# Patient Record
Sex: FEMALE | Race: WHITE | NOT HISPANIC OR LATINO | Employment: PART TIME | URBAN - NONMETROPOLITAN AREA
[De-identification: names, ages, dates, MRNs, and addresses within clinical notes are randomized per-mention and may not be internally consistent; named-entity substitution may affect disease eponyms.]

---

## 2019-02-05 ENCOUNTER — OFFICE VISIT CONVERTED (OUTPATIENT)
Dept: FAMILY MEDICINE CLINIC | Age: 54
End: 2019-02-05
Attending: FAMILY MEDICINE

## 2019-02-07 ENCOUNTER — HOSPITAL ENCOUNTER (OUTPATIENT)
Dept: OTHER | Facility: HOSPITAL | Age: 54
Discharge: HOME | End: 2019-02-07
Attending: FAMILY MEDICINE

## 2019-02-07 LAB
ALBUMIN SERPL-MCNC: 4 G/DL (ref 3.5–5)
ALBUMIN/GLOB SERPL: 1.5 {RATIO} (ref 1.4–2.6)
ALP SERPL-CCNC: 110 U/L (ref 53–141)
ALT SERPL-CCNC: 24 U/L (ref 10–40)
ANION GAP SERPL CALC-SCNC: 14 MMOL/L (ref 8–19)
AST SERPL-CCNC: 32 U/L (ref 15–50)
BILIRUB SERPL-MCNC: 0.32 MG/DL (ref 0.2–1.3)
BUN SERPL-MCNC: 13 MG/DL (ref 5–25)
BUN/CREAT SERPL: 17 {RATIO} (ref 6–20)
CALCIUM SERPL-MCNC: 9.4 MG/DL (ref 8.7–10.4)
CHLORIDE SERPL-SCNC: 107 MMOL/L (ref 99–111)
CHOLEST SERPL-MCNC: 167 MG/DL (ref 107–200)
CHOLEST/HDLC SERPL: 2.5 {RATIO} (ref 3–6)
CONV CO2: 27 MMOL/L (ref 22–32)
CONV TOTAL PROTEIN: 6.7 G/DL (ref 6.3–8.2)
CREAT UR-MCNC: 0.77 MG/DL (ref 0.5–0.9)
ERYTHROCYTE [DISTWIDTH] IN BLOOD BY AUTOMATED COUNT: 12.9 % (ref 11.5–14.5)
FOLATE SERPL-MCNC: >20 NG/ML (ref 4.8–20)
GFR SERPLBLD BASED ON 1.73 SQ M-ARVRAT: >60 ML/MIN/{1.73_M2}
GLOBULIN UR ELPH-MCNC: 2.7 G/DL (ref 2–3.5)
GLUCOSE SERPL-MCNC: 86 MG/DL (ref 65–99)
HBA1C MFR BLD: 14.1 G/DL (ref 12–16)
HCT VFR BLD AUTO: 42.2 % (ref 37–47)
HDLC SERPL-MCNC: 66 MG/DL (ref 40–60)
LDLC SERPL CALC-MCNC: 83 MG/DL (ref 70–100)
MCH RBC QN AUTO: 31.8 PG (ref 27–31)
MCHC RBC AUTO-ENTMCNC: 33.4 G/DL (ref 33–37)
MCV RBC AUTO: 95 FL (ref 81–99)
OSMOLALITY SERPL CALC.SUM OF ELEC: 297 MOSM/KG (ref 273–304)
PLATELET # BLD AUTO: 287 10*3/UL (ref 130–400)
PMV BLD AUTO: 10.4 FL (ref 7.4–10.4)
POTASSIUM SERPL-SCNC: 4.2 MMOL/L (ref 3.5–5.3)
RBC # BLD AUTO: 4.44 10*6/UL (ref 4.2–5.4)
SODIUM SERPL-SCNC: 144 MMOL/L (ref 135–147)
TRIGL SERPL-MCNC: 89 MG/DL (ref 40–150)
TSH SERPL-ACNC: 1.64 M[IU]/L (ref 0.27–4.2)
VIT B12 SERPL-MCNC: 503 PG/ML (ref 211–911)
VLDLC SERPL-MCNC: 18 MG/DL (ref 5–37)
WBC # BLD AUTO: 6.33 10*3/UL (ref 4.8–10.8)

## 2019-02-08 LAB
25(OH)D3 SERPL-MCNC: 34 NG/ML (ref 30–100)
IRON SATN MFR SERPL: 23 % (ref 20–55)
IRON SERPL-MCNC: 98 UG/DL (ref 60–170)
PTH-INTACT SERPL-MCNC: 60.5 PG/ML (ref 11.1–79.5)
TIBC SERPL-MCNC: 422 UG/DL (ref 245–450)
TRANSFERRIN SERPL-MCNC: 295 MG/DL (ref 250–380)

## 2019-02-12 LAB
COPPER SERPL-MCNC: 131 UG/DL (ref 72–166)
ZINC SERPL-MCNC: 76 UG/DL (ref 56–134)

## 2019-02-13 ENCOUNTER — OFFICE VISIT CONVERTED (OUTPATIENT)
Dept: FAMILY MEDICINE CLINIC | Age: 54
End: 2019-02-13
Attending: FAMILY MEDICINE

## 2019-02-13 LAB — CONV VITAMIN B-1 (THIAMINE): 163.4 NMOL/L (ref 66.5–200)

## 2019-03-12 ENCOUNTER — CONVERSION ENCOUNTER (OUTPATIENT)
Dept: CARDIOLOGY | Facility: CLINIC | Age: 54
End: 2019-03-12
Attending: INTERNAL MEDICINE

## 2019-03-12 ENCOUNTER — CONVERSION ENCOUNTER (OUTPATIENT)
Dept: CARDIOLOGY | Facility: CLINIC | Age: 54
End: 2019-03-12

## 2019-06-07 ENCOUNTER — HOSPITAL ENCOUNTER (OUTPATIENT)
Dept: OTHER | Facility: HOSPITAL | Age: 54
Discharge: HOME | End: 2019-06-07
Attending: FAMILY MEDICINE

## 2019-06-07 ENCOUNTER — OFFICE VISIT CONVERTED (OUTPATIENT)
Dept: FAMILY MEDICINE CLINIC | Age: 54
End: 2019-06-07
Attending: FAMILY MEDICINE

## 2019-06-07 LAB
APPEARANCE UR: ABNORMAL
BACTERIA UR CULT: NORMAL
BACTERIA UR QL AUTO: ABNORMAL
CASTS URNS QL MICRO: ABNORMAL /[LPF]
COLOR UR: ABNORMAL
EPI CELLS #/AREA URNS HPF: ABNORMAL /[HPF]
MUCOUS THREADS URNS QL MICRO: ABNORMAL
RBC # BLD AUTO: ABNORMAL /[HPF]
SP GR UR STRIP: 1.02 (ref 1–1.03)
SPECIMEN SOURCE: ABNORMAL
UNIDENT CRYS URNS QL MICRO: ABNORMAL /[HPF]
WBC #/AREA URNS HPF: ABNORMAL /[HPF]

## 2019-06-10 LAB
AMOXICILLIN+CLAV SUSC ISLT: 4
AMPICILLIN SUSC ISLT: >=32
AMPICILLIN+SULBAC SUSC ISLT: 16
BACTERIA UR CULT: ABNORMAL
CEFAZOLIN SUSC ISLT: <=4
CEFEPIME SUSC ISLT: <=1
CEFTAZIDIME SUSC ISLT: <=1
CEFTRIAXONE SUSC ISLT: <=1
CEFUROXIME ORAL SUSC ISLT: 4
CEFUROXIME PARENTER SUSC ISLT: 4
CIPROFLOXACIN SUSC ISLT: <=0.25
ERTAPENEM SUSC ISLT: <=0.5
GENTAMICIN SUSC ISLT: <=1
LEVOFLOXACIN SUSC ISLT: <=0.12
NITROFURANTOIN SUSC ISLT: <=16
TETRACYCLINE SUSC ISLT: <=1
TMP SMX SUSC ISLT: <=20
TOBRAMYCIN SUSC ISLT: <=1

## 2019-09-10 ENCOUNTER — OFFICE VISIT CONVERTED (OUTPATIENT)
Dept: FAMILY MEDICINE CLINIC | Age: 54
End: 2019-09-10
Attending: FAMILY MEDICINE

## 2019-09-10 ENCOUNTER — HOSPITAL ENCOUNTER (OUTPATIENT)
Dept: OTHER | Facility: HOSPITAL | Age: 54
Discharge: HOME | End: 2019-09-10
Attending: FAMILY MEDICINE

## 2019-09-10 LAB
ALBUMIN SERPL-MCNC: 4.2 G/DL (ref 3.5–5)
ALBUMIN/GLOB SERPL: 1.6 {RATIO} (ref 1.4–2.6)
ALP SERPL-CCNC: 118 U/L (ref 53–141)
ALT SERPL-CCNC: 22 U/L (ref 10–40)
ANION GAP SERPL CALC-SCNC: 16 MMOL/L (ref 8–19)
AST SERPL-CCNC: 29 U/L (ref 15–50)
BILIRUB SERPL-MCNC: 0.29 MG/DL (ref 0.2–1.3)
BUN SERPL-MCNC: 13 MG/DL (ref 5–25)
BUN/CREAT SERPL: 17 {RATIO} (ref 6–20)
CALCIUM SERPL-MCNC: 9.7 MG/DL (ref 8.7–10.4)
CHLORIDE SERPL-SCNC: 102 MMOL/L (ref 99–111)
CHOLEST SERPL-MCNC: 156 MG/DL (ref 107–200)
CHOLEST/HDLC SERPL: 2.3 {RATIO} (ref 3–6)
CONV CO2: 29 MMOL/L (ref 22–32)
CONV TOTAL PROTEIN: 6.9 G/DL (ref 6.3–8.2)
CREAT UR-MCNC: 0.76 MG/DL (ref 0.5–0.9)
GFR SERPLBLD BASED ON 1.73 SQ M-ARVRAT: >60 ML/MIN/{1.73_M2}
GLOBULIN UR ELPH-MCNC: 2.7 G/DL (ref 2–3.5)
GLUCOSE SERPL-MCNC: 101 MG/DL (ref 65–99)
HDLC SERPL-MCNC: 68 MG/DL (ref 40–60)
LDLC SERPL CALC-MCNC: 72 MG/DL (ref 70–100)
OSMOLALITY SERPL CALC.SUM OF ELEC: 296 MOSM/KG (ref 273–304)
POTASSIUM SERPL-SCNC: 3.8 MMOL/L (ref 3.5–5.3)
SODIUM SERPL-SCNC: 143 MMOL/L (ref 135–147)
T4 FREE SERPL-MCNC: 1.6 NG/DL (ref 0.9–1.8)
TRIGL SERPL-MCNC: 81 MG/DL (ref 40–150)
TSH SERPL-ACNC: 5.49 M[IU]/L (ref 0.27–4.2)
VLDLC SERPL-MCNC: 16 MG/DL (ref 5–37)

## 2019-09-18 ENCOUNTER — CONVERSION ENCOUNTER (OUTPATIENT)
Dept: CARDIOLOGY | Facility: CLINIC | Age: 54
End: 2019-09-18
Attending: INTERNAL MEDICINE

## 2019-09-25 ENCOUNTER — OFFICE VISIT CONVERTED (OUTPATIENT)
Dept: CARDIOLOGY | Facility: CLINIC | Age: 54
End: 2019-09-25
Attending: INTERNAL MEDICINE

## 2019-09-25 ENCOUNTER — CONVERSION ENCOUNTER (OUTPATIENT)
Dept: OTHER | Facility: HOSPITAL | Age: 54
End: 2019-09-25

## 2019-09-26 ENCOUNTER — HOSPITAL ENCOUNTER (OUTPATIENT)
Dept: OTHER | Facility: HOSPITAL | Age: 54
Discharge: HOME | End: 2019-09-26
Attending: NURSE PRACTITIONER

## 2019-09-26 ENCOUNTER — OFFICE VISIT CONVERTED (OUTPATIENT)
Dept: FAMILY MEDICINE CLINIC | Age: 54
End: 2019-09-26
Attending: NURSE PRACTITIONER

## 2019-10-22 ENCOUNTER — OFFICE VISIT CONVERTED (OUTPATIENT)
Dept: SURGERY | Facility: CLINIC | Age: 54
End: 2019-10-22
Attending: SURGERY

## 2019-12-09 ENCOUNTER — HOSPITAL ENCOUNTER (OUTPATIENT)
Dept: GASTROENTEROLOGY | Facility: HOSPITAL | Age: 54
Setting detail: HOSPITAL OUTPATIENT SURGERY
Discharge: HOME | End: 2019-12-09
Attending: SURGERY

## 2019-12-20 ENCOUNTER — HOSPITAL ENCOUNTER (OUTPATIENT)
Dept: OTHER | Facility: HOSPITAL | Age: 54
Discharge: HOME | End: 2019-12-20
Attending: FAMILY MEDICINE

## 2019-12-20 LAB — TSH SERPL-ACNC: 2.78 M[IU]/L (ref 0.27–4.2)

## 2020-03-16 ENCOUNTER — OFFICE VISIT CONVERTED (OUTPATIENT)
Dept: FAMILY MEDICINE CLINIC | Age: 55
End: 2020-03-16
Attending: NURSE PRACTITIONER

## 2020-03-20 ENCOUNTER — OFFICE VISIT CONVERTED (OUTPATIENT)
Dept: FAMILY MEDICINE CLINIC | Age: 55
End: 2020-03-20
Attending: FAMILY MEDICINE

## 2020-03-20 ENCOUNTER — HOSPITAL ENCOUNTER (OUTPATIENT)
Dept: OTHER | Facility: HOSPITAL | Age: 55
Discharge: HOME | End: 2020-03-20
Attending: FAMILY MEDICINE

## 2020-03-20 LAB
ALBUMIN SERPL-MCNC: 4.3 G/DL (ref 3.5–5)
ALBUMIN/GLOB SERPL: 1.5 {RATIO} (ref 1.4–2.6)
ALP SERPL-CCNC: 101 U/L (ref 53–141)
ALT SERPL-CCNC: 49 U/L (ref 10–40)
ANION GAP SERPL CALC-SCNC: 16 MMOL/L (ref 8–19)
AST SERPL-CCNC: 56 U/L (ref 15–50)
BILIRUB SERPL-MCNC: 0.4 MG/DL (ref 0.2–1.3)
BUN SERPL-MCNC: 15 MG/DL (ref 5–25)
BUN/CREAT SERPL: 17 {RATIO} (ref 6–20)
CALCIUM SERPL-MCNC: 9.5 MG/DL (ref 8.7–10.4)
CHLORIDE SERPL-SCNC: 102 MMOL/L (ref 99–111)
CHOLEST SERPL-MCNC: 190 MG/DL (ref 107–200)
CHOLEST/HDLC SERPL: 2.5 {RATIO} (ref 3–6)
CONV CO2: 27 MMOL/L (ref 22–32)
CONV TOTAL PROTEIN: 7.1 G/DL (ref 6.3–8.2)
CREAT UR-MCNC: 0.89 MG/DL (ref 0.5–0.9)
GFR SERPLBLD BASED ON 1.73 SQ M-ARVRAT: >60 ML/MIN/{1.73_M2}
GLOBULIN UR ELPH-MCNC: 2.8 G/DL (ref 2–3.5)
GLUCOSE SERPL-MCNC: 100 MG/DL (ref 65–99)
HDLC SERPL-MCNC: 75 MG/DL (ref 40–60)
LDLC SERPL CALC-MCNC: 90 MG/DL (ref 70–100)
OSMOLALITY SERPL CALC.SUM OF ELEC: 293 MOSM/KG (ref 273–304)
POTASSIUM SERPL-SCNC: 4 MMOL/L (ref 3.5–5.3)
SODIUM SERPL-SCNC: 141 MMOL/L (ref 135–147)
TRIGL SERPL-MCNC: 123 MG/DL (ref 40–150)
TSH SERPL-ACNC: 1.98 M[IU]/L (ref 0.27–4.2)
VLDLC SERPL-MCNC: 25 MG/DL (ref 5–37)

## 2020-03-27 ENCOUNTER — HOSPITAL ENCOUNTER (OUTPATIENT)
Dept: OTHER | Facility: HOSPITAL | Age: 55
Discharge: HOME | End: 2020-03-27
Attending: INTERNAL MEDICINE

## 2020-04-13 ENCOUNTER — TELEMEDICINE CONVERTED (OUTPATIENT)
Dept: CARDIOLOGY | Facility: CLINIC | Age: 55
End: 2020-04-13
Attending: INTERNAL MEDICINE

## 2020-08-18 ENCOUNTER — OFFICE VISIT CONVERTED (OUTPATIENT)
Dept: CARDIOLOGY | Facility: CLINIC | Age: 55
End: 2020-08-18
Attending: INTERNAL MEDICINE

## 2020-08-24 ENCOUNTER — HOSPITAL ENCOUNTER (OUTPATIENT)
Dept: NUCLEAR MEDICINE | Facility: HOSPITAL | Age: 55
Discharge: HOME | End: 2020-08-24
Attending: INTERNAL MEDICINE

## 2020-09-23 ENCOUNTER — OFFICE VISIT CONVERTED (OUTPATIENT)
Dept: CARDIOLOGY | Facility: CLINIC | Age: 55
End: 2020-09-23
Attending: INTERNAL MEDICINE

## 2020-10-30 ENCOUNTER — HOSPITAL ENCOUNTER (OUTPATIENT)
Dept: OTHER | Facility: HOSPITAL | Age: 55
Discharge: HOME | End: 2020-10-30
Attending: FAMILY MEDICINE

## 2020-11-01 LAB — SARS-COV-2 RNA SPEC QL NAA+PROBE: NOT DETECTED

## 2020-12-04 ENCOUNTER — HOSPITAL ENCOUNTER (OUTPATIENT)
Dept: OTHER | Facility: HOSPITAL | Age: 55
Discharge: HOME | End: 2020-12-04
Attending: NURSE PRACTITIONER

## 2020-12-04 ENCOUNTER — OFFICE VISIT CONVERTED (OUTPATIENT)
Dept: FAMILY MEDICINE CLINIC | Age: 55
End: 2020-12-04
Attending: NURSE PRACTITIONER

## 2020-12-06 LAB
AMPICILLIN SUSC ISLT: >=32
AMPICILLIN+SULBAC SUSC ISLT: >=32
BACTERIA UR CULT: ABNORMAL
CEFAZOLIN SUSC ISLT: <=4
CEFEPIME SUSC ISLT: <=0.12
CEFTAZIDIME SUSC ISLT: <=1
CEFTRIAXONE SUSC ISLT: <=0.25
CIPROFLOXACIN SUSC ISLT: <=0.25
ERTAPENEM SUSC ISLT: <=0.12
GENTAMICIN SUSC ISLT: <=1
LEVOFLOXACIN SUSC ISLT: <=0.12
NITROFURANTOIN SUSC ISLT: <=16
PIP+TAZO SUSC ISLT: <=4
TMP SMX SUSC ISLT: <=20
TOBRAMYCIN SUSC ISLT: <=1

## 2020-12-08 ENCOUNTER — HOSPITAL ENCOUNTER (OUTPATIENT)
Dept: OTHER | Facility: HOSPITAL | Age: 55
Discharge: HOME | End: 2020-12-08
Attending: NURSE PRACTITIONER

## 2021-01-08 ENCOUNTER — HOSPITAL ENCOUNTER (OUTPATIENT)
Dept: OTHER | Facility: HOSPITAL | Age: 56
Discharge: HOME | End: 2021-01-08
Attending: NURSE PRACTITIONER

## 2021-01-08 ENCOUNTER — OFFICE VISIT CONVERTED (OUTPATIENT)
Dept: FAMILY MEDICINE CLINIC | Age: 56
End: 2021-01-08
Attending: NURSE PRACTITIONER

## 2021-01-08 LAB
ALBUMIN SERPL-MCNC: 4.3 G/DL (ref 3.5–5)
ALBUMIN/GLOB SERPL: 1.7 {RATIO} (ref 1.4–2.6)
ALP SERPL-CCNC: 127 U/L (ref 53–141)
ALT SERPL-CCNC: 28 U/L (ref 10–40)
ANION GAP SERPL CALC-SCNC: 15 MMOL/L (ref 8–19)
AST SERPL-CCNC: 35 U/L (ref 15–50)
BASOPHILS # BLD MANUAL: 0.07 10*3/UL (ref 0–0.2)
BASOPHILS NFR BLD MANUAL: 1.2 % (ref 0–3)
BILIRUB SERPL-MCNC: 0.37 MG/DL (ref 0.2–1.3)
BUN SERPL-MCNC: 15 MG/DL (ref 5–25)
BUN/CREAT SERPL: 17 {RATIO} (ref 6–20)
CALCIUM SERPL-MCNC: 9.7 MG/DL (ref 8.7–10.4)
CHLORIDE SERPL-SCNC: 104 MMOL/L (ref 99–111)
CHOLEST SERPL-MCNC: 184 MG/DL (ref 107–200)
CHOLEST/HDLC SERPL: 2.7 {RATIO} (ref 3–6)
CONV CO2: 28 MMOL/L (ref 22–32)
CONV TOTAL PROTEIN: 6.9 G/DL (ref 6.3–8.2)
CREAT UR-MCNC: 0.87 MG/DL (ref 0.5–0.9)
DEPRECATED RDW RBC AUTO: 44.8 FL
EOSINOPHIL # BLD MANUAL: 0.17 10*3/UL (ref 0–0.7)
EOSINOPHIL NFR BLD MANUAL: 2.9 % (ref 0–7)
ERYTHROCYTE [DISTWIDTH] IN BLOOD BY AUTOMATED COUNT: 12.6 % (ref 11.5–14.5)
FERRITIN SERPL-MCNC: 318 NG/ML (ref 10–200)
GFR SERPLBLD BASED ON 1.73 SQ M-ARVRAT: >60 ML/MIN/{1.73_M2}
GLOBULIN UR ELPH-MCNC: 2.6 G/DL (ref 2–3.5)
GLUCOSE SERPL-MCNC: 91 MG/DL (ref 65–99)
GRANS (ABSOLUTE): 3.81 10*3/UL (ref 2–8)
GRANS: 64.2 % (ref 30–85)
HBA1C MFR BLD: 14.9 G/DL (ref 12–16)
HCT VFR BLD AUTO: 44.3 % (ref 37–47)
HDLC SERPL-MCNC: 67 MG/DL (ref 40–60)
IMM GRANULOCYTES # BLD: 0 10*3/UL (ref 0–0.54)
IMM GRANULOCYTES NFR BLD: 0 % (ref 0–0.43)
IRON SATN MFR SERPL: 26 % (ref 20–55)
IRON SERPL-MCNC: 103 UG/DL (ref 60–170)
LDLC SERPL CALC-MCNC: 97 MG/DL (ref 70–100)
LYMPHOCYTES # BLD MANUAL: 1.48 10*3/UL (ref 1–5)
LYMPHOCYTES NFR BLD MANUAL: 6.7 % (ref 3–10)
MCH RBC QN AUTO: 32 PG (ref 27–31)
MCHC RBC AUTO-ENTMCNC: 33.6 G/DL (ref 33–37)
MCV RBC AUTO: 95.3 FL (ref 81–99)
MONOCYTES # BLD AUTO: 0.4 10*3/UL (ref 0.2–1.2)
OSMOLALITY SERPL CALC.SUM OF ELEC: 296 MOSM/KG (ref 273–304)
PLATELET # BLD AUTO: 258 10*3/UL (ref 130–400)
PMV BLD AUTO: 10.3 FL (ref 7.4–10.4)
POTASSIUM SERPL-SCNC: 4.2 MMOL/L (ref 3.5–5.3)
RBC # BLD AUTO: 4.65 10*6/UL (ref 4.2–5.4)
RBC # BLD AUTO: 4.68 10*6/UL (ref 4.2–5.4)
RETICS # AUTO: 0.07 10*6/UL (ref 0.02–0.08)
RETICS/RBC NFR AUTO: 1.5 % (ref 0.5–1.7)
SODIUM SERPL-SCNC: 143 MMOL/L (ref 135–147)
TIBC SERPL-MCNC: 402 UG/DL (ref 245–450)
TRANSFERRIN SERPL-MCNC: 281 MG/DL (ref 250–380)
TRIGL SERPL-MCNC: 101 MG/DL (ref 40–150)
TSH SERPL-ACNC: 1.38 M[IU]/L (ref 0.27–4.2)
VARIANT LYMPHS NFR BLD MANUAL: 25 % (ref 20–45)
VLDLC SERPL-MCNC: 20 MG/DL (ref 5–37)
WBC # BLD AUTO: 5.72 10*3/UL (ref 4.8–10.8)
WBC # BLD AUTO: 5.93 10*3/UL (ref 4.8–10.8)

## 2021-02-26 ENCOUNTER — OFFICE VISIT CONVERTED (OUTPATIENT)
Dept: FAMILY MEDICINE CLINIC | Age: 56
End: 2021-02-26
Attending: NURSE PRACTITIONER

## 2021-02-26 ENCOUNTER — HOSPITAL ENCOUNTER (OUTPATIENT)
Dept: OTHER | Facility: HOSPITAL | Age: 56
Discharge: HOME | End: 2021-02-26
Attending: NURSE PRACTITIONER

## 2021-03-18 ENCOUNTER — HOSPITAL ENCOUNTER (OUTPATIENT)
Dept: OTHER | Facility: HOSPITAL | Age: 56
Discharge: HOME | End: 2021-03-18
Attending: INTERNAL MEDICINE

## 2021-03-18 LAB
ALBUMIN SERPL-MCNC: 4.2 G/DL (ref 3.5–5)
ALBUMIN/GLOB SERPL: 1.4 {RATIO} (ref 1.4–2.6)
ALP SERPL-CCNC: 135 U/L (ref 53–141)
ALT SERPL-CCNC: 24 U/L (ref 10–40)
ANION GAP SERPL CALC-SCNC: 13 MMOL/L (ref 8–19)
AST SERPL-CCNC: 34 U/L (ref 15–50)
BILIRUB SERPL-MCNC: 0.31 MG/DL (ref 0.2–1.3)
BUN SERPL-MCNC: 15 MG/DL (ref 5–25)
BUN/CREAT SERPL: 17 {RATIO} (ref 6–20)
CALCIUM SERPL-MCNC: 10 MG/DL (ref 8.7–10.4)
CHLORIDE SERPL-SCNC: 103 MMOL/L (ref 99–111)
CHOLEST SERPL-MCNC: 161 MG/DL (ref 107–200)
CHOLEST/HDLC SERPL: 2.5 {RATIO} (ref 3–6)
CONV CO2: 32 MMOL/L (ref 22–32)
CONV TOTAL PROTEIN: 7.1 G/DL (ref 6.3–8.2)
CREAT UR-MCNC: 0.87 MG/DL (ref 0.5–0.9)
GFR SERPLBLD BASED ON 1.73 SQ M-ARVRAT: >60 ML/MIN/{1.73_M2}
GLOBULIN UR ELPH-MCNC: 2.9 G/DL (ref 2–3.5)
GLUCOSE SERPL-MCNC: 106 MG/DL (ref 65–99)
HDLC SERPL-MCNC: 64 MG/DL (ref 40–60)
LDLC SERPL CALC-MCNC: 77 MG/DL (ref 70–100)
OSMOLALITY SERPL CALC.SUM OF ELEC: 297 MOSM/KG (ref 273–304)
POTASSIUM SERPL-SCNC: 4.9 MMOL/L (ref 3.5–5.3)
SODIUM SERPL-SCNC: 143 MMOL/L (ref 135–147)
TRIGL SERPL-MCNC: 102 MG/DL (ref 40–150)
VLDLC SERPL-MCNC: 20 MG/DL (ref 5–37)

## 2021-03-24 ENCOUNTER — CONVERSION ENCOUNTER (OUTPATIENT)
Dept: OTHER | Facility: HOSPITAL | Age: 56
End: 2021-03-24

## 2021-03-24 ENCOUNTER — OFFICE VISIT CONVERTED (OUTPATIENT)
Dept: CARDIOLOGY | Facility: CLINIC | Age: 56
End: 2021-03-24
Attending: INTERNAL MEDICINE

## 2021-04-16 ENCOUNTER — HOSPITAL ENCOUNTER (OUTPATIENT)
Dept: OTHER | Facility: HOSPITAL | Age: 56
Discharge: HOME | End: 2021-04-16
Attending: FAMILY MEDICINE

## 2021-04-16 ENCOUNTER — OFFICE VISIT CONVERTED (OUTPATIENT)
Dept: FAMILY MEDICINE CLINIC | Age: 56
End: 2021-04-16
Attending: FAMILY MEDICINE

## 2021-04-16 LAB
APPEARANCE UR: CLEAR
BACTERIA UR QL AUTO: ABNORMAL
BILIRUB UR QL: NEGATIVE
CASTS URNS QL MICRO: ABNORMAL /[LPF]
COLOR UR: YELLOW
CONV LEUKOCYTE ESTERASE: NEGATIVE
CONV UROBILINOGEN IN URINE BY AUTOMATED TEST STRIP: 0.2 {EHRLICHU}/DL (ref 0.1–1)
EPI CELLS #/AREA URNS HPF: ABNORMAL /[HPF]
GLUCOSE 24H UR-MCNC: NEGATIVE MG/DL
HGB UR QL STRIP: ABNORMAL
KETONES UR QL STRIP: NEGATIVE MG/DL
MUCOUS THREADS URNS QL MICRO: ABNORMAL
NITRITE UR-MCNC: NEGATIVE MG/ML
PH UR STRIP.AUTO: 5.5 [PH] (ref 5–8)
PROT UR-MCNC: NEGATIVE MG/DL
RBC # BLD AUTO: ABNORMAL /[HPF]
SP GR UR STRIP: 1.02 (ref 1–1.03)
SPECIMEN SOURCE: ABNORMAL
UNIDENT CRYS URNS QL MICRO: ABNORMAL /[HPF]
WBC #/AREA URNS HPF: ABNORMAL /[HPF]

## 2021-04-21 LAB
CONV LAST MENSTURAL PERIOD: 2017
HPV HYBRID CAPTURE HIGH RISK: NEGATIVE
SPECIMEN SOURCE: NORMAL
SPECIMEN SOURCE: NORMAL
THIN PREP CVX: NORMAL

## 2021-04-30 ENCOUNTER — HOSPITAL ENCOUNTER (OUTPATIENT)
Dept: OTHER | Facility: HOSPITAL | Age: 56
Discharge: HOME | End: 2021-04-30
Attending: FAMILY MEDICINE

## 2021-05-10 ENCOUNTER — OFFICE VISIT CONVERTED (OUTPATIENT)
Dept: PODIATRY | Facility: CLINIC | Age: 56
End: 2021-05-10
Attending: PODIATRIST

## 2021-05-12 NOTE — PROGRESS NOTES
Quick Note      Patient Name: Lucina Payan   Patient ID: [id number]   Sex: Female   YOB: 1965    Primary Care Provider: Brooke Clark MD   Referring Provider: Brooke Clark MD    Visit Date: April 13, 2020    Provider: Yayo Light MD   Location: [facility]   Location Address: [address] [place] [address], [facility]   JOCELINE Aguayo  [id number]   Location Phone: [phone]          History Of Present Illness  Video Conferencing Visit  Lucina Payan is a 54-year-old female who is presenting for evaluation via video conferencing. Verbal consent obtained before beginning visit. The patient has a history of bradycardia, status post permanent pacemaker placement, hypertension, hyperlipidemia, and ascending aortic aneurysm. The patient was last seen the cardiology office on September 25, 2019, and the dose of Metoprolol was increased because of palpitations. Today, the patient reports feeling better. She still gets palpitations but less intense with the medication change. She denies chest pain or dizziness. She occasional gets shortness of breath on activities. She has mild ankle edema which has been stable. She is not very active at this time, and in fact patient is not going out of the house frequently.   The following staff were present during this visit: Provider only.      CHIEF COMPLAINT:  Follow up for aortic aneurysm, permanent pacemaker placement.     PAST MEDICAL HISTORY:  1) Status post permanent pacemaker placement for bradycardia in 2013; 2) Ascending aortic aneurysm; 3) Hypertension; 4) Hyperlipidemia.    PSYCHOSOCIAL HISTORY:  The patient rarely drinks alcohol.  She previously smoked but quit.     CURRENT MEDICATIONS:  Klor-Con M20 wax daily; irbesartan 150 mg daily; metoprolol succinate ER 50 mg in the morning and 25 mg in the evening; pravastatin 20 mg daily; torsemide 20 mg daily; levothyroxine 112 mcg daily; Zoloft 150 mg daily; Wellbutrin  mg daily; trazodone  100 mg daily; multivitamin daily; poly-iron 150 forte daily. The dosage and frequency of the medications were reviewed with the patient.     REVIEW OF SYSTEMS:  Positive for palpitations on and off, positive for swelling of the left ankle and left hand.    Negative for chest pain, shortness of breath, chronic or frequent cough, or asthma/wheezing.       Physical Examination       VITAL SIGNS:  Per patient, weight 195 pounds. Unable to provide blood pressure reading.    Home remote interrogation done on 04/09/2020 showed battery longevity 3 years.  Atrial lead 85% pacing with an impedance of 382.  RV lead with 0% pacing with impedance of 412.  There were no true episodes.      CT scan of the chest without contrast done on 03/27/2020 showed ascending aortic aneurysm measuring 4.6 cm at the level of the pulmonary artery, also noted to have coronary artery calcifications.     Labs done on 03/30/2020 showed BUN of 15, creatinine of 0.89, calcium 9.5, sodium 141, potassium 4.0, chloride 102, total protein 7.1, albumin 4.3, AST 5, ALT 49, LDL 90.           Assessment     ASSESSMENT AND PLAN:  1.  Ascending aortic aneurysm.  Currently measures 4.6 cm, up from 4.2 earlier.  Patient has no symptoms.         Will make a referral to Dr. Yoni Velasco, cardiothoracic surgeon, for further evaluation.  Will continue        irbesartan with adequately controlled blood pressure.  2.  Status post permanent pacemaker placement.  Normally functioning pacemaker.  Will do routine off clinic        interrogation during next visit.   3.  Coronary artery calcification.  Incidental finding on CT scan of the chest.  Patient has no chest pain.  She        has no history of coronary artery disease.  The condition was explained in detail to patient.  She is already        on statin which will be continued.  She will need a nuclear stress test to rule out ischemia.  However,        because of the current pandemic condition, we will wait to schedule  as of now.  She is advised to contact        us if there is any worsening chest pain or any new symptoms.   4.  Follow up in four months.       MD ROBERTA Zuluaga/mariya    This note was transcribed by Latanya Gonzales.  pap/[email or web address]  The above service was transcribed by Latanya Gonzales, and I attest to the accuracy of the note.  JV             Electronically Signed by: Yayo Light MD -Author on April 13, 2020 04:30:54 PM

## 2021-05-13 NOTE — PROGRESS NOTES
Progress Note      Patient Name: Lucina Payan   Patient ID: [id number]   Sex: Female   YOB: 1965    Primary Care Provider: Brooke Clark MD   Referring Provider: Brooke Clark MD    Visit Date: September 23, 2020    Provider: Yayo Light MD   Location: [facility]   Location Address: [address] [place] [address] [facility]  [address]  [place], [place]  [id number]   Location Phone: [phone]          Chief Complaint     Followup visit for chest pain, aortic aneurysm, and status post permanent pacemaker placement.       History Of Present Illness  REFERRING CARE PROVIDER: Brooke Clark MD   Lucina Payan is a 54 year old /White female with ascending aortic aneurysm, hypertension, hyperlipidemia, and status post permanent pacemaker placement who is here for a followup visit. During recent office visit, she reported having chest pain. SPECT stress test done in August 2020 is negative for reversible ischemia. Today, she reports the chest pain episodes have improved significantly. She still gets a mild pain in the left side of the chest after working out in the garden and the episode usually lasts for a minute, but they are less frequent now. No dizziness. She occasionally has palpitations. She was seen by Dr. Velasco recently, who recommended a yearly CT scan.   PAST MEDICAL HISTORY: 1) Status post permanent pacemaker placement for bradycardia in 2013; 2) Ascending aortic aneurysm; 3) Hypertension; 4) Hyperlipidemia.   PSYCHOSOCIAL HISTORY: Current smoker of less than a pack per day. Rarely consumes alcohol. Admits mood changes and depression.   CURRENT MEDICATIONS: Levothyroxine 112 mcg daily; irbesartan 75 mg daily; torsemide 20 mg daily; metoprolol 50 mg and 25 mg daily; potassium 20 mEq daily; famotidine 20 mg daily; trazodone 100 mg daily; bupropion 300 mg daily; sertraline 100 mg daily; calcium 600 mg daily; multivitamin daily.       Review of  "Systems  · Cardiovascular  o Admits  o : palpitations (fast, fluttering, or skipping beats), swelling (feet, ankles, hands)  o Denies  o : shortness of breath while walking or lying flat, chest pain or angina pectoris   · Respiratory  o Denies  o : chronic or frequent cough, asthma or wheezing      Vitals  Date Time BP Position Site L\R Cuff Size HR RR TEMP (F) WT  HT  BMI kg/m2 BSA m2 O2 Sat        09/23/2020 10:40 /87 Sitting    88 - R   190lbs 0oz 5'  7\" 29.76 2.02           Physical Examination  · Respiratory  o Auscultation of Lungs  o : Clear to auscultation bilaterally. No crackles or rhonchi.  · Cardiovascular  o Heart  o : S1, S2 normally heard. No S3. No murmur, rubs, or gallops.  · Gastrointestinal  o Abdominal Examination  o : Soft, nontender, nondistended. No free fluid. Bowel sounds heard in all four quadrants.  · Extremities  o Extremities  o : Warm and well perfused. No pitting pedal edema. Distal pulses present.          Assessment     ASSESSMENT & PLAN:    1.  Status post permanent pacemaker placement for symptomatic bradycardia.  Recent evaluation showed         normally-functioning device.  Will continue home monitoring.  2.  Chest pain/tightness.  Recent SPECT study was negative.  Symptoms are better but not completely gone.         Patient is not interested in cardiac catheterization.  Recommend to continue statins.  3.  Hyperlipidemia.  Continue pravastatin.  4.  Hypertension, well controlled.  Continue current regimen.  Will check a lipid panel before next visit.  5.  Follow up in 6 months.        Yayo Ligth MD  JMEE:[email or web address]             Electronically Signed by: Olga Urias-, Other -Author on September 29, 2020 09:40:47 AM  Electronically Co-signed by: Yayo Light MD -Reviewer on September 29, 2020 01:05:05 PM  "

## 2021-05-13 NOTE — PROGRESS NOTES
Progress Note      Patient Name: Lucina Payan   Patient ID: [id number]   Sex: Female   YOB: 1965    Primary Care Provider: Brooke Clark MD   Referring Provider: Brooke Clark MD    Visit Date: August 18, 2020    Provider: Yayo Light MD   Location: [facility]   Location Address: [address] [place] [address] [facility]  [address]  [place], [place]  [id number]   Location Phone: [phone]          Chief Complaint     Followup visit for aortic aneurysm, status post permanent pacemaker placement.       History Of Present Illness  REFERRING CARE PROVIDER: Brooke Clark MD   Lucina Payan is a 54 year old /White female with ascending aortic aneurysm, hypertension, hyperlipidemia, and status post permanent pacemaker placement for bradycardia who is here for a followup visit. Last evaluation was in April by video telehealth. Today, she reports some chest tightness on the left side of the chest, usually happens after activities and lasts for a few seconds. No significant shortness of breath, but reports occasional palpitations, described as skipped beats. No dizziness. No syncopal episodes. She has not got an appointment with Dr. Velasco yet.   PAST MEDICAL HISTORY: 1) Status post permanent pacemaker placement for bradycardia in 2013; 2) Ascending aortic aneurysm; 3) Hypertension; 4) Hyperlipidemia.   PSYCHOSOCIAL HISTORY: Current smoker of one pack per day. Rarely consumes alcohol. Admits mood changes and depression.   CURRENT MEDICATIONS: Klor-Con M20 20 mEq daily; irbesartan 150 mg daily; metoprolol succinate ER 50 mg q. a.m. and 25 mg q. p.m.; pravastatin 20 mg daily; torsemide 20 mg daily; levothyroxine 112 mcg daily; Zoloft 150 mg daily; Wellbutrin  mg daily; trazodone 100 mg daily; multivitamin daily; poly-iron 150 forte daily.       Review of Systems  · Cardiovascular  o Admits  o : palpitations (fast, fluttering, or skipping beats), swelling (feet, ankles,  "hands)  o Denies  o : shortness of breath while walking or lying flat, chest pain or angina pectoris   · Respiratory  o Denies  o : chronic or frequent cough, asthma or wheezing      Vitals  Date Time BP Position Site L\R Cuff Size HR RR TEMP (F) WT  HT  BMI kg/m2 BSA m2 O2 Sat HC       08/18/2020 10:22 AM 97/68 Sitting    87 - R   185lbs 16oz 5'  7\" 29.13 2     08/18/2020 10:22 AM 96/63 Sitting                     Physical Examination  · Respiratory  o Auscultation of Lungs  o : Clear to auscultation bilaterally. No crackles or rhonchi.  · Cardiovascular  o Heart  o : S1, S2 normally heard. No S3. No murmur, rubs, or gallops.  · Gastrointestinal  o Abdominal Examination  o : Soft, nontender, nondistended. No free fluid. Bowel sounds heard in all four quadrants.  · Extremities  o Extremities  o : Warm and well perfused. No pitting pedal edema. Distal pulses present.          Assessment     ASSESSMENT & PLAN:    1.  Chest tightness.  Symptoms have both typical and atypical features for angina.  Recent CT scan of the chest        showed coronary artery calcification and a stress test was recommended at that time.  Will proceed with a        SPECT stress test to rule out ischemia.  A SPECT is needed since patient has an ascending aortic aneurysm        and is unable to exercise.    2.  Ascending aortic aneurysm.  Size is 4.6 cm.  Will make a referral again to Dr. Velasco for evaluation.  3.  Status post permanent pacemaker placement.  Will do a pacemaker interrogation in our [place]        office on the next available date.  Battery longevity is 3 years per recent home testing.  4.  Dizziness.  Minor dizziness currently.  Blood pressure is low with systolic in the 90s.  Will decrease        irbesartan to 75 mg daily.  Patient is recommended to keep a log of her home blood pressure, and        metoprolol can be reduced as well if symptoms persist.    5.  Will follow with stress testing and pacemaker interrogation " reports.          Yayo Light MD  JV:vm             Electronically Signed by: Olga Urias-, Other -Author on August 19, 2020 09:28:40 AM  Electronically Co-signed by: Yayo Light MD -Reviewer on August 19, 2020 10:33:00 AM

## 2021-05-14 VITALS
HEIGHT: 67 IN | WEIGHT: 182.25 LBS | HEART RATE: 79 BPM | DIASTOLIC BLOOD PRESSURE: 83 MMHG | SYSTOLIC BLOOD PRESSURE: 117 MMHG | BODY MASS INDEX: 28.61 KG/M2

## 2021-05-14 VITALS
WEIGHT: 186 LBS | HEART RATE: 87 BPM | SYSTOLIC BLOOD PRESSURE: 97 MMHG | DIASTOLIC BLOOD PRESSURE: 68 MMHG | BODY MASS INDEX: 29.19 KG/M2 | HEIGHT: 67 IN

## 2021-05-14 VITALS
HEIGHT: 67 IN | HEART RATE: 88 BPM | DIASTOLIC BLOOD PRESSURE: 87 MMHG | WEIGHT: 190 LBS | BODY MASS INDEX: 29.82 KG/M2 | SYSTOLIC BLOOD PRESSURE: 128 MMHG

## 2021-05-14 NOTE — PROGRESS NOTES
Progress Note      Patient Name: Lucina Payan   Patient ID: [id number]   Sex: Female   YOB: 1965    Primary Care Provider: Brooke Clark MD   Referring Provider: Brooke Clark MD    Visit Date: March 24, 2021    Provider: Yayo Light MD   Location: [facility] [place]   Location Address: [address] [place] [address] [facility]  [address]  [place], [place]  [id number]   Location Phone: [phone]          Chief Complaint     Follow-up visit for chest pain, status post permanent pacemaker placement and aortic aneurysm.       History Of Present Illness  REFERRING CARE PROVIDER: Brooke Clark MD   Lucina Payan is a 55 year old /White female with ascending aortic aneurysm, hypertension, hyperlipidemia, status post permanent pacemaker placement who is here for a 6 month follow-up visit. She underwent SPECT stress test in August of last year for chest pain evaluation which was negative for ischemia. Today, she reports feeling fine, no further chest pain episodes over the past several months. She denies any shortness of breath, palpitations, or dizziness. She is taking all the medicines as prescribed.   PAST MEDICAL HISTORY: 1) Status post permanent pacemaker placement for bradycardia in 2013; 2) Ascending aortic aneurysm; 3) Hypertension; 4) Hyperlipidemia.   PSYCHOSOCIAL HISTORY: Rarely uses alcohol. Currently smokes less than a pack a day.   CURRENT MEDICATIONS: Medication list was reviewed and is as documented.      ALLERGIES: Demerol; Morphine Sulfate.       Review of Systems  · Cardiovascular  o Admits  o : swelling (feet, ankles, hands)  o Denies  o : palpitations (fast, fluttering, or skipping beats), shortness of breath while walking or lying flat, chest pain or angina pectoris   · Respiratory  o Denies  o : chronic or frequent cough      Vitals  Date Time BP Position Site L\R Cuff Size HR RR TEMP (F) WT  HT  BMI kg/m2 BSA m2 O2 Sat FR L/min FiO2 HC       03/24/2021 02:28 /83 Sitting  "   79 - R   182lbs 4oz 5'  7\" 28.54 1.98             Physical Examination  · Respiratory  o Auscultation of Lungs  o : Clear to auscultation bilaterally. No crackles or rhonchi.  · Cardiovascular  o Heart  o : S1, S2 is normally heard. No S3. No murmur, rubs, or gallops.  · Gastrointestinal  o Abdominal Examination  o : Soft, nontender, nondistended. No free fluid. Bowel sounds heard in all four quadrants.  · Extremities  o Extremities  o : Warm and well perfused. Trace pitting pedal edema bilaterally. Distal pulses present.  · Labs  o Labs  o : Done 03/18/2021 showed triglycerides of 102, total cholesterol 161, LDL is 77, HDL is 64, sodium is 143, potassium is 4.9, BUN is 15, creatinine is 0.87, hgb is 14.9, hct is 44.3, WBC count is 5.72, platelet count is 258.           Assessment     1.  Chest pain, currently asymptomatic, previous SPECT stress is negative for ischemia. No further testing is needed at this time.  2.  Ascending aortic aneurysm, currently follows up with Dr. Velasco. She is due for repeat CT scan this year.  3.  Hypertension, well controlled, continue current regimen.  4.  Status post permanent pacemaker placement. Normally functioning device per home report interrogation, downloaded on 02/28/2021. Atrial pacing is 87% with no ventricular pacing. Battery longevity is 2.3 years. We will do the pacemaker interrogation in our office during the next visit.     FOLLOW-UP:   9 months with pacemaker interrogation in the office.        MD ROBERTA Zuluaga/josue               Electronically Signed by: Larisa Stallings-, OT -Author on April 5, 2021 09:00:24 AM  Electronically Co-signed by: Yayo Light MD -Reviewer on April 12, 2021 04:40:25 PM  "

## 2021-05-15 VITALS
DIASTOLIC BLOOD PRESSURE: 85 MMHG | WEIGHT: 191 LBS | BODY MASS INDEX: 29.98 KG/M2 | SYSTOLIC BLOOD PRESSURE: 118 MMHG | HEART RATE: 85 BPM | HEIGHT: 67 IN

## 2021-05-15 VITALS
SYSTOLIC BLOOD PRESSURE: 110 MMHG | HEIGHT: 67 IN | DIASTOLIC BLOOD PRESSURE: 82 MMHG | WEIGHT: 216 LBS | HEART RATE: 69 BPM | BODY MASS INDEX: 33.9 KG/M2

## 2021-05-15 VITALS — WEIGHT: 191.5 LBS | RESPIRATION RATE: 16 BRPM | BODY MASS INDEX: 30.06 KG/M2 | HEIGHT: 67 IN

## 2021-05-18 NOTE — PROGRESS NOTES
"Lucina Payan E  1965     Office/Outpatient Visit    Visit Date: Fri, Mar 20, 2020 04:03 pm    Provider: Brooke Clark MD (Assistant: Mickie Chandra MA)    Location: [facility]        Electronically signed by Brooke Clark MD on  03/20/2020 04:56:15 PM                             Subjective:        CC: Ms. Payan is a 54 year old White female.  Patient presents today for follow up visit;         HPI: Lucina is here today for an annual physical, and also to follow up on chronic issues.        She was diagnosed with shingles earlier this week.  Got a course of valacyclovir and prednisone and it does seem to be getting better.  However, she is still having significant stinging pain in the L flank.        She is on metoprolol succinate and losartan for HTN.  She is on torsemide with K+.  She follows with Dr. [facility].  S/p pacemaker placemaker placement for bradycardia.  No CP, palpitations, SOB.  BP has been well controlled.          She is on pravastatin for HLD. No myalgias.        She is on Synthroid for hypothyroidism.  No heat/cold intolerance, no changes in hair or skin.        She is on Zoloft 150 mg and Wellbutrin 300 mg for depression.  She is on trazodone 100 mg for insomnia.  She is sleeping well.  Has seen a therapist at [facility] in the past but is not seeing anyone currently.        She was on omeprazole for GERD, but a couple months ago, her rx ran out and she has not had any since.  She is doing \"okay.\"  Having some increased gas.   She is also on iron supplement.  She has h/o gastric bypass.    ROS:     CONSTITUTIONAL:  Negative for fatigue and fever.      EYES:  Negative for blurred vision.      E/N/T:  Negative for nasal congestion and frequent rhinorrhea.      CARDIOVASCULAR:  Negative for chest pain and palpitations.      RESPIRATORY:  Negative for recent cough and dyspnea.      GASTROINTESTINAL:  Negative for abdominal pain, constipation, diarrhea, nausea and " vomiting.      MUSCULOSKELETAL:  Negative for arthralgias and myalgias.      PSYCHIATRIC:  Negative for anxiety, crying spells, depression, feelings of stress, anhedonia, mood swings, difficulty concentrating, sadness and sleep disturbance.          Past Medical History / Family History / Social History:         Last Reviewed on 3/16/2020 12:56 PM by Felicity Brown    Past Medical History:                 PAST MEDICAL HISTORY             GYNECOLOGICAL HISTORY:             CURRENT MEDICAL PROVIDERS:    Cardiologist: Dr. Denis    Obstetrician/Gynecologist         PREVENTIVE HEALTH MAINTENANCE             Hepatitis C Medicare Screening: was last done 10/2017     MAMMOGRAM: Done within last 2 years and results in are chart was last done 2019 with normal results     PAP SMEAR: was last done 2017 with normal results NIL, benign changes a/w atrophy, and neg HR-HPV (Dr. Wilkinson)         Surgical History:         Appendectomy    Cholecystectomy    Tonsillectomy/Adenoidectomy    Tubal Ligation      section: X 2;     GATRIC BYPASS;;;    PACEMAKER;;;     COLONOSCOPY: 2015-NEG;;         Family History:         Positive for Hypertension.      Positive for Breast Cancer and Colon Cancer.      Positive for Renal Stones.      Positive for Cerebrovascular Accident.      Positive for Type 1 Diabetes, Type 2 Diabetes and Hypothyroidism.          Social History:     Occupation:    Retired     Marital Status:          Tobacco/Alcohol/Supplements:     Last Reviewed on 3/16/2020 12:56 PM by Felicity Brown    Tobacco: She has a past history of cigarette smoking; quit date:  10/2019.          Alcohol: Frequency:    RARE;     Caffeine:  She admits to consuming caffeine via -LITTLE.          Substance Abuse History:     Last Reviewed on 3/16/2020 12:56 PM by Felicity Brown    None         Mental Health History:     Last Reviewed on 3/16/2020 12:56 PM by Felicity Brown        Communicable Diseases (eg  STDs):     Last Reviewed on 9/10/2019 09:04 AM by Brooke Clark        Current Problems:     Last Reviewed on 3/16/2020 12:55 PM by Felicity Brown    Acquired hypothyroidism    Thoracic aortic aneurysm, without rupture    Patient with cardiac pacemaker    Other specified hypothyroidism    Pure hypercholesterolemia    Dysthymic disorder    Insomnia, unspecified    Thoracic aortic aneurysm, without rupture    Presence of cardiac pacemaker    Insomnia    Allergies    High cholesterol    Depression responsive to treatment    HTN    Essential (primary) hypertension    Bradycardia    Osteoarthritis of hand(s)    Bariatric surgery status    GERD    Bariatric surgery status    Gastro-esophageal reflux disease without esophagitis    Primary osteoarthritis, right hand    Primary osteoarthritis, left hand    Encounter for screening for malignant neoplasm of colon    Screening for cancer of colon    Zoster with other complications    Encounter for screening for depression        Immunizations:     Fluzone (3 + years dose) 10/1/2016    Fluzone (3 + years dose) 10/17/2017    Fluzone Quadrivalent (3+ years) 2/5/2019        Allergies:     Last Reviewed on 3/16/2020 12:56 PM by Felicity Brown    Demerol HCl:      Morphine Sulfate:      elsie silver:          Current Medications:     Last Reviewed on 3/16/2020 12:56 PM by Felicity Brown    Metoprolol Succinate 50 mg oral Tablet, Extended Release 24 hr [Take 1.5 tablet(s) by mouth daily]    Pravastatin 20 mg oral tablet [1 tab q day hs]    Klor-Con M20 20 mEq oral Tablet, Extended Release Particles/Crystals [Take 1 tablet(s) by mouth bid]    osteo bi flex      Torsemide 20 mg oral tablet [Take 1 tablet(s) by mouth daily  ]    Diphenhydramine HCl 50mg Tablet [Take 1 tablet(s) by mouth at bedtime prn for sleep]    Multivitamin/Mineral Supplement  Tablet [1 tablet daily.]    calcium 1200 plus D3 1000IU      levothyroxine 112 mcg oral tablet [TAKE ONE TABLET BY MOUTH EVERY  MORNING]    sertraline 100 mg oral tablet [TAKE ONE AND ONE-HALF TABLET BY MOUTH DAILY]    traZODone 100 mg oral tablet [TAKE ONE TABLET BY MOUTH AT BEDTIME]    ferrous sulfate 325 mg (65 mg iron) oral tablet [TAKE ONE TABLET BY MOUTH DAILY]    Ibuprofen 600 mg oral tablet [1 tab tid PRN]    Irbesartan 150 mg oral tablet [Take 1 tablet(s) by mouth daily]    valACYclovir 1 gram oral tablet [take 1 tablet (1,000 mg) by oral route 3 times per day x 7 days]    predniSONE 20 mg oral tablet [take 1 tablet by mouth daily x 5 days]        Objective:        Vitals:         Current: 3/20/2020 4:06:49 PM    Ht:  5 ft, 6.5 in;  Wt: 196.4 lbs;  BMI: 31.2T: 98.8 F (oral);  BP: 117/87 mm Hg (right arm, sitting);  P: 89 bpm (right arm (BP Cuff), sitting);  sCr: 0.76 mg/dL;  GFR: 94.53        Exams:     PHYSICAL EXAM:     GENERAL: vital signs recorded - well developed, well nourished;  well groomed;  no apparent distress;     EYES: extraocular movements intact; conjunctiva and cornea are normal; PERRLA;     E/N/T: OROPHARYNX:  normal mucosa, dentition, gingiva, and posterior pharynx;     RESPIRATORY: normal respiratory rate and pattern with no distress; normal breath sounds with no rales, rhonchi, wheezes or rubs;     CARDIOVASCULAR: normal rate; rhythm is regular;  no systolic murmur; no edema;     GASTROINTESTINAL: nontender; normal bowel sounds;     MUSCULOSKELETAL: normal gait; normal overall tone     PSYCHIATRIC: appropriate affect and demeanor; normal psychomotor function; normal speech pattern; normal thought and perception;         Lab/Test Results:         Urine temperature: confirmed (03/20/2020),     All urine drug screen levels confirmed negative: yes (03/20/2020),     Date and time of last pill: new script/AS (03/20/2020),     Performed by: JESSE (03/20/2020),     Collection Time: 1643 (03/20/2020),             Assessment:         I10   Essential (primary) hypertension       E78.0   Pure hypercholesterolemia       E03.8    Other specified hypothyroidism       F33.1   Major depressive disorder, recurrent, moderate       K21.9   Gastro-esophageal reflux disease without esophagitis       B02.8   Zoster with other complications       Z79.899   Other long term (current) drug therapy           ORDERS:         Meds Prescribed:       [New Rx] gabapentin 100 mg oral capsule [take 1-3 capsules by oral route 2 times per day as needed ], #60 (sixty) capsules, Refills: 0 (zero)       [New Rx] famotidine 20 mg oral tablet [take 1 tablet (20 mg) by oral route once daily as needed], #90 (ninety) tablets, Refills: 1 (one)         Lab Orders:       [id number]  TSH - [facility] TSH  (Send-Out)            [id number]  HTNLP - [facility] CMP AND LIPID: [id number], [id number]  (Send-Out)            APPTO  Appointment need  (In-House)            32939  Drug test prsmv qual dir optical obs per day  (In-House)              Other Orders:         Screening mammogram results documented  (Send-Out)                      Plan:         Essential (primary) hypertensionBP at goal.  No refills needed.  Checking labs.  RTC 6 months for PEx.    LABORATORY:  Labs ordered to be performed today include HTN/Lipid Panel: CMP, Lipid.  MIPS Vaccines Flu and Pneumonia updated in Shot record Screening mammomgram done within last 2 years and results in are chart     FOLLOW-UP: Schedule a follow-up visit in 6 months.:.  annual physical 30 min with Mac[place]          Orders:       [id number]  HTNLP - [facility] CMP AND LIPID: [id number], [id number]  (Send-Out)              Screening mammogram results documented  (Send-Out)            APPTO  Appointment need  (In-House)              Pure hypercholesterolemiaStable.  No refills needed.  Checking labs.        Other specified hypothyroidismStable.  No refills needed.  Checking labs.    LABORATORY:  Labs ordered to be performed today include TSH.            Orders:       [id number]  TSH - [facility] TSH  (Send-Out)              Major depressive disorder, recurrent, moderateStable.  No refills  "needed.        Gastro-esophageal reflux disease without esophagitisShe has been out of her omeprazole and is doing \"okay\" although she is having more gas than usual.  We are going to try her on Pepcid, but if this is not sufficient to control her sx, will plan to switch her back to omeprazole 40 mg.          Prescriptions:       [New Rx] famotidine 20 mg oral tablet [take 1 tablet (20 mg) by oral route once daily as needed], #90 (ninety) tablets, Refills: 1 (one)         Zoster with other complications+Shingles rash persists.  She has completed valacyclovir but still having pain, although rash does appear to be drying up.  I am going to start her on some gabapentin.  She can titrate between 100-300 mg BID as needed to help with the pain.  Tox screen and Rogerio run.  Contract signed.  RTC 6 months.          Prescriptions:       [New Rx] gabapentin 100 mg oral capsule [take 1-3 capsules by oral route 2 times per day as needed ], #60 (sixty) capsules, Refills: 0 (zero)         Other long term (current) drug therapy    Controlled substance documentation: Rogerio reviewed; drug screen performed and appropriate; consent is reviewed and signed and on the chart.  She is aware of risk of addiction on this medication, understands that she will need to follow up for a review every 3 months and her medications will be adjusted or decreased as deemed appropriate at each visit.  No history of drug or alcohol abuse.  No concerns about diversion or abuse. She denies side effects related to the medication.  She is aware that she may be called in for pill counts.  The dosing of this medication will be reviewed on a regular basis and reduced if possible..  Ongoing use of a controlled substance is necessary for this patient to have a normal quality of life     Drug screen Controlled Substance Contract has been ordered           Orders:       [id number]  Drug test prsmv qual dir optical obs per day  (In-House)                  Patient " Recommendations:        For  Essential (primary) hypertension:    Schedule a follow-up visit in 6 months.                APPOINTMENT INFORMATION:        Monday Tuesday Wednesday Thursday Friday Saturday Sunday            Time:___________________AM  PM   Date:_____________________             Charge Capture:         Primary Diagnosis:     I10  Essential (primary) hypertension           Orders:      [id number]  Office/outpatient visit; established patient, level 4  (In-House)            APPTO  Appointment need  (In-House)              E78.0  Pure hypercholesterolemia     E03.8  Other specified hypothyroidism     F33.1  Major depressive disorder, recurrent, moderate     K21.9  Gastro-esophageal reflux disease without esophagitis     B02.8  Zoster with other complications     Z79.899  Other long term (current) drug therapy           Orders:      [id number]  Drug test prsmv qual dir optical obs per day  (In-House)

## 2021-05-18 NOTE — PROGRESS NOTES
Lucina Payan  1965     Office/Outpatient Visit    Visit Date: Fri, Apr 16, 2021 02:39 pm    Provider: Brooke Clark MD (Assistant: Mickie Chandra MA)    Location: [facility]        Electronically signed by Brooke Clark MD on  04/16/2021 05:12:23 PM                             Subjective:        CC: Ms. Payan is a 55 year old White female.  Patient presents today for well woman exam (not taking Naproxen);         HPI:       Lucina is here today for her annual physical.  She is UTD on colonoscopy, last done 12/2019 and this showed diverticulosis.  She reports that she is due for pap smear.  She is due for mammogram, last done 5/2019 and this was normal.  She is UTD on flu (12/2020).  She is due for Shingrix and Td.  She is UTD on routine labs.        She has a corn on the R foot and it hurts with any shoe that she puts on.          In regard to the encounter for gynecological examination (general) (routine) without abnormal findings, her last physical exam was 2 years ago.  She is menopausal.  She is not currently using any form of contraception.  She performs breast self-exams occasionally.   Her last Pap smear was in 09/21/2017 and was normal.   Her last mammogram was in 05/21/2019 and was normal.   Preventative Health updated today.  Ms. Payan has had an abnormal Pap smear in the past. 30 years ago Tobacco: Current Smoker: She currently smokes every day, 1/2 to 1 pack per day.            PHQ-9 Depression Screening: Completed form scanned and in chart; Total Score 0     ROS:     CONSTITUTIONAL:  Negative for fatigue and fever.      EYES:  Negative for blurred vision.      E/N/T:  Negative for diminished hearing and nasal congestion.      CARDIOVASCULAR:  Negative for chest pain and palpitations.      RESPIRATORY:  Negative for recent cough and dyspnea.      GASTROINTESTINAL:  Negative for abdominal pain, constipation, diarrhea, nausea and vomiting.      GENITOURINARY:  Positive for  urinary incontinence and urgency.   Negative for dysuria or hematuria.      MUSCULOSKELETAL:  Negative for arthralgias and myalgias.      INTEGUMENTARY/BREAST:  Negative for atypical mole(s) and rash.      NEUROLOGICAL:  Negative for paresthesias and weakness.      PSYCHIATRIC:  Negative for anxiety, depression and sleep disturbance.          Past Medical History / Family History / Social History:         Last Reviewed on 2021 03:15 PM by Brooke Clark    Past Medical History:                 PAST MEDICAL HISTORY             GYNECOLOGICAL HISTORY:             CURRENT MEDICAL PROVIDERS:    Cardiologist: Dr. Denis    Obstetrician/Gynecologist         PREVENTIVE HEALTH MAINTENANCE             COLORECTAL CANCER SCREENING: Up to date (colonoscopy q10y; sigmoidoscopy q5y; Cologuard q3y) was last done 2019, Results are in chart; colonoscopy with the following abnormalities noted-- Diverticulosis     Hepatitis C Medicare Screening: was last done 10/2017     MAMMOGRAM: Done within last 2 years and results in are chart was last done 2019 with normal results     PAP SMEAR: was last done 2017 with normal results NIL, benign changes a/w atrophy, and neg HR-HPV (Dr. Wilkinson)         Surgical History:         Appendectomy    Cholecystectomy    Tonsillectomy/Adenoidectomy    Tubal Ligation      section: X 2;     GATRIC BYPASS;;;    PACEMAKER;;;     COLONOSCOPY: 2015-NEG;;         Family History:         Positive for Hypertension.      Positive for Breast Cancer and Colon Cancer.      Positive for Renal Stones.      Positive for Cerebrovascular Accident.      Positive for Type 1 Diabetes, Type 2 Diabetes and Hypothyroidism.          Social History:     Occupation:    Retired     Marital Status:          Tobacco/Alcohol/Supplements:     Last Reviewed on 2021 03:15 PM by Brooke Clark    Tobacco: Current Smoker: She currently smokes every day, 1/2 to 1 pack per day.          Alcohol:  Frequency:    RARE;     Caffeine:  She admits to consuming caffeine via -LITTLE.          Substance Abuse History:     Last Reviewed on 4/16/2021 03:15 PM by Brooke Clark    None         Mental Health History:     Last Reviewed on 4/16/2021 03:15 PM by Brooke Clark        Communicable Diseases (eg STDs):     Last Reviewed on 4/16/2021 03:15 PM by Brooke Clark        Current Problems:     Last Reviewed on 1/08/2021 01:51 PM by Felicity Brown    Other specified hypothyroidism    Pure hypercholesterolemia    Insomnia, unspecified    Thoracic aortic aneurysm, without rupture    Presence of cardiac pacemaker    Essential (primary) hypertension    Gastro-esophageal reflux disease without esophagitis    Primary osteoarthritis, right hand    Primary osteoarthritis, left hand    Bariatric surgery status    Zoster with other complications    Major depressive disorder, recurrent, moderate    Allergic rhinitis, unspecified    Other long term (current) drug therapy    Encounter for screening for other viral diseases    Contact with and (suspected) exposure to other viral communicable diseases    Urinary tract infection, site not specified    Abnormal uterine and vaginal bleeding, unspecified    Anemia, unspecified    Pain in left wrist        Immunizations:     influenza, high-dose, quadrivalent 12/4/2020    Fluzone (3 + years dose) 10/1/2016    Fluzone (3 + years dose) 10/17/2017    Fluzone Quadrivalent (3+ years) 2/5/2019        Allergies:     Last Reviewed on 4/16/2021 02:42 PM by Mickie Chandra    Demerol HCl:      Morphine Sulfate:      elsie silver:          Current Medications:     Last Reviewed on 4/16/2021 02:42 PM by Mickie Chandra    Pravastatin 20 mg oral tablet [1 tab q day hs]    Klor-Con M20 20 mEq oral Tablet, Extended Release Particles/Crystals [Take 1 tablet(s) by mouth bid]    osteo bi flex      Torsemide 20 mg oral tablet [Take 1 tablet(s) by mouth daily  ]    Diphenhydramine HCl 50mg Tablet  [Take 1 tablet(s) by mouth at bedtime prn for sleep]    Multivitamin/Mineral Supplement  Tablet [1 tablet daily.]    calcium 1200 plus D3 1000IU      metoprolol succinate 50 mg oral Tablet, Extended Release 24 hr [Take 1.5 tablet(s) by mouth daily]    levothyroxine 112 mcg oral tablet [TAKE ONE TABLET BY MOUTH EVERY MORNING **MUST CALL MD FOR APPOINTMENT]    Wellbutrin  mg oral Tablet, Extended Release 24 hr [TAKE ONE TABLET BY MOUTH DAILY]    ferrous sulfate 325 mg (65 mg iron) oral tablet [TAKE ONE TABLET BY MOUTH DAILY]    traZODone 100 mg oral tablet [TAKE ONE TABLET BY MOUTH AT BEDTIME]    Ibuprofen 600 mg oral tablet [1 tab tid PRN]    Irbesartan 150 mg oral tablet [Take 1 tablet(s) by mouth daily]    famotidine 40 mg oral tablet [take 1 tablet (40 mg) by oral route daily]    sertraline 50 mg oral tablet [TAKE THREE TABLETS BY MOUTH DAILY]    naproxen 500 mg oral tablet [take 1 tablet (500 mg) by oral route 2 times per day with food as needed]    HYDROcodone-acetaminophen 5-325 mg oral tablet [take 1 tablet by oral route every 4 hours as needed for pain]        Objective:        Vitals:         Current: 4/16/2021 2:43:46 PM    Ht:  5 ft, 6.5 in;  Wt: 182.8 lbs;  BMI: 29.1T: 97.1 F (temporal);  BP: 112/81 mm Hg (left arm, sitting);  P: 93 bpm (left arm (BP Cuff), sitting);  sCr: 0.87 mg/dL;  GFR: 79.17        Exams:     PHYSICAL EXAM:     GENERAL: vital signs recorded - well developed, well nourished;  well groomed;  no apparent distress;     EYES: extraocular movements intact; conjunctiva and cornea are normal; PERRLA;     RESPIRATORY: normal respiratory rate and pattern with no distress; normal breath sounds with no rales, rhonchi, wheezes or rubs;     CARDIOVASCULAR: normal rate; rhythm is regular;  no systolic murmur; no edema;     GASTROINTESTINAL: nontender; normal bowel sounds;     GENITOURINARY: Pap smear taken; external genitalia: multiple vulvar cysts; ;  vagina: normal with good pelvic support  and no lesions or discharge;;  cervix: normal appearance without lesions or discharge;;  uterus: normal size and position, well-supported;;  adnexa: normal with no masses or tenderness     LYMPHATIC: no axillary adenopathy;     BREAST/INTEGUMENT: breast exam: no overlying skin changes; no breast masses; small hard nodule with central opening R lateral plantar foot under 5th MTP; firm rubberymobile  nodule grape-sized R antecubital area, mildly TTP;     MUSCULOSKELETAL: normal gait; normal overall tone     NEUROLOGIC: mental status: alert and oriented x 3; Reflexes: brachioradialis: 2+; knee jerks: 2+;     PSYCHIATRIC:  appropriate affect and demeanor; normal speech pattern; grossly normal memory;         Assessment:         Z01.419   Encounter for gynecological examination (general) (routine) without abnormal findings       Z13.31   Encounter for screening for depression       L84   Corns and callosities       R22.31   Localized swelling, mass and lump, right upper limb           ORDERS:         Radiology/Test Orders:       [id number]  Screening mammography bi 2-view inc CAD  (Send-Out)            3017F  Colorectal CA screen results documented and reviewed (PV)  (In-House)            53999  Us lmtd joint/oth nonvasc xtr strux r-t w/img  (Send-Out)    SOFT TISSUE MASS RIGHT ANTECUBITAL AREA          Lab Orders:       [id number]  Cytopathology, slides, cervical or vaginal; manual screening under MD supervision  (Send-Out)            89352  [facility] Urinalysis, automated, with micro  (Send-Out)              Procedures Ordered:       REFER  Referral to Specialist or Other Facility  (Send-Out)            [id number]  Handlg&/or convey of spec for TR office to lab  (In-House)              Other Orders:         Depression screen negative  (In-House)              Screening mammogram results documented  (Send-Out)                      Plan:         Encounter for gynecological examination (general) (routine) without abnormal  findingsLucina is here today for her annual physical.  She is UTD on colonoscopy, last done 12/2019 and this showed diverticulosis.  She reports that she is due for pap smear.  She is due for mammogram, last done 5/2019 and this was normal.  She is UTD on flu (12/2020).  She is due for Shingrix and Td.  She is UTD on routine labs.  RTC 6 months.        Checking U/A d/t urgency.  If this is negative, plan to try her on some oxybutynin for bladder spasm.    LABORATORY:  Labs ordered to be performed today include urinalysis with micro.      RADIOLOGY:  I have ordered Mammogram Screening to be done today.  [facility] PHQ-9 Depression Screening: Completed form scanned and in chart; Total Score 0; Negative Depression Screen           Orders:       [id number]  Cytopathology, slides, cervical or vaginal; manual screening under MD supervision  (Send-Out)            [id number]  Screening mammography bi 2-view inc CAD  (Send-Out)              Depression screen negative  (In-House)              Screening mammogram results documented  (Send-Out)            3017F  Colorectal CA screen results documented and reviewed (PV)  (In-House)            90483  [facility] Urinalysis, automated, with micro  (Send-Out)            09899  Handlg&/or convey of spec for TR office to lab  (In-House)              Corns and callosities        REFERRALS:  Referral initiated to a podiatrist ( [facility]; for evaluation of corn R foot ).            Orders:       REFER  Referral to Specialist or Other Facility  (Send-Out)              Localized swelling, mass and lump, right upper limbShe noticed a mass in the R antecubital area in the past few weeks.  Seems to be growing larger, mildly TTP.  U/S ordered to evaluate.          Orders:       [id number]  Us lmtd joint/oth nonvasc xtr strux r-t w/img  (Send-Out)    SOFT TISSUE MASS RIGHT ANTECUBITAL AREA              Charge Capture:         Primary Diagnosis:     Z01.419  Encounter for  gynecological examination (general) (routine) without abnormal findings           Orders:      [id number]  Preventive medicine, established patient, age 40-64 years  (In-House)              Depression screen negative  (In-House)            3017F  Colorectal CA screen results documented and reviewed (PV)  (In-House)            35441  Handlg&/or convey of spec for TR office to lab  (In-House)              Z13.31  Encounter for screening for depression     L84  Corns and callosities     R22.31  Localized swelling, mass and lump, right upper limb

## 2021-05-18 NOTE — PROGRESS NOTES
"Lucina Payan  1965     Office/Outpatient Visit    Visit Date: Fri, Dec 4, 2020 10:35 am    Provider: Felicity Brown N.P. (Assistant: Mickie Chandra MA)    Location: [facility]        Electronically signed by Felicity Brown N.P. on  12/04/2020 03:59:36 PM                             Subjective:        CC: Ms. Payan is a 55 year old White female.  Patient presents today with complaints of bladder incontinence X 6 days;         HPI: 55-year-old female presenting to the office complaining of vaginal bleeding x1 day earlier this week.  She states that she has also had an increase of bladder incontinence.  She has a history of urge incontinence however over the last week, she has had complete bladder emptying without notice.  Yesterday she states that she felt \"crampy\". Patient does have history of postmenopausal bleeding.  She is due for a Pap.  Her previous gynecologist was following for postmenopausal bleeding and actually had to perform a D&C.    ROS:     CONSTITUTIONAL:  Negative for fatigue, fever and night sweats.      EYES:  Negative for blurred vision.      E/N/T:  Negative for diminished hearing and nasal congestion.      CARDIOVASCULAR:  Negative for chest pain, palpitations and pedal edema.      RESPIRATORY:  Negative for recent cough and dyspnea.      GASTROINTESTINAL:  Negative for abdominal pain, constipation, diarrhea, nausea and vomiting.      GENITOURINARY:  Negative for dysuria and urinary incontinence.      MUSCULOSKELETAL:  Positive for arthralgias and joint stiffness.   Negative for myalgias.      INTEGUMENTARY/BREAST:  Negative for atypical mole(s) and rash.      NEUROLOGICAL:  Negative for dizziness, paresthesias and weakness.      PSYCHIATRIC:  Negative for anxiety, depression, sleep disturbance and suicidal thoughts.          Past Medical History / Family History / Social History:         Last Reviewed on 12/04/2020 10:50 AM by Felicity Brown    Past Medical " History:                 PAST MEDICAL HISTORY             GYNECOLOGICAL HISTORY:             CURRENT MEDICAL PROVIDERS:    Cardiologist: Dr. Denis    Obstetrician/Gynecologist         PREVENTIVE HEALTH MAINTENANCE             COLORECTAL CANCER SCREENING: Up to date (colonoscopy q10y; sigmoidoscopy q5y; Cologuard q3y) was last done 2019, Results are in chart; colonoscopy with the following abnormalities noted-- Diverticulosis     Hepatitis C Medicare Screening: was last done 10/2017     MAMMOGRAM: Done within last 2 years and results in are chart was last done 2019 with normal results     PAP SMEAR: was last done 2017 with normal results NIL, benign changes a/w atrophy, and neg HR-HPV (Dr. Wilkinson)         Surgical History:         Appendectomy    Cholecystectomy    Tonsillectomy/Adenoidectomy    Tubal Ligation      section: X 2;     GATRIC BYPASS;;;    PACEMAKER;;;     COLONOSCOPY: 2015-NEG;;         Family History:         Positive for Hypertension.      Positive for Breast Cancer and Colon Cancer.      Positive for Renal Stones.      Positive for Cerebrovascular Accident.      Positive for Type 1 Diabetes, Type 2 Diabetes and Hypothyroidism.          Social History:     Occupation:    Retired     Marital Status:          Tobacco/Alcohol/Supplements:     Last Reviewed on 2020 10:50 AM by Felicity Brown    Tobacco: Current Smoker: She currently smokes every day, 1/2 to 1 pack per day.          Alcohol: Frequency:    RARE;     Caffeine:  She admits to consuming caffeine via -LITTLE.          Substance Abuse History:     Last Reviewed on 2020 10:50 AM by Felicity Brown    None         Mental Health History:     Last Reviewed on 3/16/2020 12:56 PM by Felicity Brown        Communicable Diseases (eg STDs):     Last Reviewed on 9/10/2019 09:04 AM by Brooke Clark        Immunizations:     Fluzone (3 + years dose) 10/1/2016    Fluzone (3 + years dose) 10/17/2017     Fluzone Quadrivalent (3+ years) 2/5/2019    influenza, high-dose, quadrivalent 12/4/2020        Allergies:     Last Reviewed on 12/04/2020 10:49 AM by Felicity Brown    Demerol HCl:      Morphine Sulfate:      elsie silver:          Current Medications:     Last Reviewed on 12/04/2020 10:49 AM by Felicity Brown    Metoprolol Succinate 50 mg oral Tablet, Extended Release 24 hr [Take 1.5 tablet(s) by mouth daily]    Pravastatin 20 mg oral tablet [1 tab q day hs]    Klor-Con M20 20 mEq oral Tablet, Extended Release Particles/Crystals [Take 1 tablet(s) by mouth bid]    osteo bi flex      Torsemide 20 mg oral tablet [Take 1 tablet(s) by mouth daily  ]    Diphenhydramine HCl 50mg Tablet [Take 1 tablet(s) by mouth at bedtime prn for sleep]    Multivitamin/Mineral Supplement  Tablet [1 tablet daily.]    calcium 1200 plus D3 1000IU      levothyroxine 112 mcg oral tablet [TAKE ONE TABLET BY MOUTH EVERY MORNING]    Wellbutrin  mg oral Tablet, Extended Release 24 hr [TAKE ONE TABLET BY MOUTH DAILY]    traZODone 100 mg oral tablet [TAKE ONE TABLET BY MOUTH AT BEDTIME]    ferrous sulfate 325 mg (65 mg iron) oral tablet [TAKE ONE TABLET BY MOUTH DAILY]    Ibuprofen 600 mg oral tablet [1 tab tid PRN]    Irbesartan 150 mg oral tablet [Take 1 tablet(s) by mouth daily]    famotidine 20 mg oral tablet [TAKE ONE TABLET BY MOUTH DAILY AS NEEDED]    sertraline 50 mg oral tablet [TAKE THREE TABLETS BY MOUTH DAILY]        Objective:        Vitals:         Current: 12/4/2020 10:42:34 AM    Ht:  5 ft, 6.5 in;  Wt: 184 lbs;  BMI: 29.3T: 96.6 F (temporal);  BP: 108/55 mm Hg (right arm, sitting);  P: 78 bpm (right arm (BP Cuff), sitting);  sCr: 0.89 mg/dL;  GFR: 77.61        Exams:     PHYSICAL EXAM:     GENERAL: vital signs recorded - well developed, well nourished;  well groomed;  no apparent distress;     EYES: extraocular movements intact; conjunctiva and cornea are normal; PERRLA;     RESPIRATORY: normal respiratory rate and  pattern with no distress; normal breath sounds with no rales, rhonchi, wheezes or rubs;     CARDIOVASCULAR: normal rate; rhythm is regular;  no systolic murmur; no edema;     GASTROINTESTINAL: nontender; normal bowel sounds;     GENITOURINARY: external genitalia: condyloma; ; vagina: normal mucosa; atrophic mucosa;  clear, non-odorous vaginal discharge; ; cervix: Not visualized. Pt did not tolerate exam well d/t tenderness noted;     MUSCULOSKELETAL: normal gait; normal overall tone     NEUROLOGIC: mental status: alert and oriented x 3;         Lab/Test Results:         Glucose, Urine: Neg (12/04/2020),     Bilirubin, urine: Negative (12/04/2020),     Ketones, Urine Strip: Negative (12/04/2020),     Specific Gravity, urine: 1.030 (12/04/2020),     Blood in Urine: moderate (12/04/2020),     pH, urine: 6.0 (12/04/2020),     Protein Urine QL: 100 mg (12/04/2020),     Urobilinogen, urine: 0.2 E.U./dL (12/04/2020),     Nitrite, Urine: Positive (12/04/2020),     Leukoctyes, urine: Trace (12/04/2020),     Appearance: Clear (12/04/2020),     collection source: Clean-catch (12/04/2020),     Color: Yellow (12/04/2020),     Performed by:: AS (12/04/2020),             Assessment:         N39.0   Urinary tract infection, site not specified       N93.9   Abnormal uterine and vaginal bleeding, unspecified           ORDERS:         Meds Prescribed:       [New Rx] sulfamethoxazole-trimethoprim 800-160 mg oral tablet [take 1 tablet by oral route BID], #14 (fourteen) tablets, Refills: 0 (zero)       [New Rx] phenazopyridine 200 mg oral tablet [take 1 tablet (200 mg) by oral route 3 times per day after meals as needed], #9 (nine) tablets, Refills: 0 (zero)         Radiology/Test Orders:       [id number]  Ultrasound, transvaginal  (Send-Out)              Lab Orders:       [id number]  Urinalysis, automated, without microscopy  (In-House)            [id number]  [facility] - [facility] Urine Culture  (Send-Out)                      Plan:         Urinary tract  infection, site not specifiedPatient to complete prescribed antibiotic.  Pyridium as needed.  Will notify patient if antibiotic will need to be changed.  If incontinence does not improve along with UTI, will send to urology. Patient to monitor for worsening signs of infection and notify office with any acute concerns or issues.  Patient to go to ED if office not available.  Patient verbalizes understanding and has no further questions upon discharge.          Prescriptions:       [New Rx] sulfamethoxazole-trimethoprim 800-160 mg oral tablet [take 1 tablet by oral route BID], #14 (fourteen) tablets, Refills: 0 (zero)       [New Rx] phenazopyridine 200 mg oral tablet [take 1 tablet (200 mg) by oral route 3 times per day after meals as needed], #9 (nine) tablets, Refills: 0 (zero)           Orders:       [id number]  Urinalysis, automated, without microscopy  (In-House)            14161  [facility] - [facility] Urine Culture  (Send-Out)              Abnormal uterine and vaginal bleeding, unspecifiedDue to patient's history, will go ahead and order pelvic ultrasound for further evaluation.  Will notify patient of results.        RADIOLOGY:  I have ordered Pelvic Ultrasound Transvaginal to be done today.            Orders:       [id number]  Ultrasound, transvaginal  (Send-Out)                  Charge Capture:         Primary Diagnosis:     N39.0  Urinary tract infection, site not specified           Orders:      [id number]  Office/outpatient visit; established patient, level 4  (In-House)            [id number]  Urinalysis, automated, without microscopy  (In-House)              N93.9  Abnormal uterine and vaginal bleeding, unspecified

## 2021-05-18 NOTE — PROGRESS NOTES
Lucina Payan  1965     Office/Outpatient Visit    Visit Date:  01:30 pm    Provider: Felicity Brown N.P. (Assistant: Mickie Chandra MA)    Location: [facility]        Electronically signed by Felicity Brown N.P. on  2021 03:01:29 PM                             Subjective:        CC: Ms. Payan is a 55 year old White female.  Patient presents today for medication refills;         HPI: 55-year-old female presenting to the office for routine follow-up.  She is requesting refills and needing labs drawn. She is requesting for famotidine to be increased as she still has some breakthrough heartburn.    ROS:     CONSTITUTIONAL:  Negative for fatigue, fever and night sweats.      EYES:  Negative for blurred vision.      CARDIOVASCULAR:  Negative for chest pain, palpitations and pedal edema.      RESPIRATORY:  Negative for recent cough and dyspnea.      GASTROINTESTINAL:  Negative for abdominal pain, constipation, diarrhea, nausea and vomiting.      MUSCULOSKELETAL:  Negative for myalgias.      INTEGUMENTARY/BREAST:  Negative for atypical mole(s) and rash.      NEUROLOGICAL:  Negative for dizziness, paresthesias and weakness.      PSYCHIATRIC:  Negative for anxiety and depression.          Past Medical History / Family History / Social History:         Last Reviewed on 2021 01:51 PM by Felicity Brown    Past Medical History:                 PAST MEDICAL HISTORY             GYNECOLOGICAL HISTORY:             CURRENT MEDICAL PROVIDERS:    Cardiologist: Dr. Denis    Obstetrician/Gynecologist         PREVENTIVE HEALTH MAINTENANCE             COLORECTAL CANCER SCREENING: Up to date (colonoscopy q10y; sigmoidoscopy q5y; Cologuard q3y) was last done 2019, Results are in chart; colonoscopy with the following abnormalities noted-- Diverticulosis     Hepatitis C Medicare Screening: was last done 10/2017     MAMMOGRAM: Done within last 2 years and results in are  chart was last done 2019 with normal results     PAP SMEAR: was last done 2017 with normal results NIL, benign changes a/w atrophy, and neg HR-HPV (Dr. Wilkinson)         Surgical History:         Appendectomy    Cholecystectomy    Tonsillectomy/Adenoidectomy    Tubal Ligation      section: X 2;     GATRIC BYPASS;;;    PACEMAKER;;;     COLONOSCOPY: 2015-NEG;;         Family History:         Positive for Hypertension.      Positive for Breast Cancer and Colon Cancer.      Positive for Renal Stones.      Positive for Cerebrovascular Accident.      Positive for Type 1 Diabetes, Type 2 Diabetes and Hypothyroidism.          Social History:     Occupation:    Retired     Marital Status:          Tobacco/Alcohol/Supplements:     Last Reviewed on 2021 01:51 PM by Felicity Brown    Tobacco: Current Smoker: She currently smokes every day, 1/2 to 1 pack per day.          Alcohol: Frequency:    RARE;     Caffeine:  She admits to consuming caffeine via -LITTLE.          Substance Abuse History:     Last Reviewed on 2021 01:51 PM by Felicity Brown    None         Mental Health History:     Last Reviewed on 2021 01:51 PM by Felicity Brown        Communicable Diseases (eg STDs):     Last Reviewed on 2021 01:51 PM by Felicity Brown        Immunizations:     influenza, high-dose, quadrivalent 2020    Fluzone (3 + years dose) 10/1/2016    Fluzone (3 + years dose) 10/17/2017    Fluzone Quadrivalent (3+ years) 2019        Allergies:     Last Reviewed on 2021 01:51 PM by Felicity Brown    Demerol HCl:      Morphine Sulfate:      elsie silver:          Current Medications:     Last Reviewed on 2021 01:51 PM by Felicity Brown    Metoprolol Succinate 50 mg oral Tablet, Extended Release 24 hr [Take 1.5 tablet(s) by mouth daily]    Pravastatin 20 mg oral tablet [1 tab q day hs]    Klor-Con M20 20 mEq oral Tablet, Extended Release Particles/Crystals [Take 1  tablet(s) by mouth bid]    osteo bi flex      Torsemide 20 mg oral tablet [Take 1 tablet(s) by mouth daily  ]    Diphenhydramine HCl 50mg Tablet [Take 1 tablet(s) by mouth at bedtime prn for sleep]    Multivitamin/Mineral Supplement  Tablet [1 tablet daily.]    calcium 1200 plus D3 1000IU      levothyroxine 112 mcg oral tablet [TAKE ONE TABLET BY MOUTH EVERY MORNING **MUST CALL MD FOR APPOINTMENT]    Wellbutrin  mg oral Tablet, Extended Release 24 hr [TAKE ONE TABLET BY MOUTH DAILY]    traZODone 100 mg oral tablet [TAKE ONE TABLET BY MOUTH AT BEDTIME]    ferrous sulfate 325 mg (65 mg iron) oral tablet [TAKE ONE TABLET BY MOUTH DAILY]    Ibuprofen 600 mg oral tablet [1 tab tid PRN]    Irbesartan 150 mg oral tablet [Take 1 tablet(s) by mouth daily]    famotidine 20 mg oral tablet [TAKE ONE TABLET BY MOUTH DAILY AS NEEDED]    sertraline 50 mg oral tablet [TAKE THREE TABLETS BY MOUTH DAILY]        Objective:        Vitals:         Current: 1/8/2021 1:33:56 PM    Ht:  5 ft, 6.5 in;  Wt: 187.2 lbs;  BMI: 29.8T: 96.3 F (temporal);  BP: 140/88 mm Hg (right arm, sitting);  P: 96 bpm (right arm (BP Cuff), sitting);  sCr: 0.89 mg/dL;  GFR: 78.18        Repeat:     2:11:12 PM  BP:   131/87mm Hg (left arm, sitting, HR: 96)     Exams:     PHYSICAL EXAM:     GENERAL: vital signs recorded - well developed, well nourished;  well groomed;  no apparent distress;     EYES: extraocular movements intact; conjunctiva and cornea are normal; PERRLA;     RESPIRATORY: normal respiratory rate and pattern with no distress; normal breath sounds with no rales, rhonchi, wheezes or rubs;     CARDIOVASCULAR: normal rate; rhythm is regular;  no systolic murmur; no edema;     LYMPHATIC: no enlargement of cervical or facial nodes;     BREAST/INTEGUMENT: No rash or jaundice;     MUSCULOSKELETAL: normal gait; normal overall tone     NEUROLOGIC: mental status: alert and oriented x 3;     PSYCHIATRIC:  appropriate affect and demeanor; normal speech  pattern; grossly normal memory;         Assessment:         E03.8   Other specified hypothyroidism       I10   Essential (primary) hypertension       D64.9   Anemia, unspecified       G47.00   Insomnia, unspecified       K21.9   Gastro-esophageal reflux disease without esophagitis       F33.1   Major depressive disorder, recurrent, moderate           ORDERS:         Meds Prescribed:       [Refilled] metoprolol succinate 50 mg oral Tablet, Extended Release 24 hr [Take 1.5 tablet(s) by mouth daily], #135 (one hundred and thirty five) tablets, Refills: 1 (one)       [Refilled] ferrous sulfate 325 mg (65 mg iron) oral tablet [TAKE ONE TABLET BY MOUTH DAILY], #90 (ninety) tablets, Refills: 1 (one)       [Refilled] sertraline 50 mg oral tablet [TAKE THREE TABLETS BY MOUTH DAILY], #260 (two hundred and sixty) tablets, Refills: 1 (one)       [Refilled] famotidine 40 mg oral tablet [take 1 tablet (40 mg) by oral route daily], #90 (ninety) tablets, Refills: 1 (one)       [Refilled] traZODone 100 mg oral tablet [TAKE ONE TABLET BY MOUTH AT BEDTIME], #90 (ninety) tablets, Refills: 1 (one)       [Refilled] levothyroxine 112 mcg oral tablet [TAKE ONE TABLET BY MOUTH EVERY MORNING **MUST CALL MD FOR APPOINTMENT], #90 (ninety) tablets, Refills: 1 (one)       [Refilled] Wellbutrin  mg oral Tablet, Extended Release 24 hr [TAKE ONE TABLET BY MOUTH DAILY], #90 (ninety) tablets, Refills: 1 (one)         Lab Orders:       [id number]  TSH - [facility] TSH  (Send-Out)            [id number]  HTNLP - [facility] CMP AND LIPID: [id number], [id number]  (Send-Out)            41615  BDCBC - [facility] CBC with 3 part diff  (Send-Out)            00535  FERR - [facility] Ferritin Serum  (Send-Out)            82325  RETEC - [facility] Reticulocyte count  (Send-Out)            [id number]  IRONP - [facility] Iron and TIBC  (Send-Out)                      Plan:         Other specified hypothyroidismWill notify patient of laboratory results.  Continue to take all medications as prescribed.  Will notify patient if  any prescriptions need to be changed due to labs.  Patient is to follow-up with PCP.  She is to notify office with any acute concerns or issues prior to next scheduled visit.  Patient agrees with plan of care, verbalizes understanding and has no further questions upon discharge.    LABORATORY:  Labs ordered to be performed today include TSH.            Prescriptions:       [Refilled] levothyroxine 112 mcg oral tablet [TAKE ONE TABLET BY MOUTH EVERY MORNING **MUST CALL MD FOR APPOINTMENT], #90 (ninety) tablets, Refills: 1 (one)           Orders:       [id number]  TSH - [facility] TSH  (Send-Out)              Essential (primary) hypertensionSee above    LABORATORY:  Labs ordered to be performed today include HTN/Lipid Panel: CMP, Lipid.            Prescriptions:       [Refilled] metoprolol succinate 50 mg oral Tablet, Extended Release 24 hr [Take 1.5 tablet(s) by mouth daily], #135 (one hundred and thirty five) tablets, Refills: 1 (one)           Orders:       [id number]  HTNLP - [facility] CMP AND LIPID: [id number], [id number]  (Send-Out)              Anemia, unspecifiedSee above    LABORATORY:  Labs ordered to be performed today include Anemia profile CBC ferritin Reticulocyte ct Serum iron.            Prescriptions:       [Refilled] ferrous sulfate 325 mg (65 mg iron) oral tablet [TAKE ONE TABLET BY MOUTH DAILY], #90 (ninety) tablets, Refills: 1 (one)           Orders:       [id number]  BDCBC - [facility] CBC with 3 part diff  (Send-Out)            07873  FERR - [facility] Ferritin Serum  (Send-Out)            31873  RETEC - [facility] Reticulocyte count  (Send-Out)            30251  IRONP - [facility] Iron and TIBC  (Send-Out)              Insomnia, unspecifiedSee above          Prescriptions:       [Refilled] traZODone 100 mg oral tablet [TAKE ONE TABLET BY MOUTH AT BEDTIME], #90 (ninety) tablets, Refills: 1 (one)         Gastro-esophageal reflux disease without esophagitisIncreased from 20mg to 40mg daily. Notify office sooner than scheduled visit with pcp, if not helping with  symptoms. Pt v/u          Prescriptions:       [Refilled] famotidine 40 mg oral tablet [take 1 tablet (40 mg) by oral route daily], #90 (ninety) tablets, Refills: 1 (one)         Major depressive disorder, recurrent, moderatesee above          Prescriptions:       [Refilled] sertraline 50 mg oral tablet [TAKE THREE TABLETS BY MOUTH DAILY], #260 (two hundred and sixty) tablets, Refills: 1 (one)       [Refilled] Wellbutrin  mg oral Tablet, Extended Release 24 hr [TAKE ONE TABLET BY MOUTH DAILY], #90 (ninety) tablets, Refills: 1 (one)             Charge Capture:         Primary Diagnosis:     E03.8  Other specified hypothyroidism           Orders:      [id number]  Office/outpatient visit; established patient, level 4  (In-House)              I10  Essential (primary) hypertension     D64.9  Anemia, unspecified     G47.00  Insomnia, unspecified     K21.9  Gastro-esophageal reflux disease without esophagitis     F33.1  Major depressive disorder, recurrent, moderate

## 2021-05-18 NOTE — PROGRESS NOTES
Lucina Payan  1965     Office/Outpatient Visit    Visit Date: Mon, Mar 16, 2020 11:52 am    Provider: Felicity Brown N.P. (Assistant: Mickie Chandra MA)    Location: [facility]        Electronically signed by Felicity Brown N.P. on  2020 01:00:41 PM                             Subjective:        CC: Ms. Payan is a 54 year old White female.  Patient presents today with complaints of rash on left side of hip X 4 days;         HPI: 53 y/o female presenting to clinic c/o rash to left abd/hip that is now wrapping around to back X 4 days. She had chicken pox as a child. She states that area burned and was itching x 2 days prior to blisters coming. She states that blisters are not draining.     ROS:     CONSTITUTIONAL:  Negative for chills, fatigue and fever.      CARDIOVASCULAR:  Negative for chest pain, palpitations and pedal edema.      RESPIRATORY:  Negative for recent cough and dyspnea.      GASTROINTESTINAL:  Negative for abdominal pain, constipation, diarrhea, nausea and vomiting.      MUSCULOSKELETAL:  Negative for myalgias.      INTEGUMENTARY/BREAST:  Positive for pruritis and rash.      NEUROLOGICAL:  Negative for dizziness, paresthesias and weakness.          Past Medical History / Family History / Social History:         Last Reviewed on 3/16/2020 12:56 PM by Felicity Brown    Past Medical History:                 PAST MEDICAL HISTORY             GYNECOLOGICAL HISTORY:             CURRENT MEDICAL PROVIDERS:    Cardiologist: Dr. Denis    Obstetrician/Gynecologist         PREVENTIVE HEALTH MAINTENANCE             Hepatitis C Medicare Screening: was last done 10/2017     MAMMOGRAM: Done within last 2 years and results in are chart was last done 2019 with normal results     PAP SMEAR: was last done 2017 with normal results NIL, benign changes a/w atrophy, and neg HR-HPV (Dr. Wilkinson)         Surgical History:         Appendectomy    Cholecystectomy     Tonsillectomy/Adenoidectomy    Tubal Ligation      section: X 2;     GATRIC BYPASS;;;    PACEMAKER;;;     COLONOSCOPY: 2015-NEG;;         Family History:         Positive for Hypertension.      Positive for Breast Cancer and Colon Cancer.      Positive for Renal Stones.      Positive for Cerebrovascular Accident.      Positive for Type 1 Diabetes, Type 2 Diabetes and Hypothyroidism.          Social History:     Occupation:    Retired     Marital Status:          Tobacco/Alcohol/Supplements:     Last Reviewed on 3/16/2020 12:56 PM by Felicity Brown    Tobacco: She has a past history of cigarette smoking; quit date:  10/2019.          Alcohol: Frequency:    RARE;     Caffeine:  She admits to consuming caffeine via -LITTLE.          Substance Abuse History:     Last Reviewed on 3/16/2020 12:56 PM by Felicity Brown    None         Mental Health History:     Last Reviewed on 3/16/2020 12:56 PM by Felicity Brown        Communicable Diseases (eg STDs):     Last Reviewed on 9/10/2019 09:04 AM by Brooke Clark        Immunizations:     Fluzone (3 + years dose) 10/1/2016    Fluzone (3 + years dose) 10/17/2017    Fluzone Quadrivalent (3+ years) 2019        Allergies:     Last Reviewed on 3/16/2020 12:56 PM by Felicity Brown    Demerol HCl:      Morphine Sulfate:      elsie silver:          Current Medications:     Last Reviewed on 3/16/2020 12:56 PM by Felicity Brown    Metoprolol Succinate 50 mg oral Tablet, Extended Release 24 hr [Take 1.5 tablet(s) by mouth daily]    Pravastatin 20 mg oral tablet [1 tab q day hs]    Klor-Con M20 20 mEq oral Tablet, Extended Release Particles/Crystals [Take 1 tablet(s) by mouth bid]    osteo bi flex      Torsemide 20 mg oral tablet [Take 1 tablet(s) by mouth daily  ]    Diphenhydramine HCl 50mg Tablet [Take 1 tablet(s) by mouth at bedtime prn for sleep]    Multivitamin/Mineral Supplement  Tablet [1 tablet daily.]    calcium 1200 plus D3 1000IU       levothyroxine 112 mcg oral tablet [TAKE ONE TABLET BY MOUTH EVERY MORNING]    ferrous sulfate 325 mg (65 mg iron) oral tablet [TAKE ONE TABLET BY MOUTH DAILY]    sertraline 100 mg oral tablet [TAKE ONE AND ONE-HALF TABLET BY MOUTH DAILY]    traZODone 100 mg oral tablet [TAKE ONE TABLET BY MOUTH AT BEDTIME]    Ibuprofen 600 mg oral tablet [1 tab tid PRN]    Irbesartan 150 mg oral tablet [Take 1 tablet(s) by mouth daily]        Objective:        Vitals:         Current: 3/16/2020 11:57:52 AM    Ht:  5 ft, 6.5 in;  Wt: 195.8 lbs;  BMI: 31.1T: 97.9 F (oral);  BP: 91/66 mm Hg (right arm, sitting);  P: 87 bpm (right arm (BP Cuff), sitting);  sCr: 0.76 mg/dL;  GFR: 94.41        Exams:     PHYSICAL EXAM:     GENERAL: vital signs recorded - well developed, well nourished;  well groomed;  no apparent distress;     EYES: conjunctiva and cornea are normal; PERRLA;     RESPIRATORY: normal respiratory rate and pattern with no distress; normal breath sounds with no rales, rhonchi, wheezes or rubs;     CARDIOVASCULAR: normal rate; rhythm is regular;  no systolic murmur; no edema;     LYMPHATIC: no enlargement of cervical or facial nodes;     BREAST/INTEGUMENT: vesicular rash to left lower abd wrapping around to back. Underlying erythema. no drainage or signs of infection;     MUSCULOSKELETAL: normal gait; normal overall tone     NEUROLOGIC: mental status: alert and oriented x 3;         Assessment:         B02.8   Zoster with other complications       Z13.31   Encounter for screening for depression           ORDERS:         Meds Prescribed:       [New Rx] valACYclovir 1 gram oral tablet [take 1 tablet (1,000 mg) by oral route 3 times per day x 7 days], #21 (twenty one) tablets, Refills: 0 (zero)       [New Rx] predniSONE 20 mg oral tablet [take 1 tablet by mouth daily x 5 days], #5 (five) tablets, Refills: 0 (zero)         Other Orders:         Depression screen negative  (In-House)                      Plan:         Zoster  with other complicationsComplete rx antiviral medication along with steroid. Monitor BP while on steroid. Pt to notify clinic if pain is not helped with steroid and gabapentin potentially can be used. Discussed when contagious, encouraged frequent hand washing and not sharing towels, sheets, etc. Pt v/u and had no further questions upon d/c.           Prescriptions:       [New Rx] valACYclovir 1 gram oral tablet [take 1 tablet (1,000 mg) by oral route 3 times per day x 7 days], #21 (twenty one) tablets, Refills: 0 (zero)       [New Rx] predniSONE 20 mg oral tablet [take 1 tablet by mouth daily x 5 days], #5 (five) tablets, Refills: 0 (zero)         Encounter for screening for depression    MIPS PHQ-9 Depression Screening: Completed form scanned and in chart; Total Score 4; Negative Depression Screen           Orders:         Depression screen negative  (In-House)                  Charge Capture:         Primary Diagnosis:     B02.8  Zoster with other complications           Orders:      [id number]  Office/outpatient visit; established patient, level 3  (In-House)              Z13.31  Encounter for screening for depression           Orders:        Depression screen negative  (In-House)

## 2021-05-18 NOTE — PROGRESS NOTES
Lucina Payan 1965     Office/Outpatient Visit    Visit Date:  03:32 pm    Provider: Ziggy Garcia MD (Assistant: Kamala Santana MA)    Location: [facility]        Electronically signed by Ziggy Garcia MD on  2019 09:02:42 AM                             SUBJECTIVE:        CC:     Ms. Payan is a 53 year old White female.  presents today due to dysuria         HPI:     Pain with urination, urgency and no control of bladder when urgency happens. This has been for 2 days. She has lower back pain, no fever. No discharge or other vaginal Sx. She is , last sexual activity was 1 month ago, no recent antibiotics. She took a home UTI test that was positive for leukocytesterase. She took Azo last night.     ROS:     CONSTITUTIONAL:  Negative for fatigue and fever.      CARDIOVASCULAR:  Negative for chest pain and palpitations.      RESPIRATORY:  Negative for recent cough and dyspnea.      GASTROINTESTINAL:  Negative for abdominal pain, constipation, diarrhea, nausea and vomiting.      GENITOURINARY:  Positive for dysuria and urinary incontinence urge incontinence.      MUSCULOSKELETAL:  Negative for arthralgias and myalgias.      PSYCHIATRIC:  Positive for anxiety, depression and anhedonia.   Negative for crying spells, feelings of stress, mood swings, difficulty concentrating, sadness or sleep disturbance.          PMH/FMH/:     Last Reviewed on 2019 04:03 PM by Ziggy Garcia    Past Medical History:                 PAST MEDICAL HISTORY             GYNECOLOGICAL HISTORY:             CURRENT MEDICAL PROVIDERS:    Cardiologist: Dr. Denis    Obstetrician/Gynecologist         PREVENTIVE HEALTH MAINTENANCE             Hepatitis C Medicare Screening: was last done 10/2017         Surgical History:         Appendectomy    Cholecystectomy    Tonsillectomy/Adenoidectomy    Tubal Ligation       section: X 2;     GATRIC BYPASS;;;    PACEMAKER;;;      COLONOSCOPY: 2015-NEG;;         Family History:         Positive for Hypertension.      Positive for Breast Cancer and Colon Cancer.      Positive for Renal Stones.      Positive for Cerebrovascular Accident.      Positive for Type 1 Diabetes, Type 2 Diabetes and Hypothyroidism.          Social History:     Occupation:    Retired     Marital Status:          Tobacco/Alcohol/Supplements:     Last Reviewed on 6/07/2019 04:03 PM by Ziggy Garcia    Tobacco: Current Smoker: She currently smokes every day, 1-5 cigarettes per day.          Alcohol: Frequency:    RARE;     Caffeine:  She admits to consuming caffeine via -LITTLE.          Substance Abuse History:     Last Reviewed on 6/07/2019 04:03 PM by Ziggy Garcia    None         Mental Health History:     Last Reviewed on 6/07/2019 04:03 PM by Ziggy Garcia        Communicable Diseases (eg STDs):     Last Reviewed on 6/07/2019 04:03 PM by Ziggy Garcia            Current Problems:     Last Reviewed on 6/07/2019 04:03 PM by Ziggy Garcia    Osteoarthritis of hand(s)     Bariatric surgery status     GERD     Bradycardia     Sebaceous cyst     HTN     Acquired hypothyroidism     Allergies     Insomnia     Depression responsive to treatment     Thoracic aortic aneurysm, without rupture     High cholesterol     Patient with cardiac pacemaker     Dysuria         Immunizations:     Fluzone (3 + years dose) 10/1/2016     Fluzone (3 + years dose) 10/17/2017     Fluzone Quadrivalent (3+ years) 2/5/2019         Allergies:     Last Reviewed on 6/07/2019 04:03 PM by Ziggy Garcia    Demerol HCl:    Morphine Sulfate:    elsie silver:        Current Medications:     Last Reviewed on 6/07/2019 04:03 PM by Ziggy Garcia    Sertraline HCl 100mg Tablet 1 and 1/2 tablet daily.     Synthroid 0.1mg Tablet 1 tab daily     Diphenhydramine HCl 50mg Tablet Take 1 tablet(s) by mouth at bedtime prn for sleep     Klor-Con M20 20mEq Tablets, Extended Release Take 1  tablet(s) by mouth bid     Lisinopril 40mg Tablet 1 tab daily     Metoprolol Succinate 50mg Tablets, Extended Release Take 1 tablet(s) by mouth daily     Pravastatin 20mg Tablet 1 tab q day hs     Torsemide 20mg Tablet Take 1 tablet(s) by mouth daily     Ferrous Sulfate 325mg Tablets 1 TAB DAILY     Wellbutrin XL 300mg Tablets, Extended Release 1 tab daily     Ibuprofen 600mg Tablet 1 tab tid PRN     Allegra Allergy 24 Hour 180mg Tablet 1 tab daily     calcium 1200 plus D3 1000IU     Multivitamin/Mineral Supplement Tablet 1 tablet daily.     osteo bi flex     Losartan 50mg Tablet 1 tab daily         OBJECTIVE:        Vitals:         Current: 6/7/2019 3:37:36 PM    Ht:  5 ft, 6.5 in;  Wt: 203.8 lbs;  BMI: 32.4    T: 98.2 F (oral);  BP: 98/65 mm Hg (left arm, sitting);  P: 73 bpm (left arm (BP Cuff), sitting);  sCr: 0.77 mg/dL;  GFR: 95.88        Exams:     PHYSICAL EXAM:     GENERAL: vital signs recorded - well developed, well nourished;  well groomed;  no apparent distress;     EYES: extraocular movements intact; conjunctiva and cornea are normal; PERRLA;     E/N/T: OROPHARYNX:  normal mucosa, dentition, gingiva, and posterior pharynx;     RESPIRATORY: normal respiratory rate and pattern with no distress; normal breath sounds with no rales, rhonchi, wheezes or rubs;     CARDIOVASCULAR: normal rate; rhythm is regular;  no systolic murmur; no edema;     GASTROINTESTINAL: nontender;     MUSCULOSKELETAL: normal gait; normal overall tone     PSYCHIATRIC: appropriate affect and demeanor; normal psychomotor function; normal speech pattern; normal thought and perception;         ASSESSMENT           788.1   R30.0  Dysuria              DDx:         ORDERS:         Meds Prescribed:       Keflex (Cephalexin) 500mg Capsules Take 1 capsule two times daily for 7 days.  #14 (Fourteen) capsule(s) Refills: 0         Lab Orders:       [id number]  [facility] Urinalysis, automated, with micro  (Send-Out)                   PLAN:           Dysuria Keflex prescribed as UA c/w infection. UCx pending.           Prescriptions:       Keflex (Cephalexin) 500mg Capsules Take 1 capsule two times daily for 7 days.  #14 (Fourteen) capsule(s) Refills: 0           Orders:       [id number]  [facility] Urinalysis, automated, with micro  (Send-Out)               CHARGE CAPTURE           **Please note: ICD descriptions below are intended for billing purposes only and may not represent clinical diagnoses**        Primary Diagnosis:         788.1 Dysuria            R30.0    Dysuria              Orders:          [id number]   Office/outpatient visit; established patient, level 3  (In-House)

## 2021-05-18 NOTE — PROGRESS NOTES
Lucina Payan  1965     Office/Outpatient Visit    Visit Date:  11:09 am    Provider: Sandi Monroy N.P. (Assistant: Ilia Weinberg MA)    Location: [facility]        Electronically signed by Sandi Monroy N.P. on  2021 02:39:34 PM                             Subjective:        CC: Ms. Payan is a 55 year old White female.  C/O left wrist pain after fall;         HPI: Lucina presents with c/o left wrist pain for 2 weeks. Fell while shoveling snow and injured. Has been wearing brace. Taking Ibuprofen. Swelling improved some but still with pain.    ROS:         CARDIOVASCULAR:  Negative for chest pain and palpitations.      RESPIRATORY:  Negative for dyspnea and frequent wheezing.      GASTROINTESTINAL:  Negative for abdominal pain and vomiting.      MUSCULOSKELETAL:  Positive for left wrist pain.      NEUROLOGICAL:  Negative for dizziness and headaches.          Past Medical History / Family History / Social History:         Last Reviewed on 2021 01:51 PM by Felicity Brown    Past Medical History:                 PAST MEDICAL HISTORY             GYNECOLOGICAL HISTORY:             CURRENT MEDICAL PROVIDERS:    Cardiologist: Dr. Denis    Obstetrician/Gynecologist         PREVENTIVE HEALTH MAINTENANCE             COLORECTAL CANCER SCREENING: Up to date (colonoscopy q10y; sigmoidoscopy q5y; Cologuard q3y) was last done 2019, Results are in chart; colonoscopy with the following abnormalities noted-- Diverticulosis     Hepatitis C Medicare Screening: was last done 10/2017     MAMMOGRAM: Done within last 2 years and results in are chart was last done 2019 with normal results     PAP SMEAR: was last done 2017 with normal results NIL, benign changes a/w atrophy, and neg HR-HPV (Dr. Wilkinson)         Surgical History:         Appendectomy    Cholecystectomy    Tonsillectomy/Adenoidectomy    Tubal Ligation      section: X 2;     GATRIC  BYPASS;;;    PACEMAKER;;;     COLONOSCOPY: 2015-NEG;;         Family History:         Positive for Hypertension.      Positive for Breast Cancer and Colon Cancer.      Positive for Renal Stones.      Positive for Cerebrovascular Accident.      Positive for Type 1 Diabetes, Type 2 Diabetes and Hypothyroidism.          Social History:     Occupation:    Retired     Marital Status:          Tobacco/Alcohol/Supplements:     Last Reviewed on 1/08/2021 01:51 PM by Felicity Brown    Tobacco: Current Smoker: She currently smokes every day, 1/2 to 1 pack per day.          Alcohol: Frequency:    RARE;     Caffeine:  She admits to consuming caffeine via -LITTLE.          Substance Abuse History:     Last Reviewed on 1/08/2021 01:51 PM by Felicity Brown    None         Mental Health History:     Last Reviewed on 1/08/2021 01:51 PM by Felicity Brown        Communicable Diseases (eg STDs):     Last Reviewed on 1/08/2021 01:51 PM by Felicity Brown        Current Problems:     Last Reviewed on 1/08/2021 01:51 PM by Felicity Brown    Thoracic aortic aneurysm, without rupture    Presence of cardiac pacemaker    Pure hypercholesterolemia    Other specified hypothyroidism    Insomnia, unspecified    Essential (primary) hypertension    Gastro-esophageal reflux disease without esophagitis    Bariatric surgery status    Primary osteoarthritis, right hand    Primary osteoarthritis, left hand    Zoster with other complications    Other long term (current) drug therapy    Allergic rhinitis, unspecified    Major depressive disorder, recurrent, moderate    Encounter for screening for other viral diseases    Contact with and (suspected) exposure to other viral communicable diseases    Urinary tract infection, site not specified    Abnormal uterine and vaginal bleeding, unspecified    Anemia, unspecified        Immunizations:     influenza, high-dose, quadrivalent 12/4/2020    Fluzone (3 + years dose) 10/1/2016    Fluzone  (3 + years dose) 10/17/2017    Fluzone Quadrivalent (3+ years) 2/5/2019        Allergies:     Last Reviewed on 2/26/2021 11:16 AM by Ilia Weinberg    Demerol HCl:      Morphine Sulfate:      elsie silver:          Current Medications:     Last Reviewed on 2/26/2021 11:16 AM by Ilia Weinberg    Pravastatin 20 mg oral tablet [1 tab q day hs]    Klor-Con M20 20 mEq oral Tablet, Extended Release Particles/Crystals [Take 1 tablet(s) by mouth bid]    osteo bi flex      Torsemide 20 mg oral tablet [Take 1 tablet(s) by mouth daily  ]    Diphenhydramine HCl 50mg Tablet [Take 1 tablet(s) by mouth at bedtime prn for sleep]    Multivitamin/Mineral Supplement  Tablet [1 tablet daily.]    calcium 1200 plus D3 1000IU      levothyroxine 112 mcg oral tablet [TAKE ONE TABLET BY MOUTH EVERY MORNING **MUST CALL MD FOR APPOINTMENT]    metoprolol succinate 50 mg oral Tablet, Extended Release 24 hr [Take 1.5 tablet(s) by mouth daily]    Wellbutrin  mg oral Tablet, Extended Release 24 hr [TAKE ONE TABLET BY MOUTH DAILY]    traZODone 100 mg oral tablet [TAKE ONE TABLET BY MOUTH AT BEDTIME]    ferrous sulfate 325 mg (65 mg iron) oral tablet [TAKE ONE TABLET BY MOUTH DAILY]    Ibuprofen 600 mg oral tablet [1 tab tid PRN]    Irbesartan 150 mg oral tablet [Take 1 tablet(s) by mouth daily]    famotidine 40 mg oral tablet [take 1 tablet (40 mg) by oral route daily]    sertraline 50 mg oral tablet [TAKE THREE TABLETS BY MOUTH DAILY]        Objective:        Vitals:         Historical:     1/8/2021  BP:   131/87 mm Hg ( (left arm, , sitting, );) 1/8/2021  P:   96bpm ( (right arm (BP Cuff), , sitting, );) 1/8/2021  Wt:   187.2lbs    Current: 2/26/2021 11:17:12 AM    Ht:  5 ft, 6.5 in;  Wt: 194 lbs;  BMI: 30.8T: 98.3 F (temporal);  BP: 154/93 mm Hg (left arm, sitting);  P: 86 bpm (right arm (BP Cuff), sitting);  sCr: 0.87 mg/dL;  GFR: 81.20        Exams:     PHYSICAL EXAM:     GENERAL: well developed, well nourished;  no apparent  distress;     RESPIRATORY: normal respiratory rate and pattern with no distress; normal breath sounds with no rales, rhonchi, wheezes or rubs;     CARDIOVASCULAR: normal rate; rhythm is regular;     MUSCULOSKELETAL: decreased range of motion noted in: left wrist due to pain;  pain with range of motion in: left wrist flexion and extension;  mild amount of swelling noted to left wrist, brisk capillary refill, pedal pulses palpable;     NEUROLOGIC: mental status: alert and oriented x 3; GROSSLY INTACT     PSYCHIATRIC: appropriate affect and demeanor;         Procedures:     Pain in left wrist    1. Toradol 30 mg given IM in the right hip; administered by mb;  lot number [id number]; expires 05/22 Patient experienced no reaction.              Assessment:         M25.532   Pain in left wrist           ORDERS:         Meds Prescribed:       [New Rx] naproxen 500 mg oral tablet [take 1 tablet (500 mg) by oral route 2 times per day with food as needed], #60 (sixty) tablets, Refills: 0 (zero)       [New Rx] HYDROcodone-acetaminophen 5-325 mg oral tablet [take 1 tablet by oral route every 4 hours as needed for pain], #10 (ten) tablets, Refills: 0 (zero)         Radiology/Test Orders:       [id number]  Left radiologic examination; wrist, complete three views  (Send-Out; Stat)              Other Orders:       [id number]  Therapeutic injection  (In-House)              Toradol, per 15 mg  (x2)                  Plan:         Pain in left wrist        RADIOLOGY:  I have ordered a left wrist x-ray to be done today.      RECOMMENDATIONS given include: Further recommendation to be given after test results are complete.  Narcotic or pain medication Toradol 30 mg     FOLLOW-UP: Chronic visit follow up:Patient already scheduled for follow-up appointment with PCP           Prescriptions:       [New Rx] naproxen 500 mg oral tablet [take 1 tablet (500 mg) by oral route 2 times per day with food as needed], #60 (sixty) tablets, Refills: 0 (zero)        [New Rx] HYDROcodone-acetaminophen 5-325 mg oral tablet [take 1 tablet by oral route every 4 hours as needed for pain], #10 (ten) tablets, Refills: 0 (zero)           Orders:       [id number]  Therapeutic injection  (In-House)              Toradol, per 15 mg  (x2)        [id number]  Left radiologic examination; wrist, complete three views  (Send-Out; Stat)                  Charge Capture:         Primary Diagnosis:     M25.532  Pain in left wrist           Orders:      [id number]  Office/outpatient visit; established patient, level 3  (In-House)            [id number]  Therapeutic injection  (In-House)              Toradol, per 15 mg  (x2)

## 2021-05-18 NOTE — PROGRESS NOTES
Lucina Payan 1965     Office/Outpatient Visit    Visit Date: Wed, Feb 13, 2019 12:57 pm    Provider: Brooke Clark MD (Assistant: Dina Porter MA)    Location: [facility]        Electronically signed by Brooke Clark MD on  02/13/2019 01:35:43 PM                             SUBJECTIVE:        CC:     Ms. Payan is a 53 year old White female.  She is here today following a transition of care from an inpatient hospital: [facility] for colonenteritis. The patient was admitted on 2/11-12/2019. During the patient's hospital stay the patient was treated by Dr. Schwarz.  Pt was seen in the clinic within 48 hours of discharge, so no call was made.         HPI: Lucina is here today for PATY appt following admission to [facility] from 2/11/19 to 2/12/19 for chest pains.  She was admitted from the ED after she had an elevated D-dimer in the ED.  Subsequent CT chest PE protocol was negative for PE.         She was discahrged yesterday with costochondritis.  The pain seems to be worse today than  yesterday.  Pain is strong pressure with sharp jabs if she moves her arm or shoulder.  Pain started on 2/11/19 around 13:30.  Pain started out intermittent but quickly became constant.  She has a recliner and reached over the wooden handle, pressing her chest against it.  That was 2 days before the chest pain started.  She has SOB with the sharp jabs of pain.  +Pleuritic.  She was sent home  with a course of ibuprofen.  She took an old Norco that she had at home.      ROS:     CONSTITUTIONAL:  Negative for fatigue and fever.      CARDIOVASCULAR:  Negative for chest pain, dizziness, orthopnea, palpitations, paroxysmal nocturnal dyspnea, pedal edema and tachycardia.      RESPIRATORY:  Negative for recent cough and dyspnea.      GASTROINTESTINAL:  Negative for abdominal pain, constipation, diarrhea, nausea and vomiting.      MUSCULOSKELETAL:  Negative for arthralgias and myalgias.          PMH/FMH/SH:     Last Reviewed  on 2019 01:11 PM by Brooke Clark    Past Medical History:                 PAST MEDICAL HISTORY             GYNECOLOGICAL HISTORY:             CURRENT MEDICAL PROVIDERS:    Cardiologist: Dr. Denis    Obstetrician/Gynecologist         PREVENTIVE HEALTH MAINTENANCE             Hepatitis C Medicare Screening: was last done 10/2017         Surgical History:         Appendectomy    Cholecystectomy    Tonsillectomy/Adenoidectomy    Tubal Ligation       section: X 2;     GATRIC BYPASS;;;    PACEMAKER;;;     COLONOSCOPY: 2015-NEG;;         Family History:         Positive for Hypertension.      Positive for Breast Cancer and Colon Cancer.      Positive for Renal Stones.      Positive for Cerebrovascular Accident.      Positive for Type 1 Diabetes, Type 2 Diabetes and Hypothyroidism.          Social History:     Occupation:    Retired     Marital Status:          Tobacco/Alcohol/Supplements:     Last Reviewed on 2019 01:11 PM by Brooke Clark    Tobacco: Current Smoker: She currently smokes every day, 1-5 cigarettes per day.          Alcohol: Frequency:    RARE;     Caffeine:  She admits to consuming caffeine via -LITTLE.          Substance Abuse History:     Last Reviewed on 2019 01:11 PM by Brooke Clark    None         Mental Health History:     Last Reviewed on 2019 01:11 PM by Brooke Clark        Communicable Diseases (eg STDs):     Last Reviewed on 2019 01:11 PM by Brooke Clark            Current Problems:     Last Reviewed on 2019 03:48 PM by Brooke Clark    Osteoarthritis of hand(s)     Bariatric surgery status     GERD     Bradycardia     Sebaceous cyst     HTN     Acquired hypothyroidism     Allergies     Insomnia     Depression responsive to treatment     Thoracic aortic aneurysm, without rupture     High cholesterol     Patient with cardiac pacemaker         Immunizations:     Fluzone (3 + years dose) 10/1/2016     Fluzone (3 + years dose)  10/17/2017     Fluzone Quadrivalent (3+ years) 2/5/2019         Allergies:     Last Reviewed on 2/05/2019 03:48 PM by Brooke Clark    Demerol HCl:    Morphine Sulfate:        Current Medications:     Last Reviewed on 2/05/2019 03:48 PM by Brooke Clark    Sertraline HCl 100mg Tablet 1 and 1/2 tablet daily.     Synthroid 0.1mg Tablet 1 tab daily     Aspirin (ASA) 81mg Tablets, Enteric Coated 1 tab daily     Diphenhydramine HCl 50mg Tablet Take 1 tablet(s) by mouth at bedtime prn for sleep     Klor-Con M20 20mEq Tablets, Extended Release Take 1 tablet(s) by mouth bid     Lisinopril 40mg Tablet 1 tab daily     Metoprolol Succinate 50mg Tablets, Extended Release Take 1 tablet(s) by mouth daily     Pravastatin 20mg Tablet 1 tab q day hs     Torsemide 20mg Tablet Take 1 tablet(s) by mouth daily     Ferrous Sulfate 325mg Tablets 1 TAB DAILY     Omeprazole 40mg Capsules, Extended Release 1 capsule daily     Trazodone HCl 100mg Tablet 1 tab at bedtime     Voltaren  1% Topical Gel Apply 4 g to affected area up to 4 times a day     Wellbutrin XL 300mg Tablets, Extended Release 1 tab daily     Allegra Allergy 24 Hour 180mg Tablet 1 tab daily     OTC Amberen (for hot flashes) supplement     calcium 1200 plus D3 1000IU     Multivitamin/Mineral Supplement Tablet 1 tablet daily.     osteo bi flex     probiotic         OBJECTIVE:        Vitals:         Current: 2/13/2019 1:09:32 PM    Ht:  5 ft, 6.5 in;  Wt: 224.4 lbs;  BMI: 35.7    T: 97.5 F (oral);  BP: 126/84 mm Hg (left arm, sitting);  P: 62 bpm (left arm (BP Cuff), sitting);  sCr: 0.77 mg/dL;  GFR: 99.88        Exams:     PHYSICAL EXAM:     GENERAL: vital signs recorded - well developed, well nourished;  well groomed;  seems to be in pain;     EYES: extraocular movements intact; conjunctiva and cornea are normal; PERRLA;     E/N/T: OROPHARYNX:  normal mucosa, dentition, gingiva, and posterior pharynx;     RESPIRATORY: normal respiratory rate and pattern with no distress;  normal breath sounds with no rales, rhonchi, wheezes or rubs;     CARDIOVASCULAR: normal rate; rhythm is regular;  no systolic murmur; no edema;     MUSCULOSKELETAL: normal gait; normal overall tone L chest  wall TTP (severe) along costochondral margin;     PSYCHIATRIC: affect/demeanor: tearful;         ASSESSMENT           786.59   M94.0  Chest pain, other type              DDx:         ORDERS:         Meds Prescribed:       Medrol (Methylprednisolone) 4mg Dosepak Take as directed with food  #1 (One) dose pack Refills: 0       Hydrocodone/Acetaminophen 5mg/325mg Tablet 1 tab every 4 to 6 hours prn  #15 (Fifteen) tablet(s) Refills: 0                 PLAN:          Chest pain, other type Having severe chest wall pain along L costochondral margin, due to costochondritis.  Cardiac workup during her recent admission (including CTA chest) was all okay.  Physical exam confirms costochondritis, although she is extremely tender.  She is in tears, it is so painful today.  I will send in a three day supply of hydrocodone/APAP per state law, [place] run  today, as well as a Medrol dosepack to try to speed up the healing process.  Continue to ice the area.  Avoid NSAID use until steroid course is finished.  Work note given for today and Friday.  She should call back if sx do not improve.           Prescriptions:       Medrol (Methylprednisolone) 4mg Dosepak Take as directed with food  #1 (One) dose pack Refills: 0       Hydrocodone/Acetaminophen 5mg/325mg Tablet 1 tab every 4 to 6 hours prn  #15 (Fifteen) tablet(s) Refills: 0           Patient Education Handouts:       [facility] [place] [facility]              CHARGE CAPTURE           **Please note: ICD descriptions below are intended for billing purposes only and may not represent clinical diagnoses**        Primary Diagnosis:         786.59 Chest pain, other type            M94.0    Chondrocostal junction syndrome [Tietze]              Orders:          [id number]   Transitional care  manage service 14 day discharge  (In-House)

## 2021-05-18 NOTE — PROGRESS NOTES
Lucina Payan 1965     Office/Outpatient Visit    Visit Date: Thu, Sep 26, 2019 01:26 pm    Provider: Renetta Whitney N.P. (Assistant: Mindy Servin MA)    Location: [facility]        Electronically signed by Renetta Whitney N.P. on  2019 08:15:56 PM                             SUBJECTIVE:        CC:     Ms. Payan is a 53 year old White female.  She presents with fall, landed on bottom, pain in tailbone that radiates outward, some sharp pains.          HPI:         Ms. Payan presents with coccyx pain.  Other details: fell at home one wk ago.  stood in recliner to hang picture.  fell onto buttocks.  has noted some cocyx pain since then.  sits on soft pillow.  takes ibuprofen 800 mg q d - bid with some relief.  has affected sleep mildly.  occasional pain radiates to sacrum..      ROS:     CONSTITUTIONAL:  Negative for fatigue and fever.      CARDIOVASCULAR:  Negative for chest pain, palpitations, tachycardia, orthopnea, and edema.      RESPIRATORY:  Negative for cough, dyspnea, and hemoptysis.      GASTROINTESTINAL:  Negative for abdominal pain, heartburn, constipation, diarrhea, and stool changes.      GENITOURINARY:  Negative for genital lesions, hematuria, menstrual problems, polyuria, abnormal vaginal bleeding, and vaginal discharge.      MUSCULOSKELETAL:  Positive for coccyx and sacral pain.      NEUROLOGICAL:  Negative for dizziness, headaches, paresthesias, and weakness.      PSYCHIATRIC:  Negative for anxiety, depression, and sleep disturbances.          PMH/FMH/SH:     Last Reviewed on 9/10/2019 09:04 AM by Brooke Clark    Past Medical History:                 PAST MEDICAL HISTORY             GYNECOLOGICAL HISTORY:             CURRENT MEDICAL PROVIDERS:    Cardiologist: Dr. Denis    Obstetrician/Gynecologist         PREVENTIVE HEALTH MAINTENANCE             Hepatitis C Medicare Screening: was last done 10/2017     MAMMOGRAM: Done within last 2 years and results in are  chart was last done 2019 with normal results     PAP SMEAR: was last done 2017 with normal results NIL, benign changes a/w atrophy, and neg HR-HPV (Dr. Wilkinson)         Surgical History:         Appendectomy    Cholecystectomy    Tonsillectomy/Adenoidectomy    Tubal Ligation       section: X 2;     GATRIC BYPASS;;;    PACEMAKER;;;     COLONOSCOPY: 2015-NEG;;         Family History:         Positive for Hypertension.      Positive for Breast Cancer and Colon Cancer.      Positive for Renal Stones.      Positive for Cerebrovascular Accident.      Positive for Type 1 Diabetes, Type 2 Diabetes and Hypothyroidism.          Social History:     Occupation:    Retired     Marital Status:          Tobacco/Alcohol/Supplements:     Last Reviewed on 9/10/2019 09:04 AM by Brooke Clark    Tobacco: Current Smoker: She currently smokes every day, 1-5 cigarettes per day.          Alcohol: Frequency:    RARE;     Caffeine:  She admits to consuming caffeine via -LITTLE.          Substance Abuse History:     Last Reviewed on 9/10/2019 09:04 AM by Brooke Clark    None         Mental Health History:     Last Reviewed on 9/10/2019 09:04 AM by Brooke Clark        Communicable Diseases (eg STDs):     Last Reviewed on 9/10/2019 09:04 AM by Brooke Clark            Current Problems:     Last Reviewed on 9/10/2019 09:04 AM by Brooke Clark    Screening for cancer of colon     Osteoarthritis of hand(s)     Bariatric surgery status     GERD     Bradycardia     HTN     Acquired hypothyroidism     Allergies     Insomnia     Depression responsive to treatment     Thoracic aortic aneurysm, without rupture     High cholesterol     Patient with cardiac pacemaker     Screening for depression         Immunizations:     Fluzone (3 + years dose) 10/1/2016     Fluzone (3 + years dose) 10/17/2017     Fluzone Quadrivalent (3+ years) 2019         Allergies:     Last Reviewed on 9/10/2019 09:04 AM by Brooke Clark     Demerol HCl:    Morphine Sulfate:    elsie silver:        Current Medications:     Last Reviewed on 9/26/2019 01:31 PM by Mindy Servin    Sertraline HCl 100mg Tablet 1 and 1/2 tablet daily.     Trazodone HCl 100mg Tablet 1 tab at bedtime     Synthroid 0.112mg Tablet 1 daily in the morning     Diphenhydramine HCl 50mg Tablet Take 1 tablet(s) by mouth at bedtime prn for sleep     Klor-Con M20 20mEq Tablets, Extended Release Take 1 tablet(s) by mouth bid     Metoprolol Succinate 50mg Tablets, Extended Release Take 1.5 tablet(s) by mouth daily     Pravastatin 20mg Tablet 1 tab q day hs     Torsemide 20mg Tablet Take 1 tablet(s) by mouth daily     Wellbutrin XL 300mg Tablets, Extended Release 1 tab daily     Ferrous Sulfate 325mg Tablets 1 TAB DAILY     Ibuprofen 600mg Tablet 1 tab tid PRN     calcium 1200 plus D3 1000IU     Multivitamin/Mineral Supplement Tablet 1 tablet daily.     osteo bi flex     Irbesartan 150mg Tablet Take 1 tablet(s) by mouth daily         OBJECTIVE:        Vitals:         Historical:     09/10/2019  BP:   120/70 mm Hg ( (left arm, , sitting, );)     09/10/2019  Wt:   195.8lbs        Current: 9/26/2019 1:34:43 PM    Ht:  5 ft, 6.5 in;  Wt: 193.4 lbs;  BMI: 30.7    T: 97.9 F (oral);  BP: 102/71 mm Hg (right arm, sitting);  P: 70 bpm (right arm (BP Cuff), sitting);  sCr: 0.76 mg/dL;  GFR: 95.00        Exams:     PHYSICAL EXAM:     GENERAL: no apparent distress;     RESPIRATORY: normal respiratory rate and pattern with no distress; normal breath sounds with no rales, rhonchi, wheezes or rubs;     CARDIOVASCULAR: normal rate; rhythm is regular;     MUSCULOSKELETAL: pain with range of motion in: pain at sacrum and coccyx when sitting;     NEUROLOGIC: mental status: alert and oriented x 3; GROSSLY INTACT     PSYCHIATRIC:  appropriate affect and demeanor; normal speech pattern; grossly normal memory;         ASSESSMENT           724.79   M53.88  Coccyx pain              DDx:         ORDERS:          Meds Prescribed:       Meloxicam 7.5mg Tablet 1 bid prn pain.  take with food.  do not take with tylenol, ibuprofen, or other NSAIDs.  #30 (Thirty) tablet(s) Refills: 0         Radiology/Test Orders:       [id number]  X-ray sacrum and coccyx  (Send-Out)                   PLAN:          Coccyx pain         RADIOLOGY:  I have ordered Sacrum and Coccyx X-ray to be done today.      apply cold,  sit on soft pillow or inflatable ring.            Prescriptions:       Meloxicam 7.5mg Tablet 1 bid prn pain.  take with food.  do not take with tylenol, ibuprofen, or other NSAIDs.  #30 (Thirty) tablet(s) Refills: 0           Orders:       [id number]  X-ray sacrum and coccyx  (Send-Out)               CHARGE CAPTURE           **Please note: ICD descriptions below are intended for billing purposes only and may not represent clinical diagnoses**        Primary Diagnosis:         724.79 Coccyx pain            M53.88    Other specified dorsopathies, sacral and sacrococcygeal region              Orders:          [id number]   Office/outpatient visit; established patient, level 3  (In-House)

## 2021-05-18 NOTE — PROGRESS NOTES
Lucina Payan 1965     Office/Outpatient Visit    Visit Date: Tue, Feb 5, 2019 03:37 pm    Provider: Brooke Clark MD (Assistant: Mickie Chandra MA)    Location: [facility]        Electronically signed by Brooke Clark MD on  02/05/2019 04:19:38 PM                             SUBJECTIVE:        CC:     Ms. Payan is a 53 year old White female.  This is a follow-up visit.  Patient presents today for follow up;         HPI: Lucina is here today to follow up on routine issues.        She is on lisinopril and metoprolol succinate for HTN.  She is on torsemide.  She sees Dr. Denis Cardiology.  She is s/p pacemaker placemaker placement.  No CP, palpitations, SOB.  BP has been well controlled.          She is on pravastatin for HLD. No myalgias.        She is on Synthroid for hypothyroidism.  No heat/cold intolerance, no changes in hair or skin.  She is due for labs.        She is on Zoloft 150 mg and Wellbutrin 150 mg for depression.  She is still having some problems with depressed mood, motivation, anhedonia, and irritability.  No mood swings or crying spells.  Appetite good.  Energy is fair.  Concentration is usually okay.  She is on trazodone 100 mg for sleep and that works great.  She was seeing a psychologist Rufina Floyd at [facility] in [facility] up until April of last  year.          She is on omeprazole 40 mg OTC for indigestion.   She is also on iron supplement.  She has h/o gastric bypass so she requires the supplement.        She is having a lot of postnasal drip.  She is still taking her Flonase.  She has tried Allegra and Mucinex.     ROS:     CONSTITUTIONAL:  Positive for unintentional weight gain ( gradual ).   Negative for fatigue or fever.      CARDIOVASCULAR:  Negative for chest pain and palpitations.      RESPIRATORY:  Negative for recent cough and dyspnea.      GASTROINTESTINAL:  Negative for abdominal pain, constipation, diarrhea, nausea and vomiting.      MUSCULOSKELETAL:   Negative for arthralgias and myalgias.      PSYCHIATRIC:  Positive for anxiety, depression and anhedonia.   Negative for crying spells, feelings of stress, mood swings, difficulty concentrating, sadness or sleep disturbance.          PMH/FMH/SH:     Last Reviewed on 2019 03:48 PM by Brooke Clark    Past Medical History:                 PAST MEDICAL HISTORY             GYNECOLOGICAL HISTORY:             CURRENT MEDICAL PROVIDERS:    Cardiologist: Dr. Denis    Obstetrician/Gynecologist         PREVENTIVE HEALTH MAINTENANCE             Hepatitis C Medicare Screening: was last done 10/2017         Surgical History:         Appendectomy    Cholecystectomy    Tonsillectomy/Adenoidectomy    Tubal Ligation       section: X 2;     GATRIC BYPASS;;;    PACEMAKER;;;     COLONOSCOPY: 2015-NEG;;         Family History:         Positive for Hypertension.      Positive for Breast Cancer and Colon Cancer.      Positive for Renal Stones.      Positive for Cerebrovascular Accident.      Positive for Type 1 Diabetes, Type 2 Diabetes and Hypothyroidism.          Social History:     Occupation:    Retired     Marital Status:          Tobacco/Alcohol/Supplements:     Last Reviewed on 2019 03:48 PM by Brooke Clark    Tobacco: Current Smoker: She currently smokes every day, 1-5 cigarettes per day.          Alcohol: Frequency:    RARE;     Caffeine:  She admits to consuming caffeine via -LITTLE.          Substance Abuse History:     Last Reviewed on 2019 03:48 PM by Brooke Clark    None         Mental Health History:     Last Reviewed on 2019 03:48 PM by Brooke Clark        Communicable Diseases (eg STDs):     Last Reviewed on 2019 03:48 PM by Brooke Calrk            Current Problems:     Last Reviewed on 10/17/2017 09:23 AM by Brooke Clark    Bradycardia     Sebaceous cyst     HTN     Acquired hypothyroidism     Allergies     Insomnia     Depression responsive to treatment      Thoracic aortic aneurysm, without rupture     High cholesterol     Patient with cardiac pacemaker         Immunizations:     Fluzone (3 + years dose) 10/1/2016     Fluzone (3 + years dose) 10/17/2017         Allergies:     Last Reviewed on 10/17/2017 09:23 AM by Brooke Clark    Demerol HCl:    Morphine Sulfate:        Current Medications:     Last Reviewed on 10/17/2017 09:23 AM by Brooke Clark    Synthroid 0.1mg Tablet 1 tab daily     Citalopram Hydrobromide 40mg Tablet 1 tab daily     Aspirin (ASA) 81mg Tablets, Enteric Coated 1 tab daily     Diphenhydramine HCl 50mg Tablet Take 1 tablet(s) by mouth at bedtime prn for sleep     Klor-Con M20 20mEq Tablets, Extended Release Take 1 tablet(s) by mouth bid     Lisinopril 40mg Tablet 1 tab daily     Metoprolol Succinate 50mg Tablets, Extended Release Take 1 tablet(s) by mouth daily     Pravastatin 20mg Tablet 1 tab q day hs     Torsemide 20mg Tablet Take 1 tablet(s) by mouth daily     Allegra Allergy 24 Hour 180mg Tablet 1 tab daily     OTC Amberen (for hot flashes) supplement     calcium 1200 plus D3 1000IU     Multivitamin/Mineral Supplement Tablet 1 tablet daily.     osteo bi flex     poly iron 150 forte     probiotic         OBJECTIVE:        Vitals:         Current: 2/5/2019 3:41:37 PM    Ht:  5 ft, 6.5 in;  Wt: 217.6 lbs;  BMI: 34.6    T: 98.2 F (oral);  BP: 113/73 mm Hg (left arm, sitting);  P: 83 bpm (finger clip, sitting);  sCr: 0.83 mg/dL;  GFR: 91.46        Exams:     PHYSICAL EXAM:     GENERAL: vital signs recorded - well developed, well nourished;  well groomed;  no apparent distress;     EYES: extraocular movements intact; conjunctiva and cornea are normal; PERRLA;     E/N/T: OROPHARYNX:  normal mucosa, dentition, gingiva, and posterior pharynx;     RESPIRATORY: normal respiratory rate and pattern with no distress; normal breath sounds with no rales, rhonchi, wheezes or rubs;     CARDIOVASCULAR: normal rate; rhythm is regular;  no systolic murmur; no  edema;     MUSCULOSKELETAL: normal gait; normal overall tone     PSYCHIATRIC: appropriate affect and demeanor; normal psychomotor function; normal speech pattern; normal thought and perception;         ASSESSMENT           296.25   F34.1  Depression responsive to treatment              DDx:     780.52   G47.00  Insomnia              DDx:     401.1   I10  HTN              DDx:     272.0   E78.0  High cholesterol              DDx:     V45.01   Z95.0  Patient with cardiac pacemaker              DDx:     244.8   E03.8  Acquired hypothyroidism              DDx:     530.81   K21.9  GERD              DDx:     V45.86   Z98.84  Bariatric surgery status              DDx:     715.14   M19.041   M19.042  Osteoarthritis of hand(s)              DDx:         ORDERS:         Meds Prescribed:       Refill of: Synthroid (Levothyroxine Sodium) 0.1mg Tablet 1 tab daily  #90 (Ninety) tablet(s) Refills: 0       Refill of: Sertraline HCl 100mg Tablet 1 and 1/2 tablet daily.  #135 ([place]) tablet(s) Refills: 1       Refill of: Trazodone HCl 100mg Tablet 1 tab at bedtime  #90 (Ninety) tablet(s) Refills: 1       Refill of: Wellbutrin XL (Bupropion HCl) 300mg Tablets, Extended Release 1 tab daily  #30 (Thirty) tablet(s) Refills: 5       Voltaren  (Diclofenac Sodium) 1% Topical Gel Apply 4 g to affected area up to 4 times a day  #100 ([place]) gm Refills: 2       Omeprazole 40mg Capsules, Extended Release 1 capsule daily  #90 (Ninety) capsule(s) Refills: 1       Ferrous Sulfate 325mg Tablets 1 TAB DAILY  #90 (Ninety) tablet(s) Refills: 30         Lab Orders:       [id number]  TSH - [facility] TSH  (Send-Out)         82961  HTNLP - [facility] CMP AND LIPID: [id number], [id number]  (Send-Out)         11371  B12FO - [facility] Vitamin B12 with Folate  (Send-Out)         34136  BDCB2 - [facility] CBC w/o diff  (Send-Out)         80729  IRONP - [facility] Iron and TIBC  (Send-Out)         00381  PTHIN - [facility] PTH, intact  (Send-Out)         68240  VITD - [facility] Vitamin D, 25  Hydroxy  (Send-Out)         63110  ZINCP - [facility] Zinc  (Send-Out)         63651   - [facility] Copper, serum  (Send-Out)         33622  VB1T - [facility] Thiamine (Vitamin B-1) blood  (Send-Out)         APPTO  Appointment need  (In-House)           Procedures Ordered:       REFER  Referral to Specialist or Other Facility  (Send-Out)                   PLAN:          Depression responsive to treatment Still having some trouble with depression.  Already maxed out on Zoloft (over recommended max, actually), so will try increasing Wellbutrin to 300 mg daily.  She will let me know if she has any problems.  Continue trazodone for sleep, which is working great.  RTC 6 months  for annual physical.         FOLLOW-UP: Schedule a follow-up visit in 6 months..  annual physical 30 min with Amber           Prescriptions:       Refill of: Sertraline HCl 100mg Tablet 1 and 1/2 tablet daily.  #135 ([place] [address] [place]) tablet(s) Refills: 1       Refill of: Trazodone HCl 100mg Tablet 1 tab at bedtime  #90 (Ninety) tablet(s) Refills: 1       Refill of: Wellbutrin XL (Bupropion HCl) 300mg Tablets, Extended Release 1 tab daily  #30 (Thirty) tablet(s) Refills: 5           Orders:       APPTO  Appointment need  (In-House)             Patient Education Handouts:       [facility] [place]           Insomnia As above.          HTN BP at goal.  No refills needed.   Checking labs.  No refills needed today.     LABORATORY:  Labs ordered to be performed today include HTN/Lipid Panel: CMP, Lipid.            Orders:       [id number]  HTNLP - [facility] CMP AND LIPID: [id number], [id number]  (Send-Out)            High cholesterol Stable.   Checking labs.  No refills needed today.          Patient with cardiac pacemaker She would like to switch cardiologists from Dr. Denis to Dr. Light.  Dr. Denis has mostly been monitoring her pacemaker ([employer], has had no problems reportedly) as well as an ascending aortic aneurysm.  Referral placed.  Will include past records.          REFERRALS:  Referral initiated to a cardiologist ( Dr. Yayo Light, [facility]; pacemaker status ).  **please include last 2 cardiology notes from Dr. Denis**           Orders:       REFER  Referral to Specialist or Other Facility  (Send-Out)            Acquired hypothyroidism Checking labs.  Refills sent.     LABORATORY:  Labs ordered to be performed today include TSH.            Prescriptions:       Refill of: Synthroid (Levothyroxine Sodium) 0.1mg Tablet 1 tab daily  #90 (Ninety) tablet(s) Refills: 0           Orders:       [id number]  TSH - [facility] TSH  (Send-Out)            GERD           Prescriptions:       Omeprazole 40mg Capsules, Extended Release 1 capsule daily  #90 (Ninety) capsule(s) Refills: 1          Bariatric surgery status Due for labs.     LABORATORY:  Labs ordered to be performed today include B12 with Folate, CBC W/O DIFF, Iron, serum and TIBC, PTH intact, Vitamin D, and Zinc.            Prescriptions:       Ferrous Sulfate 325mg Tablets 1 TAB DAILY  #90 (Ninety) tablet(s) Refills: 30           Orders:       [id number]  B12FO - [facility] Vitamin B12 with Folate  (Send-Out)         93097  BDCB2 - [facility] CBC w/o diff  (Send-Out)         66158  IRONP - [facility] Iron and TIBC  (Send-Out)         84387  PTHIN - [facility] PTH, intact  (Send-Out)         29607  VITD - [facility] Vitamin D, 25 Hydroxy  (Send-Out)         56861  ZINCP - [facility] Zinc  (Send-Out)         09411   - [facility] Copper, serum  (Send-Out)         23685  VB1T - [facility] Thiamine (Vitamin B-1) blood  (Send-Out)            Osteoarthritis of hand(s) Bilateral hand pain with crocheting.  Will try some topical Voltaren.  If no relief, will need referral to hand surgeon for injections.           Prescriptions:       Voltaren  (Diclofenac Sodium) 1% Topical Gel Apply 4 g to affected area up to 4 times a day  #100 ([place]) gm Refills: 2             Patient Recommendations:        For  Depression responsive to treatment:     Schedule a follow-up visit  in 6 months.                APPOINTMENT INFORMATION:        Monday Tuesday Wednesday Thursday Friday Saturday Sunday            Time:___________________AM  PM   Date:_____________________         For  Patient with cardiac pacemaker:     I also recommend pacemaker status.              CHARGE CAPTURE           **Please note: ICD descriptions below are intended for billing purposes only and may not represent clinical diagnoses**        Primary Diagnosis:         296.25 Depression responsive to treatment            F34.1    Dysthymic disorder              Orders:          20505   Office/outpatient visit; established patient, level 4  (In-House)             APPTO   Appointment need  (In-House)           780.52 Insomnia            G47.00    Insomnia, unspecified    401.1 HTN            I10    Essential (primary) hypertension    272.0 High cholesterol            E78.0    Pure hypercholesterolemia    V45.01 Patient with cardiac pacemaker            Z95.0    Presence of cardiac pacemaker    244.8 Acquired hypothyroidism            E03.8    Other specified hypothyroidism    530.81 GERD            K21.9    Gastro-esophageal reflux disease without esophagitis    V45.86 Bariatric surgery status            Z98.84    Bariatric surgery status    715.14 Osteoarthritis of hand(s)            M19.041    Primary osteoarthritis, right hand           M19.042    Primary osteoarthritis, left hand

## 2021-05-18 NOTE — PROGRESS NOTES
Lucina Payan 1965     Office/Outpatient Visit    Visit Date: Tue, Sep 10, 2019 08:55 am    Provider: Brooke Clark MD (Assistant: Mickie Chandra MA)    Location: [facility]        Electronically signed by Brooke Clark MD on  09/10/2019 09:27:22 AM                             SUBJECTIVE:        CC:     Ms. Payan is a 53 year old White female.  Patient presents today for six month follow up (not taking Lisinopril or Allegra);         HPI: Lucina is here today for an annual physical, and also to follow up on chronic issues.        She thinks her last pap smear was about a year ago, with Dr. Wilkinson, and was normal.  Mammogram was last done 2/2019 at [facility] and was reportedly normal.  Her last colonoscopy was 5 years ago in [place] and is due around now.  Polyps were found at that time.  She is UTD on flu shot (2/2019).         She is on losartan and metoprolol succinate for HTN.  Switched from lisinopril d/t cough.  She is on torsemide with K+.  She follows with Dr. [facility].  She goes back to see him at the end of the month --- getting an echo at that time.  S/p pacemaker placemaker placement.  No CP, palpitations, SOB.  BP has been well controlled.          She is on pravastatin for HLD. No myalgias or other adverse effects.        She is on Synthroid for hypothyroidism.  No heat/cold intolerance, no changes in hair or skin.        She is on Zoloft 150 mg and Wellbutrin 300 mg for depression.  We increased that at her last visit and that has made a big difference for the better.  She is on trazodone 100 mg for insomnia -- sleeping well.  Has seen a therapist at [facility] in the past.           She is on omeprazole 40 mg for indigestion.   She is also on iron supplement.  She has h/o gastric bypass.        She is having a lot of postnasal drip.  She is still taking her Flonase.  She has tried Allegra and Mucinex in the past but not currently taking.      ROS:     CONSTITUTIONAL:   Negative for fatigue and fever.      EYES:  Negative for blurred vision.      E/N/T:  Positive for nasal congestion and frequent rhinorrhea.      CARDIOVASCULAR:  Negative for chest pain and palpitations.      RESPIRATORY:  Negative for recent cough and dyspnea.      GASTROINTESTINAL:  Negative for abdominal pain, constipation, diarrhea, nausea and vomiting.      MUSCULOSKELETAL:  Negative for arthralgias and myalgias.      PSYCHIATRIC:  Negative for anxiety, crying spells, depression, feelings of stress, anhedonia, mood swings, difficulty concentrating, sadness and sleep disturbance.          PMH/FMH/SH:     Last Reviewed on 9/10/2019 09:04 AM by Brooke Clark    Past Medical History:                 PAST MEDICAL HISTORY             GYNECOLOGICAL HISTORY:             CURRENT MEDICAL PROVIDERS:    Cardiologist: Dr. Denis    Obstetrician/Gynecologist         PREVENTIVE HEALTH MAINTENANCE             Hepatitis C Medicare Screening: was last done 10/2017         Surgical History:         Appendectomy    Cholecystectomy    Tonsillectomy/Adenoidectomy    Tubal Ligation       section: X 2;     GATRIC BYPASS;;;    PACEMAKER;;;     COLONOSCOPY: 2015-NEG;;         Family History:         Positive for Hypertension.      Positive for Breast Cancer and Colon Cancer.      Positive for Renal Stones.      Positive for Cerebrovascular Accident.      Positive for Type 1 Diabetes, Type 2 Diabetes and Hypothyroidism.          Social History:     Occupation:    Retired     Marital Status:          Tobacco/Alcohol/Supplements:     Last Reviewed on 9/10/2019 09:04 AM by Brooke Clark    Tobacco: Current Smoker: She currently smokes every day, 1-5 cigarettes per day.          Alcohol: Frequency:    RARE;     Caffeine:  She admits to consuming caffeine via -LITTLE.          Substance Abuse History:     Last Reviewed on 9/10/2019 09:04 AM by Brooke Clark    None         Mental Health History:     Last Reviewed on  9/10/2019 09:04 AM by Brooke Clark        Communicable Diseases (eg STDs):     Last Reviewed on 9/10/2019 09:04 AM by Brooke Clark            Current Problems:     Last Reviewed on 6/07/2019 04:03 PM by Ziggy Garcia    Osteoarthritis of hand(s)     Bariatric surgery status     GERD     Bradycardia     Sebaceous cyst     HTN     Acquired hypothyroidism     Allergies     Insomnia     Depression responsive to treatment     Thoracic aortic aneurysm, without rupture     High cholesterol     Patient with cardiac pacemaker         Immunizations:     Fluzone (3 + years dose) 10/1/2016     Fluzone (3 + years dose) 10/17/2017     Fluzone Quadrivalent (3+ years) 2/5/2019         Allergies:     Last Reviewed on 6/07/2019 04:03 PM by Ziggy Garcia    Demerol HCl:    Morphine Sulfate:        Current Medications:     Last Reviewed on 6/07/2019 04:03 PM by Ziggy Garcia    Synthroid 0.1mg Tablet 1 tab daily     Sertraline HCl 100mg Tablet 1 and 1/2 tablet daily.     Diphenhydramine HCl 50mg Tablet Take 1 tablet(s) by mouth at bedtime prn for sleep     Klor-Con M20 20mEq Tablets, Extended Release Take 1 tablet(s) by mouth bid     Lisinopril 40mg Tablet 1 tab daily     Metoprolol Succinate 50mg Tablets, Extended Release Take 1 tablet(s) by mouth daily     Pravastatin 20mg Tablet 1 tab q day hs     Torsemide 20mg Tablet Take 1 tablet(s) by mouth daily     Omeprazole 40mg Capsules, Extended Release 1 capsule daily     Wellbutrin XL 300mg Tablets, Extended Release 1 tab daily     Ferrous Sulfate 325mg Tablets 1 TAB DAILY     Losartan 50mg Tablet 1 tab daily     Ibuprofen 600mg Tablet 1 tab tid PRN     Allegra Allergy 24 Hour 180mg Tablet 1 tab daily     calcium 1200 plus D3 1000IU     Multivitamin/Mineral Supplement Tablet 1 tablet daily.     osteo bi flex         OBJECTIVE:        Vitals:         Current: 9/10/2019 9:00:15 AM    Ht:  5 ft, 6.5 in;  Wt: 195.8 lbs;  BMI: 31.1    T: 98.1 F (oral);  BP: 120/70 mm Hg (left  arm, sitting);  P: 84 bpm (left arm (BP Cuff), sitting);  sCr: 0.77 mg/dL;  GFR: 94.26        Exams:     PHYSICAL EXAM:     GENERAL: vital signs recorded - well developed, well nourished;  well groomed;  no apparent distress;     EYES: extraocular movements intact; conjunctiva and cornea are normal; PERRLA;     E/N/T: OROPHARYNX:  normal mucosa, dentition, gingiva, and posterior pharynx;     RESPIRATORY: normal respiratory rate and pattern with no distress; normal breath sounds with no rales, rhonchi, wheezes or rubs;     CARDIOVASCULAR: normal rate; rhythm is regular;  no systolic murmur; no edema;     GASTROINTESTINAL: nontender; normal bowel sounds;     MUSCULOSKELETAL: normal gait; normal overall tone     PSYCHIATRIC: appropriate affect and demeanor; normal psychomotor function; normal speech pattern; normal thought and perception;         ASSESSMENT           V70.0   Z00.00  Annual physical              DDx:     296.25   F34.1  Depression responsive to treatment              DDx:     401.1   I10  HTN              DDx:     272.0   E78.0  High cholesterol              DDx:     244.8   E03.8  Acquired hypothyroidism              DDx:     530.81   K21.9  GERD              DDx:     V79.0   Z13.31  Screening for depression              DDx:     V76.51   Z12.11  Screening for cancer of colon              DDx:         ORDERS:         Meds Prescribed:       Refill of: Wellbutrin XL (Bupropion HCl) 300mg Tablets, Extended Release 1 tab daily  #90 (Ninety) tablet(s) Refills: 1         Lab Orders:       [id number]  TSH - [facility] TSH  (Send-Out)         [id number]  HTNLP - H CMP AND LIPID: [id number], [id number]  (Send-Out)         APPTO  Appointment need  (In-House)           Procedures Ordered:       REFER  Referral to Specialist or Other Facility  (Send-Out)           Other Orders:         Depression screen negative  (In-House)                   PLAN:          Annual physical She thinks her last pap smear was about a year ago, with   Folmar, and was normal.  Requesting records.  Mamm[facility] was last done 2/2019 at [facility] and was reportedly normal; requesting records.  Her last colonoscopy was 5 years ago in [place] and is due around now.  Polyps were found at that time.  Repeat colonoscopy ordered.  She is UTD on flu shot (2/2019).  RTC 6 months for WWE (if indicated -- if not, will just do regular visit).          Depression responsive to treatment Doing great on increased dose of Wellbutrin in addition to the Zoloft.  Sleeping well on trazodone.  Mood is good.  no anxiety.  Refills sent.     [facility] PHQ-9 Depression Screening: Completed form scanned and in chart; Total Score 0; Negative Depression Screen   Smoking cessation encouraged. Counseling for less than 3 minutes.      FOLLOW-UP: Schedule a follow-up visit in 6 months..  well woman 30 min with Amber           Prescriptions:       Refill of: Wellbutrin XL (Bupropion HCl) 300mg Tablets, Extended Release 1 tab daily  #90 (Ninety) tablet(s) Refills: 1           Orders:       APPTO  Appointment need  (In-House)           Depression screen negative  (In-House)            HTN BP at goal.  No refills needed.  Checking labs.     LABORATORY:  Labs ordered to be performed today include HTN/Lipid Panel: CMP, Lipid.            Orders:       [id number]  HTNLP - [facility] CMP AND LIPID: [id number], [id number]  (Send-Out)            High cholesterol Stable.  No refills needed.  Checking labs.          Acquired hypothyroidism Stable.  No refills needed.  Checking labs.     LABORATORY:  Labs ordered to be performed today include TSH.            Orders:       [id number]  TSH - [facility] TSH  (Send-Out)            GERD Stable.  No refills needed.          Screening for cancer of colon         REFERRALS:  Referral initiated to a general surgeon ( Dr. Ke Camacho; a colonoscopy ).            Orders:       REFER  Referral to Specialist or Other Facility  (Send-Out)               Patient Recommendations:        For  Depression responsive  to treatment:     Schedule a follow-up visit in 6 months.                APPOINTMENT INFORMATION:        Monday Tuesday Wednesday Thursday Friday Saturday Sunday            Time:___________________AM  PM   Date:_____________________             CHARGE CAPTURE           **Please note: ICD descriptions below are intended for billing purposes only and may not represent clinical diagnoses**        Primary Diagnosis:         V70.0 Annual physical            Z00.00    Encounter for general adult medical examination without abnormal findings              Orders:          [id number]   Preventive medicine, established patient, age 40-64 years  (In-House)           296.25 Depression responsive to treatment            F34.1    Dysthymic disorder              Orders:          [id number] -25  Office/outpatient visit; established patient, level 3  (In-House)             APPTO   Appointment need  (In-House)                Depression screen negative  (In-House)           401.1 HTN            I10    Essential (primary) hypertension    272.0 High cholesterol            E78.0    Pure hypercholesterolemia    244.8 Acquired hypothyroidism            E03.8    Other specified hypothyroidism    530.81 GERD            K21.9    Gastro-esophageal reflux disease without esophagitis    V79.0 Screening for depression            Z13.31    Encounter for screening for depression    V76.51 Screening for cancer of colon            Z12.11    Encounter for screening for malignant neoplasm of colon

## 2021-06-05 NOTE — H&P
History and Physical      Patient Name: Lucina Payan   Patient ID: [id number]   Sex: Female   YOB: 1965    Primary Care Provider: Brooke Clark MD   Referring Provider: Brooke Clark MD    Visit Date: May 10, 2021    Provider: Galdino López DPM   Location: [facility]   Location Address: [address] [place] [address]  [id number]   Location Phone: [phone]          Chief Complaint  · Right Foot Pain  · Callus       History Of Present Illness  Lucina Payan presents to the office today for evaluation and treatment of      New, Established, New Problem:  new  Location:  hyperkeratotic lesion(s) on plantar 5th metatarsal head  Duration:  6 months  Onset:  gradual  Nature:  sore  Stable, worsening, improving:  worsening  Aggravating factors:  Patient reports lesion(s) is painful with shoegear and ambulation.    Previous Treatment:  none      Patient denies any fevers, chills, nausea, vomiting, shortness of breathe, nor any other constitutional signs nor symptoms.    Patient states that they work as a .       Past Medical History  Ascending aortic aneurysm; Corn/Callus; Heart Attack (MI); HTN (hypertension); Hyperlipidemia; Hypothyroidism; Ingrown toenail; Plantar warts         Past Surgical History  Adenoidectomy; Appendectomy; Ceasarian section; Cholecystectomy; Gastric Bypass; Pacemaker; Tonsillectomy         Medication List  diphenhydramine HCl 50 mg oral capsule; ferrous sulfate 325 mg (65 mg iron) oral tablet; ibuprofen 600 mg oral tablet; irbesartan 150 mg oral tablet; Klor-Con M20 20 mEq oral tablet,ER particles/crystals; metoprolol succinate 50 mg oral tablet extended release 24 hr; potassium chloride 20 mEq oral tablet extended release; pravastatin 20 mg oral tablet; sertraline 100 mg oral tablet; Synthroid 112 mcg oral tablet; torsemide 20 mg oral tablet; trazodone 100 mg oral tablet; Wellbutrin  mg oral tablet extended release 24 hr  "        Allergy List  Demerol; Morphine Sulfate       Allergies Reconciled  Family Medical History  Liver Neoplasm, Malignant; Stomach Neoplasm, Malignant; Stroke; Heart Disease; Colon Cancer; Lung cancer; Diabetes Mellitus, Type II; Diabetes; *No Known Family History         Social History  Alcohol (Never); Tobacco (Current every day)         Review of Systems  · Constitutional  o Denies  o : fatigue, night sweats  · Eyes  o Denies  o : double vision, blurred vision  · HENT  o Denies  o : vertigo, recent head injury  · Cardiovascular  o Denies  o : chest pain, irregular heart beats  · Respiratory  o Denies  o : shortness of breath, productive cough  · Gastrointestinal  o Denies  o : nausea, vomiting  · Genitourinary  o Denies  o : dysuria, urinary retention  · Integument  o * See HPI  · Neurologic  o Denies  o : altered mental status, seizures  · Musculoskeletal  o Denies  o : joint swelling, limitation of motion  · Endocrine  o Denies  o : cold intolerance, heat intolerance  · Heme-Lymph  o Denies  o : petechiae, lymph node enlargement or tenderness  · Allergic-Immunologic  o Denies  o : frequent illnesses      Vitals  Date Time BP Position Site L\R Cuff Size HR RR TEMP (F) WT  HT  BMI kg/m2 BSA m2 O2 Sat FR L/min FiO2 HC       05/10/2021 03:54 /73 Sitting    85 - R  97.9 181lbs 0oz 5'  7\" 28.35 1.97 98 %            Physical Examination  · Constitutional  o Appearance  o : well developed, well-nourished, no obvious deformities present  · Cardiovascular  o Peripheral Vascular System  o :   § Pedal Pulses  § : pulses 2 + and symmetrical  § Extremities  § : no edema in lower extremities  · Musculoskeletal  o General  o :   § General Musculoskeletal  § : Lower extremity muscle and strength and range of motion is equal and symmetrical bilaterally. The knees are noted to be normal in alignment. Ankle alignment and range of motion is notmral and foot structure is normal. Subtalar, metatarsal and " metatarsal-phalangeal range of motion is noted to be within normal limits. The digits of both feet are in normal alignment. The gait is normal.  · Skin and Subcutaneous Tissue  o General Inspection  o : DERM: focused hyperkeratotic lesion on plantar right metatarsal head. Partial thickness debridement with #15 scalpel blade down to health tissue. No signs of edema, erythema, lymphangitis, nor signs of infection.   o Digits and Nails  o : The toenails are noted to be without disease.  · Neurologic  o Sensation  o : Epicritic sensations intact bilaterally.          Assessment  · Foot pain, right     729.5/M79.671      Plan  · Orders  o Paring of single lesion (52998) - - 05/10/2021  · Medications  o Medications have been Reconciled  o Transition of Care or Provider Policy  · Instructions  o I have discussed the findings of this evaluation with the patient. The discussion included a complete verbal explanation of any changes in the examination results, diagnosis, and the current treatment plan. A schedule for future care needs was explained. If any questions should arise after returning home, I have encouraged the patient to feel free to contact Dr. López. The patient states understanding and agreement with this plan.   o Pt to monitor for problems and to contact Dr. López for follow-up should such signs occur. Patient states understanding and agreement with this plan.   o Patient request PRN follow-up.  o Discussed proper shoegear for the patient's feet and medical condition.  o Electronically Identified Patient Education Materials Provided Electronically  · Disposition  o Call or Return if symptoms worsen or persist.            Electronically Signed by: Galdino López DPM -Author on May 10, 2021 04:33:40 PM

## 2021-07-01 VITALS
DIASTOLIC BLOOD PRESSURE: 71 MMHG | HEIGHT: 67 IN | BODY MASS INDEX: 30.35 KG/M2 | WEIGHT: 193.4 LBS | SYSTOLIC BLOOD PRESSURE: 102 MMHG | HEART RATE: 70 BPM | TEMPERATURE: 97.9 F

## 2021-07-01 VITALS
TEMPERATURE: 97.5 F | DIASTOLIC BLOOD PRESSURE: 84 MMHG | HEART RATE: 62 BPM | BODY MASS INDEX: 35.22 KG/M2 | SYSTOLIC BLOOD PRESSURE: 126 MMHG | HEIGHT: 67 IN | WEIGHT: 224.4 LBS

## 2021-07-01 VITALS
HEART RATE: 73 BPM | TEMPERATURE: 98.2 F | SYSTOLIC BLOOD PRESSURE: 98 MMHG | WEIGHT: 203.8 LBS | BODY MASS INDEX: 31.99 KG/M2 | HEIGHT: 67 IN | DIASTOLIC BLOOD PRESSURE: 65 MMHG

## 2021-07-01 VITALS
SYSTOLIC BLOOD PRESSURE: 113 MMHG | HEART RATE: 83 BPM | DIASTOLIC BLOOD PRESSURE: 73 MMHG | BODY MASS INDEX: 34.15 KG/M2 | WEIGHT: 217.6 LBS | HEIGHT: 67 IN | TEMPERATURE: 98.2 F

## 2021-07-01 VITALS
DIASTOLIC BLOOD PRESSURE: 70 MMHG | HEIGHT: 67 IN | TEMPERATURE: 98.1 F | BODY MASS INDEX: 30.73 KG/M2 | HEART RATE: 84 BPM | SYSTOLIC BLOOD PRESSURE: 120 MMHG | WEIGHT: 195.8 LBS

## 2021-07-02 VITALS
TEMPERATURE: 97.9 F | HEIGHT: 67 IN | DIASTOLIC BLOOD PRESSURE: 66 MMHG | HEART RATE: 87 BPM | WEIGHT: 195.8 LBS | SYSTOLIC BLOOD PRESSURE: 91 MMHG | BODY MASS INDEX: 30.73 KG/M2

## 2021-07-02 VITALS
BODY MASS INDEX: 28.88 KG/M2 | HEIGHT: 67 IN | WEIGHT: 184 LBS | DIASTOLIC BLOOD PRESSURE: 55 MMHG | TEMPERATURE: 96.6 F | HEART RATE: 78 BPM | SYSTOLIC BLOOD PRESSURE: 108 MMHG

## 2021-07-02 VITALS
SYSTOLIC BLOOD PRESSURE: 154 MMHG | HEART RATE: 86 BPM | WEIGHT: 194 LBS | BODY MASS INDEX: 30.45 KG/M2 | TEMPERATURE: 98.3 F | DIASTOLIC BLOOD PRESSURE: 93 MMHG | HEIGHT: 67 IN

## 2021-07-02 VITALS
TEMPERATURE: 98.8 F | DIASTOLIC BLOOD PRESSURE: 87 MMHG | SYSTOLIC BLOOD PRESSURE: 117 MMHG | HEIGHT: 67 IN | HEART RATE: 89 BPM | WEIGHT: 196.4 LBS | BODY MASS INDEX: 30.83 KG/M2

## 2021-07-02 VITALS
DIASTOLIC BLOOD PRESSURE: 81 MMHG | HEART RATE: 93 BPM | TEMPERATURE: 97.1 F | SYSTOLIC BLOOD PRESSURE: 112 MMHG | BODY MASS INDEX: 28.69 KG/M2 | WEIGHT: 182.8 LBS | HEIGHT: 67 IN

## 2021-07-02 VITALS
DIASTOLIC BLOOD PRESSURE: 87 MMHG | SYSTOLIC BLOOD PRESSURE: 131 MMHG | TEMPERATURE: 96.3 F | HEIGHT: 67 IN | HEART RATE: 96 BPM | WEIGHT: 187.2 LBS | BODY MASS INDEX: 29.38 KG/M2

## 2021-07-02 RX ORDER — TRAZODONE HYDROCHLORIDE 100 MG/1
TABLET ORAL
Qty: 90 TABLET | Refills: 1 | Status: SHIPPED | OUTPATIENT
Start: 2021-07-02 | End: 2021-07-06

## 2021-07-02 RX ORDER — FAMOTIDINE 40 MG/1
TABLET, FILM COATED ORAL
Qty: 90 TABLET | Refills: 3 | Status: SHIPPED | OUTPATIENT
Start: 2021-07-02 | End: 2021-07-06

## 2021-07-02 RX ORDER — LEVOTHYROXINE SODIUM 112 UG/1
TABLET ORAL
Qty: 90 TABLET | Refills: 1 | Status: SHIPPED | OUTPATIENT
Start: 2021-07-02 | End: 2021-07-06

## 2021-07-02 RX ORDER — BUPROPION HYDROCHLORIDE 300 MG/1
TABLET ORAL
Qty: 90 TABLET | Refills: 1 | Status: SHIPPED | OUTPATIENT
Start: 2021-07-02 | End: 2021-07-06

## 2021-07-06 RX ORDER — POTASSIUM CHLORIDE 20 MEQ/1
20 TABLET, EXTENDED RELEASE ORAL DAILY
COMMUNITY
Start: 2021-03-24 | End: 2021-11-01 | Stop reason: SDUPTHER

## 2021-07-06 RX ORDER — METOPROLOL SUCCINATE 50 MG/1
50 TABLET, EXTENDED RELEASE ORAL DAILY
COMMUNITY
Start: 2021-03-24 | End: 2021-11-01 | Stop reason: SDUPTHER

## 2021-07-06 RX ORDER — FERROUS SULFATE 325(65) MG
325 TABLET ORAL DAILY
COMMUNITY
End: 2021-07-27

## 2021-07-06 RX ORDER — IRBESARTAN 150 MG/1
150 TABLET ORAL DAILY
COMMUNITY
Start: 2021-03-24 | End: 2021-11-01 | Stop reason: SDUPTHER

## 2021-07-06 RX ORDER — SERTRALINE HYDROCHLORIDE 100 MG/1
150 TABLET, FILM COATED ORAL DAILY
COMMUNITY
End: 2021-10-15 | Stop reason: SDUPTHER

## 2021-07-06 RX ORDER — TRAZODONE HYDROCHLORIDE 100 MG/1
TABLET ORAL
Qty: 90 TABLET | Refills: 1 | Status: SHIPPED | OUTPATIENT
Start: 2021-07-06 | End: 2021-10-15 | Stop reason: SDUPTHER

## 2021-07-06 RX ORDER — PRAVASTATIN SODIUM 20 MG
20 TABLET ORAL DAILY
COMMUNITY
Start: 2021-06-23 | End: 2021-11-01 | Stop reason: SDUPTHER

## 2021-07-06 RX ORDER — DIPHENHYDRAMINE HCL 50 MG
50 CAPSULE ORAL
COMMUNITY

## 2021-07-06 RX ORDER — OXYBUTYNIN CHLORIDE 5 MG/1
5 TABLET, EXTENDED RELEASE ORAL DAILY
COMMUNITY
Start: 2021-05-17 | End: 2021-07-21 | Stop reason: SDUPTHER

## 2021-07-06 RX ORDER — IBUPROFEN 600 MG/1
600 TABLET ORAL AS NEEDED
COMMUNITY

## 2021-07-06 RX ORDER — TORSEMIDE 20 MG/1
20 TABLET ORAL DAILY
COMMUNITY
Start: 2021-03-24 | End: 2021-12-23

## 2021-07-06 RX ORDER — BUPROPION HYDROCHLORIDE 300 MG/1
TABLET ORAL
Qty: 90 TABLET | Refills: 1 | Status: SHIPPED | OUTPATIENT
Start: 2021-07-06 | End: 2022-03-24

## 2021-07-06 RX ORDER — FAMOTIDINE 40 MG/1
TABLET, FILM COATED ORAL
Qty: 90 TABLET | Refills: 1 | Status: SHIPPED | OUTPATIENT
Start: 2021-07-06 | End: 2022-10-12

## 2021-07-06 RX ORDER — LEVOTHYROXINE SODIUM 112 UG/1
TABLET ORAL
Qty: 90 TABLET | Refills: 1 | Status: SHIPPED | OUTPATIENT
Start: 2021-07-06 | End: 2022-04-12 | Stop reason: SDUPTHER

## 2021-07-15 ENCOUNTER — TELEPHONE (OUTPATIENT)
Dept: FAMILY MEDICINE CLINIC | Age: 56
End: 2021-07-15

## 2021-07-15 VITALS
DIASTOLIC BLOOD PRESSURE: 73 MMHG | SYSTOLIC BLOOD PRESSURE: 137 MMHG | HEART RATE: 85 BPM | WEIGHT: 181 LBS | HEIGHT: 67 IN | TEMPERATURE: 97.9 F | BODY MASS INDEX: 28.41 KG/M2 | OXYGEN SATURATION: 98 %

## 2021-07-15 NOTE — TELEPHONE ENCOUNTER
Caller: Lucina Payan    Relationship: Self    Best call back number: [phone]    What medications are you currently taking:   Current Outpatient Medications on File Prior to Visit   Medication Sig Dispense Refill   • buPROPion XL (WELLBUTRIN XL) 300 MG 24 hr tablet TAKE ONE TABLET BY MOUTH DAILY 90 tablet 1   • diphenhydrAMINE (BENADRYL) 50 MG capsule Take 50 mg by mouth. Once nightly as needed     • famotidine (PEPCID) 40 MG tablet TAKE ONE TABLET BY MOUTH DAILY 90 tablet 1   • ferrous sulfate 325 (65 FE) MG tablet Take 325 mg by mouth Daily.     • ibuprofen (ADVIL,MOTRIN) 600 MG tablet Take 600 mg by mouth. One tablet tid prn     • irbesartan (AVAPRO) 150 MG tablet Take 150 mg by mouth Daily.     • levothyroxine (SYNTHROID, LEVOTHROID) 112 MCG tablet TAKE ONE TABLET BY MOUTH EVERY MORNING 90 tablet 1   • metoprolol succinate XL (TOPROL-XL) 50 MG 24 hr tablet Take 75 mg by mouth Daily.     • oxybutynin XL (DITROPAN-XL) 5 MG 24 hr tablet Take 5 mg by mouth Daily.     • potassium chloride (KLOR-CON) 20 MEQ CR tablet Take 20 mEq by mouth 2 (two) times a day.     • pravastatin (PRAVACHOL) 20 MG tablet Take 20 mg by mouth Daily.     • sertraline (ZOLOFT) 100 MG tablet Take 150 mg by mouth Daily.     • torsemide (DEMADEX) 20 MG tablet Take 20 mg by mouth Daily.     • traZODone (DESYREL) 100 MG tablet TAKE ONE TABLET BY MOUTH AT BEDTIME 90 tablet 1     No current facility-administered medications on file prior to visit.        When did you start taking these medications: SECOND PRESCRIPTION    Which medication are you concerned about: oxybutynin XL (DITROPAN-XL) 5 MG 24 hr tablet    Who prescribed you this medication: AMANDEEP BUCHANAN    What are your concerns: PATIENT WANTS TO KNOW IF SHE CAN INCREASE THE DOSAGE TO 10 MG, SHE STATED THAT THE MEDICATION IS NO LONGER WORKER.    How long have you been taking these medications: THIS IS HER SECOND REFILL    How long have you had these concerns: AT THE END OF THE FIRST  MONTH    PATIENT IS REQUESTING A CALLBACK.

## 2021-07-21 RX ORDER — OXYBUTYNIN CHLORIDE 10 MG/1
10 TABLET, EXTENDED RELEASE ORAL DAILY
Qty: 30 TABLET | Refills: 5 | Status: SHIPPED | OUTPATIENT
Start: 2021-07-21 | End: 2021-10-15 | Stop reason: SINTOL

## 2021-07-27 RX ORDER — FERROUS SULFATE 325(65) MG
TABLET ORAL
Qty: 90 TABLET | Refills: 0 | Status: SHIPPED | OUTPATIENT
Start: 2021-07-27 | End: 2023-01-13 | Stop reason: SDUPTHER

## 2021-09-13 ENCOUNTER — TRANSCRIBE ORDERS (OUTPATIENT)
Dept: ADMINISTRATIVE | Facility: HOSPITAL | Age: 56
End: 2021-09-13

## 2021-09-13 DIAGNOSIS — I71.20 THORACIC AORTIC ANEURYSM WITHOUT RUPTURE (HCC): Primary | ICD-10-CM

## 2021-09-22 ENCOUNTER — HOSPITAL ENCOUNTER (OUTPATIENT)
Dept: CT IMAGING | Facility: HOSPITAL | Age: 56
Discharge: HOME | End: 2021-09-22
Admitting: THORACIC SURGERY (CARDIOTHORACIC VASCULAR SURGERY)

## 2021-09-22 DIAGNOSIS — I71.20 THORACIC AORTIC ANEURYSM WITHOUT RUPTURE (HCC): ICD-10-CM

## 2021-09-22 PROCEDURE — 71250 CT THORAX DX C-: CPT

## 2021-10-15 ENCOUNTER — LAB (OUTPATIENT)
Dept: LAB | Facility: HOSPITAL | Age: 56
End: 2021-10-15

## 2021-10-15 ENCOUNTER — OFFICE VISIT (OUTPATIENT)
Dept: FAMILY MEDICINE CLINIC | Age: 56
End: 2021-10-15

## 2021-10-15 VITALS
SYSTOLIC BLOOD PRESSURE: 115 MMHG | WEIGHT: 179.6 LBS | HEIGHT: 67 IN | BODY MASS INDEX: 28.19 KG/M2 | HEART RATE: 80 BPM | DIASTOLIC BLOOD PRESSURE: 76 MMHG | TEMPERATURE: 98.7 F

## 2021-10-15 DIAGNOSIS — I10 PRIMARY HYPERTENSION: ICD-10-CM

## 2021-10-15 DIAGNOSIS — Z23 ENCOUNTER FOR IMMUNIZATION: ICD-10-CM

## 2021-10-15 DIAGNOSIS — E03.9 ACQUIRED HYPOTHYROIDISM: ICD-10-CM

## 2021-10-15 DIAGNOSIS — N39.41 URGE INCONTINENCE: ICD-10-CM

## 2021-10-15 DIAGNOSIS — F33.1 MODERATE EPISODE OF RECURRENT MAJOR DEPRESSIVE DISORDER (HCC): ICD-10-CM

## 2021-10-15 DIAGNOSIS — E78.2 MIXED HYPERLIPIDEMIA: ICD-10-CM

## 2021-10-15 DIAGNOSIS — I71.20 THORACIC AORTIC ANEURYSM WITHOUT RUPTURE (HCC): ICD-10-CM

## 2021-10-15 DIAGNOSIS — K21.9 GASTROESOPHAGEAL REFLUX DISEASE WITHOUT ESOPHAGITIS: ICD-10-CM

## 2021-10-15 DIAGNOSIS — I10 PRIMARY HYPERTENSION: Primary | ICD-10-CM

## 2021-10-15 PROBLEM — J30.9 ALLERGIC RHINITIS: Status: ACTIVE | Noted: 2021-10-15

## 2021-10-15 PROBLEM — F51.01 PRIMARY INSOMNIA: Status: ACTIVE | Noted: 2021-10-15

## 2021-10-15 PROBLEM — Z98.84 BARIATRIC SURGERY STATUS: Status: ACTIVE | Noted: 2021-10-15

## 2021-10-15 LAB
ALBUMIN SERPL-MCNC: 4.5 G/DL (ref 3.5–5.2)
ALBUMIN/GLOB SERPL: 1.9 G/DL
ALP SERPL-CCNC: 120 U/L (ref 39–117)
ALT SERPL W P-5'-P-CCNC: 20 U/L (ref 1–33)
ANION GAP SERPL CALCULATED.3IONS-SCNC: 10.7 MMOL/L (ref 5–15)
AST SERPL-CCNC: 30 U/L (ref 1–32)
BILIRUB SERPL-MCNC: 0.3 MG/DL (ref 0–1.2)
BUN SERPL-MCNC: 12 MG/DL (ref 6–20)
BUN/CREAT SERPL: 15.4 (ref 7–25)
CALCIUM SPEC-SCNC: 9.6 MG/DL (ref 8.6–10.5)
CHLORIDE SERPL-SCNC: 103 MMOL/L (ref 98–107)
CHOLEST SERPL-MCNC: 160 MG/DL (ref 0–200)
CO2 SERPL-SCNC: 31.3 MMOL/L (ref 22–29)
CREAT SERPL-MCNC: 0.78 MG/DL (ref 0.57–1)
GFR SERPL CREATININE-BSD FRML MDRD: 76 ML/MIN/1.73
GLOBULIN UR ELPH-MCNC: 2.4 GM/DL
GLUCOSE SERPL-MCNC: 86 MG/DL (ref 65–99)
HDLC SERPL-MCNC: 63 MG/DL (ref 40–60)
LDLC SERPL CALC-MCNC: 81 MG/DL (ref 0–100)
LDLC/HDLC SERPL: 1.28 {RATIO}
POTASSIUM SERPL-SCNC: 3.8 MMOL/L (ref 3.5–5.2)
PROT SERPL-MCNC: 6.9 G/DL (ref 6–8.5)
SODIUM SERPL-SCNC: 145 MMOL/L (ref 136–145)
TRIGL SERPL-MCNC: 83 MG/DL (ref 0–150)
TSH SERPL DL<=0.05 MIU/L-ACNC: 2.46 UIU/ML (ref 0.27–4.2)
VLDLC SERPL-MCNC: 16 MG/DL (ref 5–40)

## 2021-10-15 PROCEDURE — 84443 ASSAY THYROID STIM HORMONE: CPT

## 2021-10-15 PROCEDURE — 80053 COMPREHEN METABOLIC PANEL: CPT

## 2021-10-15 PROCEDURE — 90471 IMMUNIZATION ADMIN: CPT | Performed by: FAMILY MEDICINE

## 2021-10-15 PROCEDURE — 80061 LIPID PANEL: CPT

## 2021-10-15 PROCEDURE — 36415 COLL VENOUS BLD VENIPUNCTURE: CPT

## 2021-10-15 PROCEDURE — 99214 OFFICE O/P EST MOD 30 MIN: CPT | Performed by: FAMILY MEDICINE

## 2021-10-15 PROCEDURE — 90686 IIV4 VACC NO PRSV 0.5 ML IM: CPT | Performed by: FAMILY MEDICINE

## 2021-10-15 RX ORDER — TRAZODONE HYDROCHLORIDE 100 MG/1
100 TABLET ORAL
Qty: 90 TABLET | Refills: 1 | Status: SHIPPED | OUTPATIENT
Start: 2021-10-15 | End: 2022-05-11

## 2021-10-15 NOTE — ASSESSMENT & PLAN NOTE
Oxybutynin is working very well to control her urinary symptoms but unfortunately is causing her to have some pretty significant constipation.  We are going to stop the oxybutynin today and see if we can get her on Myrbetriq instead.  Continue to monitor.

## 2021-10-15 NOTE — ASSESSMENT & PLAN NOTE
Blood pressure is at goal.  Continue metoprolol succinate and irbesartan at current doses.  No changes.  No refills needed.  Checking labs.  Return to clinic in 6 months for annual physical.

## 2021-10-15 NOTE — PROGRESS NOTES
"Chief Complaint  Urinary Incontinence (follow up)    Subjective          Lucina Payan presents to [facility] today for routine f/u of chronic issues.    She wants to talk about her urinary urge incontinence.  We started oxybutynin and that is helping at the 10 mg dose.  Unfortunately, she is noticing some side effects of constipation and general \"slowing of the metabolism.\"      She is on losartan and metoprolol succinate for HTN.  She is on torsemide with potassium supplement.  She follows with Dr. Kuldeep Pena.  S/p pacemaker placemaker placement for bradycardia.  She denies CP, palpitations, SOB.  BP has been well controlled.  She is following with Dr. Benjamin (sp?) at [facility] to monitor her thoracic aortic aneurysm.  She is on pravastatin for hyperlipidemia. She denies myalgias.      She is on Synthroid for hypothyroidism.  She denies heat/cold intolerance, changes in hair or skin.      She is on sertraline 150 mg and bupropion 300 mg for depression.  She is on trazodone 100 mg for insomnia.  Sleeping well.  She has been to a therapist at [facility] in the past but is not seeing anyone currently.      She was on omeprazole for GERD, but a couple months ago, her rx ran out and she has not had any since.  She is doing \"okay.\"  Having some increased gas.   She is also on iron supplement.  She has h/o gastric bypass.      Current Outpatient Medications:   •  buPROPion XL (WELLBUTRIN XL) 300 MG 24 hr tablet, TAKE ONE TABLET BY MOUTH DAILY, Disp: 90 tablet, Rfl: 1  •  diphenhydrAMINE (BENADRYL) 50 MG capsule, Take 50 mg by mouth. Once nightly as needed, Disp: , Rfl:   •  famotidine (PEPCID) 40 MG tablet, TAKE ONE TABLET BY MOUTH DAILY, Disp: 90 tablet, Rfl: 1  •  FeroSul 325 (65 Fe) MG tablet, TAKE ONE TABLET BY MOUTH DAILY, Disp: 90 tablet, Rfl: 0  •  ibuprofen (ADVIL,MOTRIN) 600 MG tablet, Take 600 mg by mouth. One tablet tid prn, Disp: , Rfl:   •  irbesartan (AVAPRO) 150 MG tablet, Take " "150 mg by mouth Daily., Disp: , Rfl:   •  levothyroxine (SYNTHROID, LEVOTHROID) 112 MCG tablet, TAKE ONE TABLET BY MOUTH EVERY MORNING, Disp: 90 tablet, Rfl: 1  •  metoprolol succinate XL (TOPROL-XL) 50 MG 24 hr tablet, Take 75 mg by mouth Daily., Disp: , Rfl:   •  potassium chloride (KLOR-CON) 20 MEQ CR tablet, Take 20 mEq by mouth Daily., Disp: , Rfl:   •  pravastatin (PRAVACHOL) 20 MG tablet, Take 20 mg by mouth Daily., Disp: , Rfl:   •  sertraline (ZOLOFT) 50 MG tablet, Take 3 tablets by mouth Daily., Disp: 270 tablet, Rfl: 1  •  torsemide (DEMADEX) 20 MG tablet, Take 20 mg by mouth Daily., Disp: , Rfl:   •  traZODone (DESYREL) 100 MG tablet, Take 1 tablet by mouth every night at bedtime., Disp: 90 tablet, Rfl: 1  •  Mirabegron ER (Myrbetriq) 25 MG tablet sustained-release 24 hour 24 hr tablet, Take 1 tablet by mouth Daily., Disp: 30 tablet, Rfl: 5    Allergies:  Demerol [meperidine] and Morphine      Objective   Vital Signs:   /76 (BP Location: Right arm, Patient Position: Sitting, Cuff Size: Large Adult)   Pulse 80   Temp 98.7 °F (37.1 °C) (Oral)   Ht 170.2 cm (67.01\")   Wt 81.5 kg (179 lb 9.6 oz)   BMI 28.12 kg/m²     Physical Exam  Vitals reviewed.   Constitutional:       General: She is not in acute distress.     Appearance: Normal appearance. She is well-developed.   HENT:      Head: Normocephalic and atraumatic.      Right Ear: External ear normal.      Left Ear: External ear normal.      Nose: Nose normal.      Mouth/Throat:      Mouth: Mucous membranes are moist.   Eyes:      Extraocular Movements: Extraocular movements intact.      Conjunctiva/sclera: Conjunctivae normal.      Pupils: Pupils are equal, round, and reactive to light.   Cardiovascular:      Rate and Rhythm: Normal rate and regular rhythm.      Heart sounds: No murmur heard.      Pulmonary:      Effort: Pulmonary effort is normal.      Breath sounds: Normal breath sounds. No wheezing, rhonchi or rales.   Abdominal:      General: " Bowel sounds are normal. There is no distension.      Palpations: Abdomen is soft.      Tenderness: There is no abdominal tenderness.   Musculoskeletal:         General: Normal range of motion.   Neurological:      Mental Status: She is alert.   Psychiatric:         Mood and Affect: Affect normal.             Assessment and Plan    Diagnoses and all orders for this visit:    1. Primary hypertension (Primary)  Assessment & Plan:  Blood pressure is at goal.  Continue metoprolol succinate and irbesartan at current doses.  No changes.  No refills needed.  Checking labs.  Return to clinic in 6 months for annual physical.    Orders:  -     Lipid Panel; Future  -     Comprehensive Metabolic Panel; Future    2. Urge incontinence  Assessment & Plan:  Oxybutynin is working very well to control her urinary symptoms but unfortunately is causing her to have some pretty significant constipation.  We are going to stop the oxybutynin today and see if we can get her on Myrbetriq instead.  Continue to monitor.    Orders:  -     Mirabegron ER (Myrbetriq) 25 MG tablet sustained-release 24 hour 24 hr tablet; Take 1 tablet by mouth Daily.  Dispense: 30 tablet; Refill: 5    3. Mixed hyperlipidemia  Assessment & Plan:  Stable on pravastatin.  No refills needed.  Checking labs.      4. Acquired hypothyroidism  Assessment & Plan:  Stable.  No refills needed.  Checking labs.    Orders:  -     TSH; Future    5. Moderate episode of recurrent major depressive disorder (HCC)  Assessment & Plan:  Stable.  Refill sent.    Orders:  -     traZODone (DESYREL) 100 MG tablet; Take 1 tablet by mouth every night at bedtime.  Dispense: 90 tablet; Refill: 1  -     sertraline (ZOLOFT) 50 MG tablet; Take 3 tablets by mouth Daily.  Dispense: 270 tablet; Refill: 1    6. Gastroesophageal reflux disease without esophagitis  Assessment & Plan:  Stable.  No refills needed.      7. Thoracic aortic aneurysm without rupture (HCC)  Assessment & Plan:  Following with CT  surgery.  Her aneurysm has been growing a bit so they are doing scans every 6 months at this time.      8. Encounter for immunization  -     FluLaval/Fluarix/Fluzone >6 Months      Follow Up   Return in about 6 months (around 4/15/2022) for Annual physical.  Patient was given instructions and counseling regarding her condition or for health maintenance advice. Please see specific information pulled into the AVS if appropriate.

## 2021-10-15 NOTE — ASSESSMENT & PLAN NOTE
Following with CT surgery.  Her aneurysm has been growing a bit so they are doing scans every 6 months at this time.

## 2021-10-25 DIAGNOSIS — F33.1 MODERATE EPISODE OF RECURRENT MAJOR DEPRESSIVE DISORDER (HCC): ICD-10-CM

## 2021-11-01 ENCOUNTER — OFFICE VISIT (OUTPATIENT)
Dept: CARDIOLOGY | Facility: CLINIC | Age: 56
End: 2021-11-01

## 2021-11-01 ENCOUNTER — CLINICAL SUPPORT NO REQUIREMENTS (OUTPATIENT)
Dept: CARDIOLOGY | Facility: CLINIC | Age: 56
End: 2021-11-01

## 2021-11-01 VITALS
DIASTOLIC BLOOD PRESSURE: 86 MMHG | HEIGHT: 67 IN | HEART RATE: 64 BPM | SYSTOLIC BLOOD PRESSURE: 130 MMHG | BODY MASS INDEX: 28.09 KG/M2 | WEIGHT: 179 LBS

## 2021-11-01 DIAGNOSIS — I10 ESSENTIAL HYPERTENSION: ICD-10-CM

## 2021-11-01 DIAGNOSIS — Z95.0 PRESENCE OF CARDIAC PACEMAKER: Primary | ICD-10-CM

## 2021-11-01 DIAGNOSIS — I71.20 THORACIC AORTIC ANEURYSM WITHOUT RUPTURE (HCC): ICD-10-CM

## 2021-11-01 DIAGNOSIS — I49.5 SSS (SICK SINUS SYNDROME) (HCC): Primary | ICD-10-CM

## 2021-11-01 DIAGNOSIS — Z95.0 CARDIAC PACEMAKER: ICD-10-CM

## 2021-11-01 DIAGNOSIS — E78.2 MIXED HYPERLIPIDEMIA: ICD-10-CM

## 2021-11-01 PROCEDURE — 93280 PM DEVICE PROGR EVAL DUAL: CPT | Performed by: INTERNAL MEDICINE

## 2021-11-01 PROCEDURE — 99214 OFFICE O/P EST MOD 30 MIN: CPT | Performed by: INTERNAL MEDICINE

## 2021-11-01 RX ORDER — PRAVASTATIN SODIUM 20 MG
20 TABLET ORAL DAILY
Qty: 90 TABLET | Refills: 3 | Status: SHIPPED | OUTPATIENT
Start: 2021-11-01 | End: 2022-11-10

## 2021-11-01 RX ORDER — METOPROLOL SUCCINATE 50 MG/1
50 TABLET, EXTENDED RELEASE ORAL DAILY
Qty: 90 TABLET | Refills: 3 | Status: SHIPPED | OUTPATIENT
Start: 2021-11-01 | End: 2022-08-02 | Stop reason: SDUPTHER

## 2021-11-01 RX ORDER — MULTIPLE VITAMINS W/ MINERALS TAB 9MG-400MCG
1 TAB ORAL DAILY
COMMUNITY

## 2021-11-01 RX ORDER — POTASSIUM CHLORIDE 20 MEQ/1
20 TABLET, EXTENDED RELEASE ORAL DAILY
Qty: 90 TABLET | Refills: 3 | Status: SHIPPED | OUTPATIENT
Start: 2021-11-01 | End: 2021-11-16 | Stop reason: SDUPTHER

## 2021-11-01 RX ORDER — IRBESARTAN 150 MG/1
150 TABLET ORAL DAILY
Qty: 90 TABLET | Refills: 3 | Status: SHIPPED | OUTPATIENT
Start: 2021-11-01 | End: 2022-12-01

## 2021-11-01 NOTE — ASSESSMENT & PLAN NOTE
Implanted in 2013 for symptomatic bradycardia and sick sinus syndrome.  Pacemaker interrogated in the office today.  Battery longevity is 23 months.  Lead parameters are appropriate.  89% atrial pacing.  There were no major events.  No programming changes were made.  We will continue with home remote monitoring.

## 2021-11-01 NOTE — PROGRESS NOTES
Normal Dual Chamber Pacemaker device interrogation.  Normal evaluation of device function and lead measurements.  No optimization was needed of parameters or maximization of device longevity.  Battery Voltage is 2.74 and she is not dependant, approximately 24 months on battery.  She does manual downloads and will send her next one in.

## 2021-11-01 NOTE — ASSESSMENT & PLAN NOTE
Recent CT scan showed a size of 4.9 cm, she followed up with cardiothoracic surgery since then.  She has another CT scan scheduled in 6 months.

## 2021-11-01 NOTE — PROGRESS NOTES
CARDIOLOGY FOLLOW-UP PROGRESS NOTE        Chief Complaint  Follow-up, Hypertension, Hyperlipidemia, and Pacemaker Problem    Subjective            Lucina Paayn presents to [facility]  History of Present Illness    This is a 56-year-old female with a ascending aortic aneurysm, hypertension, hyperlipidemia, status post permanent pacemaker, who is here for 6-month follow-up visit and pacemaker evaluation.  She denies having any major symptoms at this time.  Chest pain episodes have completely subsided.  Denies any dizziness or syncopal episodes.  She is taking all the medications as prescribed.         Past History:     1) Status post permanent pacemaker placement for bradycardia in ; 2) Ascending aortic aneurysm; 3) Hypertension; 4) Hyperlipidemia.    Medical History:  Past Medical History:   Diagnosis Date   • Cardiac pacemaker    • Depression    • GERD (gastroesophageal reflux disease)    • Hyperlipidemia    • Hypertension    • Hypothyroidism    • Thoracic aortic aneurysm (HCC)        Surgical History: has a past surgical history that includes Appendectomy; Cholecystectomy; tonsillectomy and adenoidectomy; postpartum tubal ligation;  section; Gastric bypass; and Cardiac pacemaker placement.     Family History: family history includes Hypertension in her father and mother; Liver cancer in her mother.     Social History: reports that she has been smoking. She has never used smokeless tobacco. She reports previous alcohol use. She reports that she does not use drugs.    Allergies: Demerol [meperidine], Morphine, and Silver    Current Outpatient Medications on File Prior to Visit   Medication Sig   • buPROPion XL (WELLBUTRIN XL) 300 MG 24 hr tablet TAKE ONE TABLET BY MOUTH DAILY   • diphenhydrAMINE (BENADRYL) 50 MG capsule Take 50 mg by mouth. Once nightly as needed   • famotidine (PEPCID) 40 MG tablet TAKE ONE TABLET BY MOUTH DAILY   • ferrous sulfate (FeroSul) 325 (65 FE) MG  "tablet TAKE ONE TABLET BY MOUTH DAILY   • ibuprofen (ADVIL,MOTRIN) 600 MG tablet Take 600 mg by mouth As Needed. One tablet tid prn   • levothyroxine (SYNTHROID, LEVOTHROID) 112 MCG tablet TAKE ONE TABLET BY MOUTH EVERY MORNING   • multivitamin with minerals (MULTIVITAMIN ADULTS PO) Take 1 tablet by mouth Daily.   • sertraline (ZOLOFT) 50 MG tablet Take 3 tablets by mouth Daily.   • torsemide (DEMADEX) 20 MG tablet Take 20 mg by mouth Daily.   • traZODone (DESYREL) 100 MG tablet Take 1 tablet by mouth every night at bedtime.   •  irbesartan (AVAPRO) 150 MG tablet Take 150 mg by mouth Daily.   •  metoprolol succinate XL (TOPROL-XL) 50 MG 24 hr tablet Take 50 mg by mouth Daily.   • [ potassium chloride (KLOR-CON) 20 MEQ CR tablet Take 20 mEq by mouth Daily.   • pravastatin (PRAVACHOL) 20 MG tablet Take 20 mg by mouth Daily.   • Mirabegron ER (Myrbetriq) 25 MG tablet sustained-release 24 hour 24 hr tablet Take 1 tablet by mouth Daily.                       • [potassium chloride (KLOR-CON) 20 MEQ CR tablet Take 1 tablet by mouth Daily.   •  traZODone (DESYREL) 100 MG tablet Take 1 tablet by mouth every night at bedtime.     No current facility-administered medications on file prior to visit.          Review of Systems   Respiratory: Negative for cough, shortness of breath and wheezing.    Cardiovascular: Negative for chest pain, palpitations and leg swelling.   Gastrointestinal: Negative for nausea and vomiting.   Neurological: Negative for dizziness and syncope.        Objective     /86   Pulse 64   Ht 170.2 cm (67\")   Wt 81.2 kg (179 lb)   BMI 28.04 kg/m²       Physical Exam    General : Alert, awake, no acute distress  Neck : Supple, no carotid bruit, no jugular venous distention  CVS : Regular rate and rhythm, no murmur, rubs or gallops  Lungs: Clear to auscultation bilaterally, no crackles or rhonchi  Abdomen: Soft, nontender, bowel sounds heard in all 4 quadrants  Extremities: Warm, well-perfused, no " pedal edema    Result Review :     The following data was reviewed by: Yayo Light MD on 11/01/2021:    CMP    CMP 1/8/21 3/18/21 10/15/21   Glucose 91 106 (A) 86   BUN 15 15 12   Creatinine 0.87 0.87 0.78   eGFR Non African Am   76   Sodium 143 143 145   Potassium 4.2 4.9 3.8   Chloride 104 103 103   Calcium 9.7 10.0 9.6   Albumin 4.3 4.2 4.50   Total Bilirubin 0.37 0.31 0.3   Alkaline Phosphatase 127 135 120 (A)   AST (SGOT) 35 34 30   ALT (SGPT) 28 24 20   (A) Abnormal value       Comments are available for some flowsheets but are not being displayed.           CBC    CBC 1/8/21 1/8/21 4/16/21    1427 1427    WBC 5.93 5.72    RBC 4.65 4.68 NONE SEEN   Hemoglobin 14.90     Hematocrit 44.3     MCV 95.3     MCH 32.0 (A)     MCHC 33.6     RDW 12.6     Platelets 258.00     (A) Abnormal value            TSH    TSH 1/8/21 10/15/21   TSH 1.380 2.460           Lipid Panel    Lipid Panel 1/8/21 3/18/21 10/15/21   Total Cholesterol   160   Total Cholesterol 184 161    Triglycerides 101 102 83   HDL Cholesterol 67 (A) 64 (A) 63 (A)   VLDL Cholesterol 20 20 16   LDL Cholesterol  97 77 81   LDL/HDL Ratio   1.28   (A) Abnormal value       Comments are available for some flowsheets but are not being displayed.                         Assessment and Plan        Diagnoses and all orders for this visit:    1. Presence of cardiac pacemaker (Primary)  Assessment & Plan:  Implanted in 2013 for symptomatic bradycardia and sick sinus syndrome.  Pacemaker interrogated in the office today.  Battery longevity is 23 months.  Lead parameters are appropriate.  89% atrial pacing.  There were no major events.  No programming changes were made.  We will continue with home remote monitoring.      2. Thoracic aortic aneurysm without rupture (HCC)  Assessment & Plan:  Recent CT scan showed a size of 4.9 cm, she followed up with cardiothoracic surgery since then.  She has another CT scan scheduled in 6 months.    Orders:  -     irbesartan  (AVAPRO) 150 MG tablet; Take 1 tablet by mouth Daily.  Dispense: 90 tablet; Refill: 3    3. Essential hypertension  Assessment & Plan:  Diastolic blood pressure on the higher side in the office today.  Previously well controlled.  Recommend to keep a home blood pressure log and for any persisting elevated blood pressure, medications needs to be adjusted.  Continue metoprolol succinate and irbesartan at the current dose.    Orders:  -     metoprolol succinate XL (TOPROL-XL) 50 MG 24 hr tablet; Take 1 tablet by mouth Daily.  Dispense: 90 tablet; Refill: 3  -     irbesartan (AVAPRO) 150 MG tablet; Take 1 tablet by mouth Daily.  Dispense: 90 tablet; Refill: 3    4. Mixed hyperlipidemia  Assessment & Plan:  LDL less than 100, at goal.  Continue pravastatin.    Orders:  -     pravastatin (PRAVACHOL) 20 MG tablet; Take 1 tablet by mouth Daily.  Dispense: 90 tablet; Refill: 3    Other orders  -     potassium chloride (KLOR-CON) 20 MEQ CR tablet; Take 1 tablet by mouth Daily.  Dispense: 90 tablet; Refill: 3            Follow Up     Return in about 9 months (around 8/1/2022) for Next scheduled follow up at [facility] .    Patient was given instructions and counseling regarding her condition or for health maintenance advice. Please see specific information pulled into the AVS if appropriate.

## 2021-11-01 NOTE — ASSESSMENT & PLAN NOTE
Diastolic blood pressure on the higher side in the office today.  Previously well controlled.  Recommend to keep a home blood pressure log and for any persisting elevated blood pressure, medications needs to be adjusted.  Continue metoprolol succinate and irbesartan at the current dose.

## 2021-11-16 ENCOUNTER — TELEPHONE (OUTPATIENT)
Dept: FAMILY MEDICINE CLINIC | Age: 56
End: 2021-11-16

## 2021-11-16 RX ORDER — POTASSIUM CHLORIDE 20 MEQ/1
20 TABLET, EXTENDED RELEASE ORAL DAILY
Qty: 90 TABLET | Refills: 1 | Status: SHIPPED | OUTPATIENT
Start: 2021-11-16 | End: 2022-04-13 | Stop reason: SDUPTHER

## 2021-11-16 NOTE — TELEPHONE ENCOUNTER
Caller: Schuyler Lucina    Relationship: Self    Best call back number: [phone]    Requested Prescriptions:   Requested Prescriptions     Pending Prescriptions Disp Refills   • potassium chloride (KLOR-CON) 20 MEQ CR tablet 90 tablet 3     Sig: Take 1 tablet by mouth Daily.        Pharmacy where request should be sent: [facility] [address] [place] [address] LEI REYES [facility] [phone]  - [phone] FX     Additional details provided by patient:PATIENT CANNOT SWALLOW THE ONES THAT ARE NOT COATED.  PLEASE CALL IN THE COATED MEDICATION SCRIPT TO [facility].    Does the patient have less than a 3 day supply:  [x] Yes  [] No    Phillip Perez Rep   11/16/21 12:58 EST

## 2021-12-23 RX ORDER — TORSEMIDE 20 MG/1
TABLET ORAL
Qty: 90 TABLET | Refills: 2 | Status: SHIPPED | OUTPATIENT
Start: 2021-12-23 | End: 2022-12-15

## 2022-01-14 RX ORDER — POTASSIUM CHLORIDE 1500 MG/1
TABLET, FILM COATED, EXTENDED RELEASE ORAL
Qty: 90 TABLET | Refills: 3 | Status: SHIPPED | OUTPATIENT
Start: 2022-01-14 | End: 2022-03-24 | Stop reason: SDUPTHER

## 2022-03-09 ENCOUNTER — TELEPHONE (OUTPATIENT)
Dept: CARDIOLOGY | Facility: CLINIC | Age: 57
End: 2022-03-09

## 2022-03-24 ENCOUNTER — HOSPITAL ENCOUNTER (OUTPATIENT)
Dept: GENERAL RADIOLOGY | Facility: HOSPITAL | Age: 57
Discharge: HOME | End: 2022-03-24

## 2022-03-24 ENCOUNTER — TELEPHONE (OUTPATIENT)
Dept: FAMILY MEDICINE CLINIC | Age: 57
End: 2022-03-24

## 2022-03-24 ENCOUNTER — OFFICE VISIT (OUTPATIENT)
Dept: FAMILY MEDICINE CLINIC | Age: 57
End: 2022-03-24

## 2022-03-24 VITALS
BODY MASS INDEX: 29.98 KG/M2 | WEIGHT: 191 LBS | HEIGHT: 67 IN | SYSTOLIC BLOOD PRESSURE: 104 MMHG | DIASTOLIC BLOOD PRESSURE: 72 MMHG | HEART RATE: 87 BPM

## 2022-03-24 DIAGNOSIS — M19.041 PRIMARY OSTEOARTHRITIS OF HANDS, BILATERAL: Primary | ICD-10-CM

## 2022-03-24 DIAGNOSIS — M19.041 PRIMARY OSTEOARTHRITIS OF HANDS, BILATERAL: ICD-10-CM

## 2022-03-24 DIAGNOSIS — M19.042 PRIMARY OSTEOARTHRITIS OF HANDS, BILATERAL: Primary | ICD-10-CM

## 2022-03-24 DIAGNOSIS — M19.042 PRIMARY OSTEOARTHRITIS OF HANDS, BILATERAL: ICD-10-CM

## 2022-03-24 PROCEDURE — 73120 X-RAY EXAM OF HAND: CPT

## 2022-03-24 PROCEDURE — 99213 OFFICE O/P EST LOW 20 MIN: CPT | Performed by: FAMILY MEDICINE

## 2022-03-24 RX ORDER — METHYLPREDNISOLONE 4 MG/1
TABLET ORAL
Qty: 1 EACH | Refills: 0 | Status: SHIPPED | OUTPATIENT
Start: 2022-03-24 | End: 2022-04-15

## 2022-03-24 NOTE — TELEPHONE ENCOUNTER
Caller: Lucina Payan    Relationship: Self    Best call back number: [phone]    What was the call regarding: PATIENT IS CALLING ABOUT INJECTIONS FOR ARTHERITIS PAIN. SHE STATES SHE CANT STAND THE PAIN ANYMORE AND WOULD LIKE TO SEE SOMEONE ASAP;      Do you require a callback: YES, PLEASE CALL BACK AND ADVISE

## 2022-03-24 NOTE — PROGRESS NOTES
Chief Complaint  Hand Pain (Long term (arthritis))    Subjective          Lucina Payan presents to [facility] today for bilateral hand pain, worst at the thumb joints.  It has been steadily worsening for the past months.  She has been trying ice, Tylenol extra strength, ibuprofen 600 mg, an accupressure-heated hand massager, topical diclofenac gel, all without significant relief.  She does a lot of work with her hands including typing and folding constantly throughout the day.  The pain has gotten worse and worse to the point where she can hardly stand to do anything.  Her  strength is reduced due to pain.      Current Outpatient Medications:   •  diphenhydrAMINE (BENADRYL) 50 MG capsule, Take 50 mg by mouth. Once nightly as needed, Disp: , Rfl:   •  famotidine (PEPCID) 40 MG tablet, TAKE ONE TABLET BY MOUTH DAILY, Disp: 90 tablet, Rfl: 1  •  ferrous sulfate (FeroSul) 325 (65 FE) MG tablet, TAKE ONE TABLET BY MOUTH DAILY (Patient taking differently: Every other day), Disp: 90 tablet, Rfl: 0  •  ibuprofen (ADVIL,MOTRIN) 600 MG tablet, Take 600 mg by mouth As Needed. One tablet tid prn, Disp: , Rfl:   •  irbesartan (AVAPRO) 150 MG tablet, Take 1 tablet by mouth Daily., Disp: 90 tablet, Rfl: 3  •  levothyroxine (SYNTHROID, LEVOTHROID) 112 MCG tablet, TAKE ONE TABLET BY MOUTH EVERY MORNING, Disp: 90 tablet, Rfl: 1  •  metoprolol succinate XL (TOPROL-XL) 50 MG 24 hr tablet, Take 1 tablet by mouth Daily., Disp: 90 tablet, Rfl: 3  •  multivitamin with minerals tablet tablet, Take 1 tablet by mouth Daily., Disp: , Rfl:   •  potassium chloride (KLOR-CON) 20 MEQ CR tablet, Take 1 tablet by mouth Daily., Disp: 90 tablet, Rfl: 1  •  pravastatin (PRAVACHOL) 20 MG tablet, Take 1 tablet by mouth Daily., Disp: 90 tablet, Rfl: 3  •  sertraline (ZOLOFT) 50 MG tablet, Take 3 tablets by mouth Daily., Disp: 270 tablet, Rfl: 1  •  torsemide (DEMADEX) 20 MG tablet, TAKE ONE TABLET BY MOUTH DAILY, Disp:  "90 tablet, Rfl: 2  •  traZODone (DESYREL) 100 MG tablet, Take 1 tablet by mouth every night at bedtime., Disp: 90 tablet, Rfl: 1  •  methylPREDNISolone (Medrol) 4 MG dose pack, Take as directed on package instructions., Disp: 1 each, Rfl: 0    Allergies:  Demerol [meperidine], Morphine, and Silver      Objective   Vital Signs:   Vitals:    03/24/22 1550   BP: 104/72   BP Location: Left arm   Patient Position: Sitting   Pulse: 87   Weight: 86.6 kg (191 lb)   Height: 170.2 cm (67\")       Physical Exam  Constitutional:       Appearance: Normal appearance.   HENT:      Head: Normocephalic and atraumatic.   Eyes:      Extraocular Movements: Extraocular movements intact.      Conjunctiva/sclera: Conjunctivae normal.   Pulmonary:      Effort: Pulmonary effort is normal. No respiratory distress.   Musculoskeletal:         General: Normal range of motion.   Skin:     General: Skin is warm and dry.   Neurological:      General: No focal deficit present.      Mental Status: She is alert and oriented to person, place, and time.   Psychiatric:         Mood and Affect: Mood normal.         Behavior: Behavior normal.         Thought Content: Thought content normal.         Judgment: Judgment normal.             Assessment and Plan    Diagnoses and all orders for this visit:    1. Primary osteoarthritis of hands, bilateral (Primary)  Assessment & Plan:  She is having very severe pain in the bilateral hands, worse to the thumb joints.  She has been trying conservative measures including over-the-counter medications which gracy her mild temporary relief, but the pain always comes back.  She is interested in getting injections in the thumb joints, which her mom used to get.  Her mom also had very severe hand arthritis.  Unfortunately, I am not able to do injections for her today.  I will go ahead and send her in a Medrol Dosepak so that she can try that and see if it gives her any relief.  I will also get her a referral going to " Orthopedics with Dr. Dillon Navarro upstairs.  In the meantime, I will also get x-rays of the bilateral hands to evaluate the extent of her probable arthritis.    Orders:  -     XR Hand 2 View Left; Future  -     XR Hand 2 View Right; Future  -     methylPREDNISolone (Medrol) 4 MG dose pack; Take as directed on package instructions.  Dispense: 1 each; Refill: 0  -     Ambulatory Referral to Orthopedic Surgery      Follow Up   Return if symptoms worsen or fail to improve, for Or as scheduled 4/15/2022.  Patient was given instructions and counseling regarding her condition or for health maintenance advice. Please see specific information pulled into the AVS if appropriate.

## 2022-03-24 NOTE — ASSESSMENT & PLAN NOTE
She is having very severe pain in the bilateral hands, worse to the thumb joints.  She has been trying conservative measures including over-the-counter medications which gracy her mild temporary relief, but the pain always comes back.  She is interested in getting injections in the thumb joints, which her mom used to get.  Her mom also had very severe hand arthritis.  Unfortunately, I am not able to do injections for her today.  I will go ahead and send her in a Medrol Dosepak so that she can try that and see if it gives her any relief.  I will also get her a referral going to Orthopedics with Dr. Dillon Navarro upstairs.  In the meantime, I will also get x-rays of the bilateral hands to evaluate the extent of her probable arthritis.

## 2022-03-24 NOTE — TELEPHONE ENCOUNTER
I do not think we have discussed her arthritis pain, or at least not recently, so I would need to see Lucina in clinic so we can talk about what is going on with her in order to formulate the best treatment plan for her.  Thanks, MARSHAL

## 2022-04-01 ENCOUNTER — TRANSCRIBE ORDERS (OUTPATIENT)
Dept: ADMINISTRATIVE | Facility: HOSPITAL | Age: 57
End: 2022-04-01

## 2022-04-01 DIAGNOSIS — I71.21 ASCENDING AORTIC ANEURYSM: Primary | ICD-10-CM

## 2022-04-12 RX ORDER — LEVOTHYROXINE SODIUM 112 UG/1
TABLET ORAL
Qty: 90 TABLET | Refills: 1 | Status: SHIPPED | OUTPATIENT
Start: 2022-04-12 | End: 2022-07-28

## 2022-04-13 RX ORDER — POTASSIUM CHLORIDE 20 MEQ/1
20 TABLET, EXTENDED RELEASE ORAL DAILY
Qty: 90 TABLET | Refills: 2 | Status: SHIPPED | OUTPATIENT
Start: 2022-04-13 | End: 2022-11-23

## 2022-04-15 ENCOUNTER — OFFICE VISIT (OUTPATIENT)
Dept: FAMILY MEDICINE CLINIC | Age: 57
End: 2022-04-15

## 2022-04-15 ENCOUNTER — LAB (OUTPATIENT)
Dept: LAB | Facility: HOSPITAL | Age: 57
End: 2022-04-15

## 2022-04-15 VITALS
SYSTOLIC BLOOD PRESSURE: 105 MMHG | BODY MASS INDEX: 31.23 KG/M2 | DIASTOLIC BLOOD PRESSURE: 83 MMHG | HEART RATE: 97 BPM | WEIGHT: 199 LBS | HEIGHT: 67 IN

## 2022-04-15 DIAGNOSIS — M19.042 PRIMARY OSTEOARTHRITIS OF HANDS, BILATERAL: ICD-10-CM

## 2022-04-15 DIAGNOSIS — I10 ESSENTIAL HYPERTENSION: ICD-10-CM

## 2022-04-15 DIAGNOSIS — E03.9 ACQUIRED HYPOTHYROIDISM: ICD-10-CM

## 2022-04-15 DIAGNOSIS — F33.1 MODERATE EPISODE OF RECURRENT MAJOR DEPRESSIVE DISORDER: Primary | ICD-10-CM

## 2022-04-15 DIAGNOSIS — M19.041 PRIMARY OSTEOARTHRITIS OF HANDS, BILATERAL: ICD-10-CM

## 2022-04-15 DIAGNOSIS — E78.2 MIXED HYPERLIPIDEMIA: ICD-10-CM

## 2022-04-15 LAB
ALBUMIN SERPL-MCNC: 4.4 G/DL (ref 3.5–5.2)
ALBUMIN/GLOB SERPL: 1.8 G/DL
ALP SERPL-CCNC: 116 U/L (ref 39–117)
ALT SERPL W P-5'-P-CCNC: 20 U/L (ref 1–33)
ANION GAP SERPL CALCULATED.3IONS-SCNC: 9 MMOL/L (ref 5–15)
AST SERPL-CCNC: 30 U/L (ref 1–32)
BILIRUB SERPL-MCNC: 0.3 MG/DL (ref 0–1.2)
BUN SERPL-MCNC: 15 MG/DL (ref 6–20)
BUN/CREAT SERPL: 17 (ref 7–25)
CALCIUM SPEC-SCNC: 9.6 MG/DL (ref 8.6–10.5)
CHLORIDE SERPL-SCNC: 102 MMOL/L (ref 98–107)
CHOLEST SERPL-MCNC: 170 MG/DL (ref 0–200)
CO2 SERPL-SCNC: 28 MMOL/L (ref 22–29)
CREAT SERPL-MCNC: 0.88 MG/DL (ref 0.57–1)
EGFRCR SERPLBLD CKD-EPI 2021: 76.3 ML/MIN/1.73
GLOBULIN UR ELPH-MCNC: 2.4 GM/DL
GLUCOSE SERPL-MCNC: 90 MG/DL (ref 65–99)
HDLC SERPL-MCNC: 64 MG/DL (ref 40–60)
LDLC SERPL CALC-MCNC: 86 MG/DL (ref 0–100)
LDLC/HDLC SERPL: 1.31 {RATIO}
POTASSIUM SERPL-SCNC: 4.4 MMOL/L (ref 3.5–5.2)
PROT SERPL-MCNC: 6.8 G/DL (ref 6–8.5)
SODIUM SERPL-SCNC: 139 MMOL/L (ref 136–145)
TRIGL SERPL-MCNC: 110 MG/DL (ref 0–150)
TSH SERPL DL<=0.05 MIU/L-ACNC: 1.99 UIU/ML (ref 0.27–4.2)
VLDLC SERPL-MCNC: 20 MG/DL (ref 5–40)

## 2022-04-15 PROCEDURE — 80061 LIPID PANEL: CPT

## 2022-04-15 PROCEDURE — 99214 OFFICE O/P EST MOD 30 MIN: CPT | Performed by: FAMILY MEDICINE

## 2022-04-15 PROCEDURE — 36415 COLL VENOUS BLD VENIPUNCTURE: CPT

## 2022-04-15 PROCEDURE — 84443 ASSAY THYROID STIM HORMONE: CPT

## 2022-04-15 PROCEDURE — 80053 COMPREHEN METABOLIC PANEL: CPT

## 2022-04-15 RX ORDER — BUPROPION HYDROCHLORIDE 150 MG/1
300 TABLET, EXTENDED RELEASE ORAL 2 TIMES DAILY
Qty: 60 TABLET | Refills: 5 | Status: SHIPPED | OUTPATIENT
Start: 2022-04-15 | End: 2022-04-26 | Stop reason: ALTCHOICE

## 2022-04-15 RX ORDER — POTASSIUM CHLORIDE 1500 MG/1
TABLET, FILM COATED, EXTENDED RELEASE ORAL
COMMUNITY
Start: 2022-04-11 | End: 2022-04-15

## 2022-04-15 RX ORDER — BUPROPION HYDROCHLORIDE 150 MG/1
300 TABLET, EXTENDED RELEASE ORAL 2 TIMES DAILY
COMMUNITY
End: 2022-04-15 | Stop reason: SDUPTHER

## 2022-04-15 NOTE — PROGRESS NOTES
Chief Complaint  Hypertension (6 month follow up )    35 minutes were spent in this encounter, including the face-to-face encounter, chart review, documentation, and coordination of care.    Subjective          Lucina Payan presents to [facility] today for routine f/u of chronic issues.    She was going to go see Dr. Dillon Navarro for her bilateral hand arthritis but there was some kind of issue with the insurance, so we are going to get her in with Dr. Rushing instead so that she can stay local.      She is on Zoloft 150 mg and  Wellbutrin  mg for depression.  She is on trazodone 100 mg for insomnia.  She has been to a therapist at [facility] previously but is not seeing anyone currently.  She is really struggling lately.  Her  had finally admitted to her that he is addicted to the opiates he has been using for his chronic pain.  She is in charge of his meds now and is doing them out but he still gets angry with her at times if she refuses to give him medication when it is not for pain complaint.  She needs help understanding where to go from here with him.    She is on metoprolol succinate and irbesartan for hypertension.  She is on torsemide with  K+ supplement.  She follows with Dr. Kuldeep Pena.  S/p pacemaker placement for bradycardia.   No problems with CP, palpitations, shortness of air.  Her blood pressure has been well controlled.  She is following with Dr. Velasco at [facility] to monitor her thoracic aortic aneurysm.  She is on pravastatin for  hyperlipidemia.   No problems with myalgias.      She is on Synthroid for hypothyroidism.   She denies heat/cold intolerance or changes in hair or skin.       She has h/o gastric bypass.         Current Outpatient Medications:   •  buPROPion SR (WELLBUTRIN SR) 150 MG 12 hr tablet, Take 2 tablets by mouth 2 (Two) Times a Day., Disp: 60 tablet, Rfl: 5  •  diphenhydrAMINE (BENADRYL) 50 MG capsule, Take 50 mg by mouth.  "Once nightly as needed, Disp: , Rfl:   •  famotidine (PEPCID) 40 MG tablet, TAKE ONE TABLET BY MOUTH DAILY, Disp: 90 tablet, Rfl: 1  •  ferrous sulfate (FeroSul) 325 (65 FE) MG tablet, TAKE ONE TABLET BY MOUTH DAILY (Patient taking differently: Every other day), Disp: 90 tablet, Rfl: 0  •  ibuprofen (ADVIL,MOTRIN) 600 MG tablet, Take 600 mg by mouth As Needed. One tablet tid prn, Disp: , Rfl:   •  irbesartan (AVAPRO) 150 MG tablet, Take 1 tablet by mouth Daily., Disp: 90 tablet, Rfl: 3  •  levothyroxine (SYNTHROID, LEVOTHROID) 112 MCG tablet, TAKE ONE TABLET BY MOUTH EVERY MORNING, Disp: 90 tablet, Rfl: 1  •  metoprolol succinate XL (TOPROL-XL) 50 MG 24 hr tablet, Take 1 tablet by mouth Daily., Disp: 90 tablet, Rfl: 3  •  multivitamin with minerals tablet tablet, Take 1 tablet by mouth Daily., Disp: , Rfl:   •  potassium chloride (KLOR-CON) 20 MEQ CR tablet, Take 1 tablet by mouth Daily., Disp: 90 tablet, Rfl: 2  •  pravastatin (PRAVACHOL) 20 MG tablet, Take 1 tablet by mouth Daily., Disp: 90 tablet, Rfl: 3  •  sertraline (ZOLOFT) 50 MG tablet, Take 3 tablets by mouth Daily., Disp: 270 tablet, Rfl: 1  •  torsemide (DEMADEX) 20 MG tablet, TAKE ONE TABLET BY MOUTH DAILY, Disp: 90 tablet, Rfl: 2  •  traZODone (DESYREL) 100 MG tablet, Take 1 tablet by mouth every night at bedtime., Disp: 90 tablet, Rfl: 1    Allergies:  Demerol [meperidine], Morphine, and Silver      Objective   Vital Signs:   Vitals:    04/15/22 1348   BP: 105/83   BP Location: Left arm   Patient Position: Sitting   Pulse: 97   Weight: 90.3 kg (199 lb)   Height: 170.2 cm (67\")       Physical Exam  Vitals reviewed.   Constitutional:       General: She is not in acute distress.     Appearance: Normal appearance. She is well-developed.   HENT:      Head: Normocephalic and atraumatic.      Right Ear: External ear normal.      Left Ear: External ear normal.   Eyes:      Extraocular Movements: Extraocular movements intact.      Conjunctiva/sclera: " Conjunctivae normal.      Pupils: Pupils are equal, round, and reactive to light.   Cardiovascular:      Rate and Rhythm: Normal rate and regular rhythm.      Heart sounds: No murmur heard.  Pulmonary:      Effort: Pulmonary effort is normal.      Breath sounds: Normal breath sounds. No wheezing, rhonchi or rales.   Abdominal:      General: Bowel sounds are normal. There is no distension.      Palpations: Abdomen is soft.      Tenderness: There is no abdominal tenderness.   Musculoskeletal:         General: Normal range of motion.   Neurological:      Mental Status: She is alert.   Psychiatric:         Mood and Affect: Affect normal.             Assessment and Plan    Diagnoses and all orders for this visit:    1. Moderate episode of recurrent major depressive disorder (HCC) (Primary)  Assessment & Plan:  Refill sent today and Wellbutrin.  Continue Zoloft as well.  I think the meds she is on are really doing a pretty good job for her.  Her main stressor at this point is her 's addiction and lack of treatment he is currently receiving for it.  We discussed his options as far as that goes today and I made some recommendations for [facility] in [facility] which is an Addiction Medicine and Recovery program.  I have also recommended that she consider reaching out to [facility] locally to get enrolled in the support group for herself.    Orders:  -     buPROPion SR (WELLBUTRIN SR) 150 MG 12 hr tablet; Take 2 tablets by mouth 2 (Two) Times a Day.  Dispense: 60 tablet; Refill: 5    2. Essential hypertension  Assessment & Plan:  Blood pressure at goal.  Continue metoprolol succinate 50 mg daily and irbesartan 150 mg daily.  No refills needed.  Checking labs.  She is following with Cardiology.    Orders:  -     Lipid Panel; Future  -     Comprehensive Metabolic Panel; Future    3. Mixed hyperlipidemia  Assessment & Plan:  Checking labs.      4. Acquired hypothyroidism  -     TSH; Future    5. Primary osteoarthritis of  hands, bilateral  Assessment & Plan:  She was going to go see Dr. Navarro upstairs but there was some kind of issue with the insurance, so instead we are going to refer her to Dr. [facility] so that she can stay here in [place] to be treated locally.    Orders:  -     Ambulatory Referral to Orthopedic Surgery      Follow Up   Return in about 3 months (around 7/15/2022) for Annual physical.  Patient was given instructions and counseling regarding her condition or for health maintenance advice. Please see specific information pulled into the AVS if appropriate.

## 2022-04-15 NOTE — ASSESSMENT & PLAN NOTE
Blood pressure at goal.  Continue metoprolol succinate 50 mg daily and irbesartan 150 mg daily.  No refills needed.  Checking labs.  She is following with Cardiology.

## 2022-04-15 NOTE — ASSESSMENT & PLAN NOTE
Refill sent today and Wellbutrin.  Continue Zoloft as well.  I think the meds she is on are really doing a pretty good job for her.  Her main stressor at this point is her 's addiction and lack of treatment he is currently receiving for it.  We discussed his options as far as that goes today and I made some recommendations for [facility] in [facility] which is an Addiction Medicine and Recovery program.  I have also recommended that she consider reaching out to [facility] locally to get enrolled in the support group for herself.

## 2022-04-20 DIAGNOSIS — F33.1 MODERATE EPISODE OF RECURRENT MAJOR DEPRESSIVE DISORDER: ICD-10-CM

## 2022-04-20 RX ORDER — LEVOTHYROXINE SODIUM 112 UG/1
TABLET ORAL
Qty: 30 TABLET | OUTPATIENT
Start: 2022-04-20

## 2022-04-26 ENCOUNTER — TELEPHONE (OUTPATIENT)
Dept: FAMILY MEDICINE CLINIC | Age: 57
End: 2022-04-26

## 2022-04-26 RX ORDER — BUPROPION HYDROCHLORIDE 300 MG/1
300 TABLET ORAL DAILY
Qty: 90 TABLET | Refills: 1 | Status: SHIPPED | OUTPATIENT
Start: 2022-04-26 | End: 2022-07-14

## 2022-04-26 NOTE — TELEPHONE ENCOUNTER
I will change the medication.  Please let Lucina know that her insurance is not wanting to pay for the Wellbutrin  mg twice daily as she has been taking it.  Instead, I am going to switch her over to Wellbutrin  mg daily, which should give her essentially the same effect.  Please let me know if she has any problems with the change.  Thanks, MARSHAL

## 2022-04-26 NOTE — TELEPHONE ENCOUNTER
PA or change med?    Drug  buPROPion HCl ER (SR) 150MG er tablets    Key: [id number]    Original Claim Info  76 ANTHEM/MAXIMUM DAILY DOSE OF 2.0000

## 2022-04-28 ENCOUNTER — HOSPITAL ENCOUNTER (OUTPATIENT)
Dept: CARDIOLOGY | Facility: HOSPITAL | Age: 57
Discharge: HOME | End: 2022-04-28

## 2022-04-28 ENCOUNTER — HOSPITAL ENCOUNTER (OUTPATIENT)
Dept: CT IMAGING | Facility: HOSPITAL | Age: 57
Discharge: HOME | End: 2022-04-28

## 2022-04-28 DIAGNOSIS — I71.21 ASCENDING AORTIC ANEURYSM: ICD-10-CM

## 2022-04-28 PROCEDURE — 93306 TTE W/DOPPLER COMPLETE: CPT

## 2022-04-28 PROCEDURE — 71250 CT THORAX DX C-: CPT

## 2022-04-28 PROCEDURE — 93306 TTE W/DOPPLER COMPLETE: CPT | Performed by: SPECIALIST

## 2022-04-29 ENCOUNTER — TELEPHONE (OUTPATIENT)
Dept: FAMILY MEDICINE CLINIC | Age: 57
End: 2022-04-29

## 2022-04-29 DIAGNOSIS — F43.21 GRIEF REACTION: Primary | ICD-10-CM

## 2022-04-29 LAB
AORTIC DIMENSIONLESS INDEX: 0.9 (DI)
ASCENDING AORTA: 4 CM
BH CV ECHO MEAS - AI P1/2T: 726 MSEC
BH CV ECHO MEAS - AO MAX PG: 6 MMHG
BH CV ECHO MEAS - AO MEAN PG: 3 MMHG
BH CV ECHO MEAS - AO ROOT DIAM: 3.4 CM
BH CV ECHO MEAS - AO V2 MAX: 125 CM/SEC
BH CV ECHO MEAS - AO V2 VTI: 22.9 CM
BH CV ECHO MEAS - EDV(MOD-SP2): 72.4 ML
BH CV ECHO MEAS - EDV(MOD-SP4): 88.2 ML
BH CV ECHO MEAS - EF(MOD-BP): 65 %
BH CV ECHO MEAS - ESV(MOD-SP2): 28.6 ML
BH CV ECHO MEAS - ESV(MOD-SP4): 30.6 ML
BH CV ECHO MEAS - IVSD: 0.9 CM
BH CV ECHO MEAS - LA A2CS (ATRIAL LENGTH): 5.2 CM
BH CV ECHO MEAS - LA DIMENSION(2D): 2.9 CM
BH CV ECHO MEAS - LAT PEAK E' VEL: 12.3 CM/SEC
BH CV ECHO MEAS - LV MAX PG: 4 MMHG
BH CV ECHO MEAS - LV MEAN PG: 2 MMHG
BH CV ECHO MEAS - LV V1 MAX: 103 CM/SEC
BH CV ECHO MEAS - LV V1 VTI: 19.4 CM
BH CV ECHO MEAS - LVIDD: 4.1 CM
BH CV ECHO MEAS - LVIDS: 2.7 CM
BH CV ECHO MEAS - LVOT DIAM: 2.1 CM
BH CV ECHO MEAS - LVPWD: 0.9 CM
BH CV ECHO MEAS - MED PEAK E' VEL: 11.3 CM/SEC
BH CV ECHO MEAS - MV A MAX VEL: 70.7 CM/SEC
BH CV ECHO MEAS - MV DEC SLOPE: 210 CM/SEC2
BH CV ECHO MEAS - MV DEC TIME: 243 MSEC
BH CV ECHO MEAS - MV E MAX VEL: 51 CM/SEC
BH CV ECHO MEAS - MV E/A: 0.7
BH CV ECHO MEAS - MV MAX PG: 2 MMHG
BH CV ECHO MEAS - MV MEAN PG: 1 MMHG
BH CV ECHO MEAS - MV P1/2T: 68 MSEC
BH CV ECHO MEAS - MV V2 VTI: 16 CM
BH CV ECHO MEAS - MVA(P1/2T): 3.2 CM2
BH CV ECHO MEAS - PA V2 MAX: 49 CM/SEC
BH CV ECHO MEAS - RAP SYSTOLE: 3 MMHG
BH CV ECHO MEAS - RVDD: 3.1 CM
BH CV ECHO MEAS - RVSP: 20 MMHG
BH CV ECHO MEAS - TR MAX PG: 17 MMHG
BH CV ECHO MEAS - TR MAX VEL: 205 CM/SEC
BH CV ECHO MEASUREMENTS AVERAGE E/E' RATIO: 4.32
IVRT: 81 MSEC
LEFT ATRIUM VOLUME INDEX: 23.6 ML/M2
LEFT ATRIUM VOLUME: 31.1 CM3
MAXIMAL PREDICTED HEART RATE: 162 BPM
PISA AR MAX VEL: 408 M/S
STRESS TARGET HR: 138 BPM

## 2022-04-29 RX ORDER — LORAZEPAM 0.5 MG/1
0.5 TABLET ORAL 2 TIMES DAILY PRN
Qty: 10 TABLET | Refills: 0 | Status: SHIPPED | OUTPATIENT
Start: 2022-04-29 | End: 2022-07-22

## 2022-04-29 NOTE — TELEPHONE ENCOUNTER
Caller: Lucina Payan    Relationship: Self    Best call back number: [phone]    If a prescription is needed, what is your preferred pharmacy and phone number: [facility] - [place], [place] - [address]  - [phone]  - [phone]     Additional notes:    PATIENT STATES HER  PASSED AWAY LAST NIGHT AND SHE WOULD LIKE SOMETHING TO HELP HER STOP CRYING FOR THE NEXT WEEK. PLEASE CALL PATIENT WHEN PRESCRIPTION IS CALLED IN.

## 2022-04-29 NOTE — TELEPHONE ENCOUNTER
Please let Lucina know that I have sent in some lorazepam that she can use for the short term.  Please see if she would like me to see her next week and we can put her in a same day spot.  I am so sorry for her loss.  Thanks, MARSHAL

## 2022-05-05 ENCOUNTER — OFFICE VISIT (OUTPATIENT)
Dept: FAMILY MEDICINE CLINIC | Age: 57
End: 2022-05-05

## 2022-05-05 VITALS
SYSTOLIC BLOOD PRESSURE: 113 MMHG | DIASTOLIC BLOOD PRESSURE: 79 MMHG | HEIGHT: 67 IN | WEIGHT: 181 LBS | HEART RATE: 81 BPM | BODY MASS INDEX: 28.41 KG/M2

## 2022-05-05 DIAGNOSIS — F43.21 GRIEF REACTION: Primary | ICD-10-CM

## 2022-05-05 DIAGNOSIS — F33.1 MODERATE EPISODE OF RECURRENT MAJOR DEPRESSIVE DISORDER: ICD-10-CM

## 2022-05-05 PROBLEM — F43.20 GRIEF REACTION: Status: ACTIVE | Noted: 2022-05-05

## 2022-05-05 PROCEDURE — 99214 OFFICE O/P EST MOD 30 MIN: CPT | Performed by: FAMILY MEDICINE

## 2022-05-05 NOTE — PROGRESS NOTES
Chief Complaint  Depression    Subjective          Lucina Payan presents to [facility] today for depression and grief.  Her , whom we discussed last time is finally being willing to accept some help for his opiate addiction, has unfortunately passed away and Lucina is grieving.  She did call in last week requesting a medication that she could take to help her get through the next few days, and I did send her in a small course of lorazepam.        Current Outpatient Medications:   •  buPROPion XL (Wellbutrin XL) 300 MG 24 hr tablet, Take 1 tablet by mouth Daily., Disp: 90 tablet, Rfl: 1  •  diphenhydrAMINE (BENADRYL) 50 MG capsule, Take 50 mg by mouth. Once nightly as needed, Disp: , Rfl:   •  famotidine (PEPCID) 40 MG tablet, TAKE ONE TABLET BY MOUTH DAILY, Disp: 90 tablet, Rfl: 1  •  ferrous sulfate (FeroSul) 325 (65 FE) MG tablet, TAKE ONE TABLET BY MOUTH DAILY (Patient taking differently: Every other day), Disp: 90 tablet, Rfl: 0  •  ibuprofen (ADVIL,MOTRIN) 600 MG tablet, Take 600 mg by mouth As Needed. One tablet tid prn, Disp: , Rfl:   •  irbesartan (AVAPRO) 150 MG tablet, Take 1 tablet by mouth Daily., Disp: 90 tablet, Rfl: 3  •  levothyroxine (SYNTHROID, LEVOTHROID) 112 MCG tablet, TAKE ONE TABLET BY MOUTH EVERY MORNING, Disp: 90 tablet, Rfl: 1  •  LORazepam (ATIVAN) 0.5 MG tablet, Take 1 tablet by mouth 2 (Two) Times a Day As Needed for Anxiety. Take 30 min prior to procedure, Disp: 10 tablet, Rfl: 0  •  metoprolol succinate XL (TOPROL-XL) 50 MG 24 hr tablet, Take 1 tablet by mouth Daily., Disp: 90 tablet, Rfl: 3  •  multivitamin with minerals tablet tablet, Take 1 tablet by mouth Daily., Disp: , Rfl:   •  potassium chloride (KLOR-CON) 20 MEQ CR tablet, Take 1 tablet by mouth Daily., Disp: 90 tablet, Rfl: 2  •  pravastatin (PRAVACHOL) 20 MG tablet, Take 1 tablet by mouth Daily., Disp: 90 tablet, Rfl: 3  •  sertraline (ZOLOFT) 50 MG tablet, TAKE THREE TABLETS BY MOUTH  "DAILY, Disp: 90 tablet, Rfl: 0  •  torsemide (DEMADEX) 20 MG tablet, TAKE ONE TABLET BY MOUTH DAILY, Disp: 90 tablet, Rfl: 2  •  traZODone (DESYREL) 100 MG tablet, Take 1 tablet by mouth every night at bedtime., Disp: 90 tablet, Rfl: 1    Allergies:  Demerol [meperidine], Morphine, and Silver      Objective   Vital Signs:   Vitals:    22 1435   BP: 113/79   BP Location: Left arm   Patient Position: Sitting   Pulse: 81   Weight: 82.1 kg (181 lb)   Height: 170.2 cm (67\")       Physical Exam        Assessment and Plan    Diagnoses and all orders for this visit:    1. Grief reaction (Primary)  Assessment & Plan:  Lucina is grieving the loss of her .  This has obviously been a devastating experience for her.  We spoke at length today about the events leading up to his death and how she was the one who found him.  Support offered and reassurance provided that she did absolutely everything that she could have done.  She fortunately does have good support through his family members, although sadly her own family members have been less supportive than could be desired.  We did talk today about the [facility] grief support group that is run locally, and she is interested in possibly reaching out to them.  I have given her contact information for that group.  She thinks that she may already have received their information from the  home, so hopefully she will be able to connect with them and find some comfort and peace there.  She did take the lorazepam that I sent in for her last week and that has been helpful for her to get over the acutely traumatic events of the recent past.  She will let me know if there is anything else I can do for her going forward.  Otherwise, I will plan to see her back as scheduled in July.      2. Moderate episode of recurrent major depressive disorder (HCC)  Assessment & Plan:  As per grief reaction plan.        Follow Up   Return for Or as scheduled 2022.  Patient was given " instructions and counseling regarding her condition or for health maintenance advice. Please see specific information pulled into the AVS if appropriate.     35 minutes were spent in this encounter, including the face-to-face encounter, chart review, documentation, and coordination of care.    Answers for HPI/ROS submitted by the patient on 5/3/2022  Please describe your symptoms.: Dealing with death of   Have you had these symptoms before?: No  How long have you been having these symptoms?: 1-4 days  Please list any medications you are currently taking for this condition.: She has full list  Please describe any probable cause for these symptoms. :  passed away 4/29/22  What is the primary reason for your visit?: Other

## 2022-05-05 NOTE — ASSESSMENT & PLAN NOTE
Lucina is grieving the loss of her .  This has obviously been a devastating experience for her.  We spoke at length today about the events leading up to his death and how she was the one who found him.  Support offered and reassurance provided that she did absolutely everything that she could have done.  She fortunately does have good support through his family members, although sadly her own family members have been less supportive than could be desired.  We did talk today about the Hospice grief support group that is run locally, and she is interested in possibly reaching out to them.  I have given her contact information for that group.  She thinks that she may already have received their information from the  home, so hopefully she will be able to connect with them and find some comfort and peace there.  She did take the lorazepam that I sent in for her last week and that has been helpful for her to get over the acutely traumatic events of the recent past.  She will let me know if there is anything else I can do for her going forward.  Otherwise, I will plan to see her back as scheduled in July.

## 2022-05-11 DIAGNOSIS — F33.1 MODERATE EPISODE OF RECURRENT MAJOR DEPRESSIVE DISORDER: ICD-10-CM

## 2022-05-11 RX ORDER — TRAZODONE HYDROCHLORIDE 100 MG/1
TABLET ORAL
Qty: 90 TABLET | Refills: 1 | Status: SHIPPED | OUTPATIENT
Start: 2022-05-11 | End: 2022-12-07

## 2022-05-16 ENCOUNTER — TELEPHONE (OUTPATIENT)
Dept: FAMILY MEDICINE CLINIC | Age: 57
End: 2022-05-16

## 2022-05-16 DIAGNOSIS — Z12.31 ENCOUNTER FOR SCREENING MAMMOGRAM FOR BREAST CANCER: Primary | ICD-10-CM

## 2022-05-26 DIAGNOSIS — F33.1 MODERATE EPISODE OF RECURRENT MAJOR DEPRESSIVE DISORDER: ICD-10-CM

## 2022-07-01 ENCOUNTER — HOSPITAL ENCOUNTER (OUTPATIENT)
Dept: MAMMOGRAPHY | Facility: HOSPITAL | Age: 57
Discharge: HOME | End: 2022-07-01
Admitting: FAMILY MEDICINE

## 2022-07-01 ENCOUNTER — APPOINTMENT (OUTPATIENT)
Dept: MAMMOGRAPHY | Facility: HOSPITAL | Age: 57
End: 2022-07-01

## 2022-07-01 DIAGNOSIS — Z12.31 ENCOUNTER FOR SCREENING MAMMOGRAM FOR BREAST CANCER: ICD-10-CM

## 2022-07-01 PROCEDURE — 77063 BREAST TOMOSYNTHESIS BI: CPT

## 2022-07-01 PROCEDURE — 77067 SCR MAMMO BI INCL CAD: CPT

## 2022-07-07 ENCOUNTER — TELEPHONE (OUTPATIENT)
Dept: FAMILY MEDICINE CLINIC | Age: 57
End: 2022-07-07

## 2022-07-07 NOTE — TELEPHONE ENCOUNTER
Caller: Lucina Payan    Relationship to patient: Self    Best call back number: [phone]    Patient is needing: PATIENT STATES HER RIGHT HAND RING FINGER KNUCKLE SMASHED OR DISLOCATED IT. PATIENT STATES IT HAS BEEN A MONTH AND IT IS NOT BETTER. PATIENT IS ASKING FOR AN ORDER FOR AN XRAY. PLEASE CALL PATIENT WITH AN UPDATE.

## 2022-07-08 ENCOUNTER — OFFICE VISIT (OUTPATIENT)
Dept: FAMILY MEDICINE CLINIC | Age: 57
End: 2022-07-08

## 2022-07-08 ENCOUNTER — HOSPITAL ENCOUNTER (OUTPATIENT)
Dept: GENERAL RADIOLOGY | Facility: HOSPITAL | Age: 57
Discharge: HOME | End: 2022-07-08

## 2022-07-08 VITALS
SYSTOLIC BLOOD PRESSURE: 113 MMHG | BODY MASS INDEX: 28.47 KG/M2 | HEIGHT: 67 IN | WEIGHT: 181.4 LBS | HEART RATE: 70 BPM | DIASTOLIC BLOOD PRESSURE: 71 MMHG

## 2022-07-08 DIAGNOSIS — M79.644 FINGER PAIN, RIGHT: ICD-10-CM

## 2022-07-08 DIAGNOSIS — M79.644 FINGER PAIN, RIGHT: Primary | ICD-10-CM

## 2022-07-08 PROCEDURE — 99213 OFFICE O/P EST LOW 20 MIN: CPT | Performed by: FAMILY MEDICINE

## 2022-07-08 PROCEDURE — 73140 X-RAY EXAM OF FINGER(S): CPT

## 2022-07-08 PROCEDURE — 73120 X-RAY EXAM OF HAND: CPT

## 2022-07-08 NOTE — PROGRESS NOTES
Chief Complaint  Hand Pain (Smashed right hand, ring finger knuckle x 1 month ago. )    Subjective          Lucina Payan presents to [facility] today for acute complaint of right hand pain in the ring finger and knuckle for the past 1 month.  She was planting trees with an auger when she lost control and wrench the ring finger on the R hand severely.  Pain was immediate.  Swelling involved the ring finger and the 3rd MCP.  Swelling has subsided but is still present.  Pain is still significant. S he has been buddy taping the fingers.      Current Outpatient Medications:   •  buPROPion XL (Wellbutrin XL) 300 MG 24 hr tablet, Take 1 tablet by mouth Daily., Disp: 90 tablet, Rfl: 1  •  diphenhydrAMINE (BENADRYL) 50 MG capsule, Take 50 mg by mouth. Once nightly as needed, Disp: , Rfl:   •  famotidine (PEPCID) 40 MG tablet, TAKE ONE TABLET BY MOUTH DAILY, Disp: 90 tablet, Rfl: 1  •  ferrous sulfate (FeroSul) 325 (65 FE) MG tablet, TAKE ONE TABLET BY MOUTH DAILY (Patient taking differently: Every other day), Disp: 90 tablet, Rfl: 0  •  ibuprofen (ADVIL,MOTRIN) 600 MG tablet, Take 600 mg by mouth As Needed. One tablet tid prn, Disp: , Rfl:   •  irbesartan (AVAPRO) 150 MG tablet, Take 1 tablet by mouth Daily., Disp: 90 tablet, Rfl: 3  •  levothyroxine (SYNTHROID, LEVOTHROID) 112 MCG tablet, TAKE ONE TABLET BY MOUTH EVERY MORNING, Disp: 90 tablet, Rfl: 1  •  LORazepam (ATIVAN) 0.5 MG tablet, Take 1 tablet by mouth 2 (Two) Times a Day As Needed for Anxiety. Take 30 min prior to procedure, Disp: 10 tablet, Rfl: 0  •  metoprolol succinate XL (TOPROL-XL) 50 MG 24 hr tablet, Take 1 tablet by mouth Daily., Disp: 90 tablet, Rfl: 3  •  multivitamin with minerals tablet tablet, Take 1 tablet by mouth Daily., Disp: , Rfl:   •  potassium chloride (KLOR-CON) 20 MEQ CR tablet, Take 1 tablet by mouth Daily., Disp: 90 tablet, Rfl: 2  •  pravastatin (PRAVACHOL) 20 MG tablet, Take 1 tablet by mouth Daily.,  "Disp: 90 tablet, Rfl: 3  •  sertraline (ZOLOFT) 50 MG tablet, TAKE THREE TABLETS BY MOUTH DAILY, Disp: 270 tablet, Rfl: 1  •  torsemide (DEMADEX) 20 MG tablet, TAKE ONE TABLET BY MOUTH DAILY, Disp: 90 tablet, Rfl: 2  •  traZODone (DESYREL) 100 MG tablet, TAKE ONE TABLET BY MOUTH EVERY NIGHT AT BEDTIME, Disp: 90 tablet, Rfl: 1    Allergies:  Demerol [meperidine], Morphine, and Silver      Objective   Vital Signs:   Vitals:    07/08/22 1418   BP: 113/71   BP Location: Left arm   Patient Position: Sitting   Pulse: 70   Weight: 82.3 kg (181 lb 6.4 oz)   Height: 170.2 cm (67.01\")       Physical Exam  Constitutional:       Appearance: Normal appearance.   HENT:      Head: Normocephalic and atraumatic.   Eyes:      Extraocular Movements: Extraocular movements intact.      Conjunctiva/sclera: Conjunctivae normal.   Pulmonary:      Effort: Pulmonary effort is normal. No respiratory distress.   Musculoskeletal:         General: Tenderness (Right ring finger MCP and PIP with significant tenderness, no erythema or edema, no pain with axial loading, significant pain with flexion of PIP and MCP) present. Normal range of motion.   Skin:     General: Skin is warm and dry.   Neurological:      General: No focal deficit present.      Mental Status: She is alert and oriented to person, place, and time.   Psychiatric:         Mood and Affect: Mood normal.         Behavior: Behavior normal.         Thought Content: Thought content normal.         Judgment: Judgment normal.             Assessment and Plan    Diagnoses and all orders for this visit:    1. Finger pain, right (Primary)  Assessment & Plan:  X-ray today shows osteoarthritis and demineralization but no acute fracture of the fourth digit right hand.  Suspect severe sprain.  She may continue buddy taping the 2 fingers for pain control.  Call or return to clinic as needed worsening or failure to improve symptoms.  She does have an appointment with me for routine issues in about 3 " weeks and we can follow-up on the finger at that time.    Orders:  -     XR Hand 2 View Right; Future  -     XR Finger 2+ View Right; Future      Follow Up   Return if symptoms worsen or fail to improve, for Or as scheduled 7/22/2022.  Patient was given instructions and counseling regarding her condition or for health maintenance advice. Please see specific information pulled into the AVS if appropriate.

## 2022-07-08 NOTE — ASSESSMENT & PLAN NOTE
X-ray today shows osteoarthritis and demineralization but no acute fracture of the fourth digit right hand.  Suspect severe sprain.  She may continue buddy taping the 2 fingers for pain control.  Call or return to clinic as needed worsening or failure to improve symptoms.  She does have an appointment with me for routine issues in about 3 weeks and we can follow-up on the finger at that time.

## 2022-07-14 RX ORDER — BUPROPION HYDROCHLORIDE 300 MG/1
TABLET ORAL
Qty: 30 TABLET | Refills: 0 | Status: SHIPPED | OUTPATIENT
Start: 2022-07-14 | End: 2022-08-16

## 2022-07-22 ENCOUNTER — OFFICE VISIT (OUTPATIENT)
Dept: FAMILY MEDICINE CLINIC | Age: 57
End: 2022-07-22

## 2022-07-22 VITALS
BODY MASS INDEX: 29.03 KG/M2 | SYSTOLIC BLOOD PRESSURE: 109 MMHG | HEART RATE: 74 BPM | WEIGHT: 185 LBS | HEIGHT: 67 IN | DIASTOLIC BLOOD PRESSURE: 77 MMHG

## 2022-07-22 DIAGNOSIS — Z00.00 ANNUAL PHYSICAL EXAM: Primary | ICD-10-CM

## 2022-07-22 DIAGNOSIS — F33.1 MODERATE EPISODE OF RECURRENT MAJOR DEPRESSIVE DISORDER: ICD-10-CM

## 2022-07-22 DIAGNOSIS — M79.644 FINGER PAIN, RIGHT: ICD-10-CM

## 2022-07-22 PROCEDURE — 99214 OFFICE O/P EST MOD 30 MIN: CPT | Performed by: FAMILY MEDICINE

## 2022-07-22 PROCEDURE — 99396 PREV VISIT EST AGE 40-64: CPT | Performed by: FAMILY MEDICINE

## 2022-07-22 RX ORDER — ARIPIPRAZOLE 2 MG/1
2 TABLET ORAL NIGHTLY
Qty: 30 TABLET | Refills: 2 | Status: SHIPPED | OUTPATIENT
Start: 2022-07-22 | End: 2022-09-30

## 2022-07-22 NOTE — PROGRESS NOTES
Chief Complaint  Annual Exam    Subjective          Lucina Payan presents to [facility] today for her annual physical.      She is UTD on colonoscopy, last done 12/2019 and this showed diverticulosis.  Ten year repeat recommended.  She is up-to-date on pap smear, last done 4/2021 and this was NIL with negative high-risk HPV.  She is UTD on mammogram, last done 7/2022 and this was normal.  She is UTD on COVID (x3, due for shot #4), flu (10/2021).  She is due for Shingrix and Td.  She is UTD on routine labs.    She is on Zoloft 150 mg and Wellbutrin XL 300 mg daily for depression.  She is on trazodone 100 mg for insomnia.  She has been to a therapist at [facility] previously but is not seeing anyone currently.  Still really suffering.  +Crying spells.  Passive death wishes (no SI).  She did go to the Hospice group over the summer.     She is on irbesartan and metoprolol succinate for HTN.  She is on torsemide with  K+ for pedal edema.  She follows with Dr. [facility].  S/p pacemaker placement for bradycardia.  She denies chest pain, palpitations,  or shortness of breath.  Her BPM has been well controlled.  Follows with Dr. Velasco at [facility] for her thoracic aortic aneurysm.  She is on pravastatin for HLD.  She denies myalgias.      She is on levothyroxine for hypothyroidism.  No cold/heat intolerance, changes in hair or skin.       She has h/o gastric bypass.    Review of Systems   Constitutional: Negative for chills, fatigue and fever.   HENT: Negative for congestion, hearing loss and rhinorrhea.    Eyes: Negative for pain and visual disturbance.   Respiratory: Negative for cough and shortness of breath.    Cardiovascular: Negative for chest pain and palpitations.   Gastrointestinal: Negative for abdominal pain, constipation, diarrhea, nausea and vomiting.   Genitourinary: Negative for dysuria and hematuria.   Musculoskeletal: Negative for arthralgias and myalgias.    Skin: Negative for rash.   Neurological: Negative for weakness and numbness.   Psychiatric/Behavioral: Negative for dysphoric mood and sleep disturbance. The patient is not nervous/anxious.          Current Outpatient Medications:   •  buPROPion XL (WELLBUTRIN XL) 300 MG 24 hr tablet, TAKE ONE TABLET BY MOUTH DAILY, Disp: 30 tablet, Rfl: 0  •  diphenhydrAMINE (BENADRYL) 50 MG capsule, Take 50 mg by mouth. Once nightly as needed, Disp: , Rfl:   •  famotidine (PEPCID) 40 MG tablet, TAKE ONE TABLET BY MOUTH DAILY, Disp: 90 tablet, Rfl: 1  •  ferrous sulfate (FeroSul) 325 (65 FE) MG tablet, TAKE ONE TABLET BY MOUTH DAILY (Patient taking differently: Every other day), Disp: 90 tablet, Rfl: 0  •  ibuprofen (ADVIL,MOTRIN) 600 MG tablet, Take 600 mg by mouth As Needed. One tablet tid prn, Disp: , Rfl:   •  irbesartan (AVAPRO) 150 MG tablet, Take 1 tablet by mouth Daily., Disp: 90 tablet, Rfl: 3  •  levothyroxine (SYNTHROID, LEVOTHROID) 112 MCG tablet, TAKE ONE TABLET BY MOUTH EVERY MORNING, Disp: 90 tablet, Rfl: 1  •  metoprolol succinate XL (TOPROL-XL) 50 MG 24 hr tablet, Take 1 tablet by mouth Daily., Disp: 90 tablet, Rfl: 3  •  multivitamin with minerals tablet tablet, Take 1 tablet by mouth Daily., Disp: , Rfl:   •  potassium chloride (KLOR-CON) 20 MEQ CR tablet, Take 1 tablet by mouth Daily., Disp: 90 tablet, Rfl: 2  •  pravastatin (PRAVACHOL) 20 MG tablet, Take 1 tablet by mouth Daily., Disp: 90 tablet, Rfl: 3  •  sertraline (ZOLOFT) 50 MG tablet, TAKE THREE TABLETS BY MOUTH DAILY, Disp: 270 tablet, Rfl: 1  •  torsemide (DEMADEX) 20 MG tablet, TAKE ONE TABLET BY MOUTH DAILY, Disp: 90 tablet, Rfl: 2  •  traZODone (DESYREL) 100 MG tablet, TAKE ONE TABLET BY MOUTH EVERY NIGHT AT BEDTIME, Disp: 90 tablet, Rfl: 1  •  ARIPiprazole (Abilify) 2 MG tablet, Take 1 tablet by mouth Every Night., Disp: 30 tablet, Rfl: 2    Allergies:  Demerol [meperidine], Morphine, and Silver      Objective   Vital Signs:   Vitals:    07/22/22  "1122   BP: 109/77   BP Location: Left arm   Patient Position: Sitting   Pulse: 74   Weight: 83.9 kg (185 lb)   Height: 170.2 cm (67.01\")     Physical Exam  Vitals reviewed.   Constitutional:       General: She is not in acute distress.     Appearance: Normal appearance. She is well-developed.   HENT:      Head: Normocephalic and atraumatic.      Right Ear: External ear normal.      Left Ear: External ear normal.      Mouth/Throat:      Mouth: Mucous membranes are moist.   Eyes:      Extraocular Movements: Extraocular movements intact.      Conjunctiva/sclera: Conjunctivae normal.      Pupils: Pupils are equal, round, and reactive to light.   Cardiovascular:      Rate and Rhythm: Normal rate and regular rhythm.      Heart sounds: No murmur heard.  Pulmonary:      Effort: Pulmonary effort is normal.      Breath sounds: Normal breath sounds. No wheezing, rhonchi or rales.   Abdominal:      General: Bowel sounds are normal. There is no distension.      Palpations: Abdomen is soft.      Tenderness: There is no abdominal tenderness.   Musculoskeletal:         General: Normal range of motion.   Skin:     General: Skin is warm and dry.   Neurological:      Mental Status: She is alert and oriented to person, place, and time.      Deep Tendon Reflexes: Reflexes normal.      Comments: +imbalance   Psychiatric:         Mood and Affect: Mood and affect normal.         Behavior: Behavior normal.         Thought Content: Thought content normal.         Judgment: Judgment normal.             Assessment and Plan    Diagnoses and all orders for this visit:    1. Annual physical exam (Primary)  Assessment & Plan:  She is UTD on colonoscopy, last done 12/2019 and this showed diverticulosis.  Ten year repeat recommended.  She is up-to-date on pap smear, last done 4/2021 and this was NIL with negative high-risk HPV.  She is UTD on mammogram, last done 7/2022 and this was normal.  She is UTD on COVID (x3, due for shot #4 - plans to get at " pharmacy so she can stick with Moderna), flu (10/2021).  She is due for Shingrix and Td.Shingrix and Tdap can be done at the pharmacy.  She is UTD on routine labs.      2. Moderate episode of recurrent major depressive disorder (HCC)  Assessment & Plan:  Complicated grief.  She did go to the Hospice grief support group this past summer and it was helpful.  Planning to go again when they restart in the fall.  Encouraged her to reach out to her previous therapist to see about doing some individual grief counseling as well.  In the meantime, will add Abilify as an adjunct to see if we can improve her effect of the meds.      Orders:  -     ARIPiprazole (Abilify) 2 MG tablet; Take 1 tablet by mouth Every Night.  Dispense: 30 tablet; Refill: 2    3. Finger pain, right  Assessment & Plan:  Persistent finger pain.  XR was negative.  Unable to get MRI due to pacemaker.  Referral placed to Dr. Navarro for further eval and treatment.  Continue dee taping in the meantime.    Orders:  -     Ambulatory Referral to Orthopedic Surgery      Follow Up   Return in about 2 months (around 9/22/2022) for Recheck.  Patient was given instructions and counseling regarding her condition or for health maintenance advice. Please see specific information pulled into the AVS if appropriate.

## 2022-07-22 NOTE — ASSESSMENT & PLAN NOTE
Persistent finger pain.  XR was negative.  Unable to get MRI due to pacemaker.  Referral placed to Dr. Navarro for further eval and treatment.  Continue dee taping in the meantime.

## 2022-07-22 NOTE — ASSESSMENT & PLAN NOTE
She is UTD on colonoscopy, last done 12/2019 and this showed diverticulosis.  Ten year repeat recommended.  She is up-to-date on pap smear, last done 4/2021 and this was NIL with negative high-risk HPV.  She is UTD on mammogram, last done 7/2022 and this was normal.  She is UTD on COVID (x3, due for shot #4 - plans to get at pharmacy so she can stick with Moderna), flu (10/2021).  She is due for Shingrix and Td.Shingrix and Tdap can be done at the pharmacy.  She is UTD on routine labs.

## 2022-07-22 NOTE — ASSESSMENT & PLAN NOTE
Complicated grief.  She did go to the Hospice grief support group this past summer and it was helpful.  Planning to go again when they restart in the fall.  Encouraged her to reach out to her previous therapist to see about doing some individual grief counseling as well.  In the meantime, will add Abilify as an adjunct to see if we can improve her effect of the meds.

## 2022-07-28 RX ORDER — LEVOTHYROXINE SODIUM 112 UG/1
TABLET ORAL
Qty: 90 TABLET | Refills: 0 | Status: SHIPPED | OUTPATIENT
Start: 2022-07-28 | End: 2022-10-24

## 2022-07-29 ENCOUNTER — OFFICE VISIT (OUTPATIENT)
Dept: ORTHOPEDIC SURGERY | Facility: CLINIC | Age: 57
End: 2022-07-29

## 2022-07-29 VITALS — TEMPERATURE: 98.1 F | WEIGHT: 181.8 LBS | HEIGHT: 67 IN | BODY MASS INDEX: 28.53 KG/M2

## 2022-07-29 DIAGNOSIS — S69.91XA: Primary | ICD-10-CM

## 2022-07-29 PROCEDURE — 99204 OFFICE O/P NEW MOD 45 MIN: CPT | Performed by: PHYSICIAN ASSISTANT

## 2022-07-31 PROBLEM — S69.91XA: Status: ACTIVE | Noted: 2022-07-31

## 2022-08-02 ENCOUNTER — OFFICE VISIT (OUTPATIENT)
Dept: CARDIOLOGY | Facility: CLINIC | Age: 57
End: 2022-08-02

## 2022-08-02 VITALS
HEIGHT: 67 IN | OXYGEN SATURATION: 97 % | WEIGHT: 185 LBS | SYSTOLIC BLOOD PRESSURE: 119 MMHG | BODY MASS INDEX: 29.03 KG/M2 | DIASTOLIC BLOOD PRESSURE: 80 MMHG | HEART RATE: 74 BPM

## 2022-08-02 DIAGNOSIS — I10 ESSENTIAL HYPERTENSION: ICD-10-CM

## 2022-08-02 DIAGNOSIS — Z95.0 PRESENCE OF CARDIAC PACEMAKER: Primary | ICD-10-CM

## 2022-08-02 DIAGNOSIS — I71.20 THORACIC AORTIC ANEURYSM WITHOUT RUPTURE: ICD-10-CM

## 2022-08-02 PROCEDURE — 99213 OFFICE O/P EST LOW 20 MIN: CPT | Performed by: INTERNAL MEDICINE

## 2022-08-02 RX ORDER — METOPROLOL SUCCINATE 25 MG/1
25 TABLET, EXTENDED RELEASE ORAL DAILY
Qty: 90 TABLET | Refills: 2 | Status: SHIPPED | OUTPATIENT
Start: 2022-08-02 | End: 2023-02-07 | Stop reason: SDUPTHER

## 2022-08-02 NOTE — PROGRESS NOTES
CARDIOLOGY FOLLOW-UP PROGRESS NOTE        Chief Complaint  Follow-up, Pacemaker Check, Hypertension, and Hyperlipidemia    Subjective            Lucina Payan presents to [facility]  History of Present Illness      Ms Payan is is here for routine 6-month follow-up visit.  She denies having any chest pain, shortness of breath or palpitations.  She is currently taking metoprolol orally every other day.  Denies any chest pain or palpitations.  No dizziness.  She currently follows up with cardiothoracic surgeons for thoracic aortic aneurysm.  Her  passed away in 2022.       Past History:     1) Status post permanent pacemaker placement for bradycardia in ; 2) Ascending aortic aneurysm; 3) Hypertension; 4) Hyperlipidemia.    Medical History:  Past Medical History:   Diagnosis Date   • Cardiac pacemaker    • Depression    • GERD (gastroesophageal reflux disease)    • Hyperlipidemia    • Hypertension    • Hypothyroidism    • Thoracic aortic aneurysm (HCC)        Surgical History: has a past surgical history that includes Appendectomy; Cholecystectomy; tonsillectomy and adenoidectomy; postpartum tubal ligation;  section; Gastric bypass; and Cardiac pacemaker placement.     Family History: family history includes Hypertension in her father and mother; Liver cancer in her mother.     Social History: reports that she has been smoking. She has been smoking about 0.50 packs per day. She has never used smokeless tobacco. She reports previous alcohol use. She reports that she does not use drugs.    Allergies: Demerol [meperidine], Morphine, and Silver    Current Outpatient Medications on File Prior to Visit   Medication Sig   • ARIPiprazole (Abilify) 2 MG tablet Take 1 tablet by mouth Every Night.   • diphenhydrAMINE (BENADRYL) 50 MG capsule Take 50 mg by mouth. Once nightly as needed   • famotidine (PEPCID) 40 MG tablet TAKE ONE TABLET BY MOUTH DAILY   • ferrous sulfate  "(FeroSul) 325 (65 FE) MG tablet TAKE ONE TABLET BY MOUTH DAILY (Patient taking differently: Every other day)   • ibuprofen (ADVIL,MOTRIN) 600 MG tablet Take 600 mg by mouth As Needed. One tablet tid prn   • irbesartan (AVAPRO) 150 MG tablet Take 1 tablet by mouth Daily.   • levothyroxine (SYNTHROID, LEVOTHROID) 112 MCG tablet TAKE ONE TABLET BY MOUTH EVERY MORNING   • multivitamin with minerals tablet tablet Take 1 tablet by mouth Daily.   • potassium chloride (KLOR-CON) 20 MEQ CR tablet Take 1 tablet by mouth Daily.   • pravastatin (PRAVACHOL) 20 MG tablet Take 1 tablet by mouth Daily.   • sertraline (ZOLOFT) 50 MG tablet TAKE THREE TABLETS BY MOUTH DAILY   • torsemide (DEMADEX) 20 MG tablet TAKE ONE TABLET BY MOUTH DAILY   • traZODone (DESYREL) 100 MG tablet TAKE ONE TABLET BY MOUTH EVERY NIGHT AT BEDTIME   Metoprolol succinate 50 mg p.o. every other day    Review of Systems   Respiratory: Negative for cough, shortness of breath and wheezing.    Cardiovascular: Negative for chest pain, palpitations and leg swelling.   Gastrointestinal: Negative for nausea and vomiting.   Neurological: Negative for dizziness and syncope.        Objective     /80   Pulse 74   Ht 170.2 cm (67.01\")   Wt 83.9 kg (185 lb)   SpO2 97%   BMI 28.97 kg/m²       Physical Exam    General : Alert, awake, no acute distress  Neck : Supple, no carotid bruit, no jugular venous distention  CVS : Regular rate and rhythm, no murmur, rubs or gallops  Lungs: Clear to auscultation bilaterally, no crackles or rhonchi  Abdomen: Soft, nontender, bowel sounds heard in all 4 quadrants  Extremities: Warm, well-perfused, no pedal edema    Result Review :     The following data was reviewed by: Yayo Light MD on 08/02/2022:    CMP    CMP 10/15/21 4/15/22   Glucose 86 90   BUN 12 15   Creatinine 0.78 0.88   eGFR Non African Am 76    Sodium 145 139   Potassium 3.8 4.4   Chloride 103 102   Calcium 9.6 9.6   Albumin 4.50 4.40   Total Bilirubin 0.3 " 0.3   Alkaline Phosphatase 120 (A) 116   AST (SGOT) 30 30   ALT (SGPT) 20 20   (A) Abnormal value              TSH    TSH 10/15/21 4/15/22   TSH 2.460 1.990           Lipid Panel    Lipid Panel 10/15/21 4/15/22   Total Cholesterol 160 170   Triglycerides 83 110   HDL Cholesterol 63 (A) 64 (A)   VLDL Cholesterol 16 20   LDL Cholesterol  81 86   LDL/HDL Ratio 1.28 1.31   (A) Abnormal value                 Data reviewed: Cardiology studies        Results for orders placed during the hospital encounter of 04/28/22    Adult Transthoracic Echo Complete W/ Cont if Necessary Per Protocol    Interpretation Summary  Normal left ventricular systolic function.  Appears to be pacemaker lead in the right ventricle.  Trace MR.  Mild TR.  Trace AI.  Aortic root normal size.  Aorta appears to be enlarged.                   Assessment and Plan        Diagnoses and all orders for this visit:    1. Presence of cardiac pacemaker (Primary)  Assessment & Plan:  She is on home remote monitoring which continues to show normal function device with normal lead parameters.  We will do interrogation office during next 6-month follow-up visit.  Continue remote monitoring for now.      2. Essential hypertension  Assessment & Plan:  Blood pressures well controlled.  Continue irbesartan 150 mg daily.  Will adjust the dose of metoprolol to 25 mg daily, since the patient is currently taking 50 mg every other day.    Orders:  -     metoprolol succinate XL (TOPROL-XL) 25 MG 24 hr tablet; Take 1 tablet by mouth Daily.  Dispense: 90 tablet; Refill: 2    3. Thoracic aortic aneurysm without rupture (HCC)  Assessment & Plan:  4.8 cm per recent echocardiogram.  She was seen by Dr. Velasco in 5/2022 who advised ongoing monitoring.  Blood pressure is well controlled at this time.  We will continue statins.              Follow Up     Return in about 6 months (around 2/2/2023) for Device check, Recheck at [facility] .    Patient was given instructions and counseling  regarding her condition or for health maintenance advice. Please see specific information pulled into the AVS if appropriate.

## 2022-08-08 ENCOUNTER — HOSPITAL ENCOUNTER (OUTPATIENT)
Dept: CT IMAGING | Facility: HOSPITAL | Age: 57
Discharge: HOME | End: 2022-08-08
Admitting: PHYSICIAN ASSISTANT

## 2022-08-08 DIAGNOSIS — S69.91XA: ICD-10-CM

## 2022-08-08 PROCEDURE — 73200 CT UPPER EXTREMITY W/O DYE: CPT

## 2022-08-16 RX ORDER — BUPROPION HYDROCHLORIDE 300 MG/1
TABLET ORAL
Qty: 30 TABLET | Refills: 0 | Status: SHIPPED | OUTPATIENT
Start: 2022-08-16 | End: 2022-09-14

## 2022-08-19 ENCOUNTER — OFFICE VISIT (OUTPATIENT)
Dept: ORTHOPEDIC SURGERY | Facility: CLINIC | Age: 57
End: 2022-08-19

## 2022-08-19 VITALS — HEIGHT: 67 IN | WEIGHT: 18 LBS | BODY MASS INDEX: 2.82 KG/M2 | TEMPERATURE: 97.6 F

## 2022-08-19 DIAGNOSIS — S69.91XD: Primary | ICD-10-CM

## 2022-08-19 PROCEDURE — 99213 OFFICE O/P EST LOW 20 MIN: CPT | Performed by: PHYSICIAN ASSISTANT

## 2022-08-19 NOTE — PROGRESS NOTES
"Chief Complaint  Follow-up of the Right Hand    Subjective    History of Present Illness {CC  Problem List  Visit Diagnosis   Encounters  Notes  Medications  Labs  Result Review Imaging  Media :23}     Lucina Payan is a 56 y.o. female who presents to [facility] for   History of Present Illness   Pain Location: {AS Body Parts:59132}  Radiation: {asradiationpain:22425}  Quality: {asquality:87126}  Intensity/Severity: {asseveritypain:84541}  Duration: {Time:92151}  Progression of symptoms: {Stucker yes no:25734}  Onset quality: {asonsetquality:85643}  Timing: {astimin}  Aggravating Factors: {AS Aggravating Factors Ortho:44961}  Alleviating Factors: {AS Alleviating Factors:22779}  Previous Episodes: {aspreviousepisodes:28846}  Associated Symptoms: {asassociatedsymptoms:89478}  ADLs Affected: {ADL ASMEH:64097}  Previous Treatment: {AS previous treatment:36905}       Objective   Vital Signs:   Temp 97.6 °F (36.4 °C)   Ht 170.2 cm (67\")   Wt 8.165 kg (18 lb)   BMI 2.82 kg/m²     Physical Exam  Physical Exam  Vitals signs and nursing note reviewed.   Constitutional:       Appearance: Normal appearance.   Pulmonary:      Effort: Pulmonary effort is normal.   Skin:     General: Skin is warm and dry.      Capillary Refill: Capillary refill takes less than 2 seconds.   Neurological:      General: No focal deficit present.      Mental Status: He is alert and oriented to person, place, and time. Mental status is at baseline.   Psychiatric:         Mood and Affect: Mood normal.         Behavior: Behavior normal.         Thought Content: Thought content normal.         Judgment: Judgment normal.     Ortho Exam   {Musculoskeletal Exams:51479}    {Post Op Total Joint Arthroplasty Exam:05909}    {Post Op Detailed Exam:34970}        Result Review :{ Labs  Result Review  Imaging  Med Tab  Media :23}   The following data was reviewed by: Natalie Acosta MA on 2022:  {Data " reviewed (optional):63676}  {Diagnostics options AS:64845}            Procedures           Assessment   Assessment and Plan {CC Problem List  Visit Diagnosis  ROS  Review (Popup)  Health Maintenance  Quality  BestPractice  Medications  SmartSets  SnapShot Encounters  Media :23}   There are no diagnoses linked to this encounter.    {Time Spent (Optional):95816}  Follow Up {Instructions Charge Capture  Follow-up Communications :23}  · Compression/brace  · Rest, ice, compression, and elevation (RICE) therapy  · Stretching and strengthening exercises  · OTC {OTC pain:79016}  · Follow up in *** {WEEKS/MONTHS:64611}  • Patient was given instructions and counseling regarding her condition or for health maintenance advice. Please see specific information pulled into the AVS if appropriate.     Natalie Acosta MA   Date of Encounter: 8/19/2022   Electronically signed by Natalie Acosta MA, 08/19/22, 8:30 AM EDT.     EMR Dragon/Transcription disclaimer:  Much of this encounter note is an electronic transcription/translation of spoken language to printed text. The electronic translation of spoken language may permit erroneous, or at times, nonsensical words or phrases to be inadvertently transcribed; Although I have reviewed the note for such errors, some may still exist.

## 2022-08-19 NOTE — PROGRESS NOTES
"Chief Complaint  Follow-up of the Right Hand    Subjective    History of Present Illness      Lucina Payan is a 56 y.o. female who presents to [facility] for follow up on pain in the right ring finger/MCP joint. At her initial visit I decided to proceed with an CT scan of the hand, since she was unable to do MRI. Today we will discuss those results. She reports improvement but states the area is still very sensitive.    PRIOR IMAGING  She reports injury to the hand on 5/14/22 when she was working with an auger but lost control causing a twisting motion of the hand. She reports pain and swelling that occurred of the right ring finger and MCP, as well as the 3rd MCP joint. Her pain is described as severe aching/burning/stabbing. She reports that dee tapping the fingers provides the most relief from her pain. If there is any force placed against the tips of the fingers it causes significant pain. There is also pain with palpation of the area. She is able to make a fist, although not complete.         Objective   Vital Signs:   Temp 97.6 °F (36.4 °C)   Ht 170.2 cm (67\")   Wt 8.165 kg (18 lb)   BMI 2.82 kg/m²     Physical Exam  Vitals signs and nursing note reviewed.   Constitutional:       Appearance: Normal appearance.   Pulmonary:      Effort: Pulmonary effort is normal.   Skin:     General: Skin is warm and dry.      Capillary Refill: Capillary refill takes less than 2 seconds.   Neurological:      General: No focal deficit present.      Mental Status: He is alert and oriented to person, place, and time. Mental status is at baseline.   Psychiatric:         Mood and Affect: Mood normal.         Behavior: Behavior normal.         Thought Content: Thought content normal.         Judgment: Judgment normal.     Ortho Exam   RIGHT hand  There is mild soft tissue swelling of dorsal and volar aspects of the 4th finger.  There is no obvious fracture.   There is significant tenderness with " palpation of the 4th MCP joint and mild tenderness with palpation of the 3rd MCP.   Capillary refill is 2 seconds with a brisk return.  Flexor and extensor tendon function appear to be preserved.     Cascade of the fingers is fairly well preserved.   She is able to make a fist although it is limited because of pain and swelling.  There is no evidence of a compartmental syndrome.  There is no crossover deformity of the digit distally.      Result Review :   Radiologic studies - see below for interpretation   Reviewed CT report of right hand without contrast, performed at [facility] on 8/8/22, summary of impression below:   · Moderate degenerative changes at the first CMC joint  · Mild degenerative changes of the IP joints and wrist  · No evidence of periosteal reaction  · No loculated fluid collections or soft tissue mass.  · No acute osseous abnormalities    RIGHT hand and RIGHT 4th finger xrays   3 views were performed at [facility] on 7/8/22. Images were independently viewed and interpreted by myself, my impression as follows:  There is no acute bony abnormality, however there are degenerative changes at the CMC of thumb      PROCEDURE  Procedures           Assessment   Assessment and Plan    Diagnoses and all orders for this visit:    1. Injury of hand including fingers, right, subsequent encounter (Primary)  -     Ambulatory Referral to Hand Surgery         Follow Up   · Discussion of any imaging in detail. Discussion of orthopaedic goals.  · My biggest concern was the potential of the ligament injury, which we discussed.  Since the CT did not reveal any findings but she is still having such sensitivity, I would recommend we proceed with referral to hand and arm surgery for second opinion.  · Ice, heat, and/or modalities as beneficial  · Patient is encouraged to call or return for any issues or concerns.  • Patient was given instructions and counseling regarding her  condition or for health maintenance advice. Please see specific information pulled into the AVS if appropriate.     Madan Carpenter PA-C   Date of Encounter: 8/19/2022   Electronically signed by Madan Carpenter PA-C, 08/19/22, 9:07 AM EDT.      EMR Dragon/Transcription disclaimer:  Much of this encounter note is an electronic transcription/translation of spoken language to printed text. The electronic translation of spoken language may permit erroneous, or at times, nonsensical words or phrases to be inadvertently transcribed; Although I have reviewed the note for such errors, some may still exist.

## 2022-08-21 NOTE — ASSESSMENT & PLAN NOTE
She is on home remote monitoring which continues to show normal function device with normal lead parameters.  We will do interrogation office during next 6-month follow-up visit.  Continue remote monitoring for now.

## 2022-08-21 NOTE — ASSESSMENT & PLAN NOTE
Blood pressures well controlled.  Continue irbesartan 150 mg daily.  Will adjust the dose of metoprolol to 25 mg daily, since the patient is currently taking 50 mg every other day.

## 2022-08-21 NOTE — ASSESSMENT & PLAN NOTE
4.8 cm per recent echocardiogram.  She was seen by Dr. Velasco in 5/2022 who advised ongoing monitoring.  Blood pressure is well controlled at this time.  We will continue statins.

## 2022-09-14 RX ORDER — BUPROPION HYDROCHLORIDE 300 MG/1
TABLET ORAL
Qty: 30 TABLET | Refills: 0 | Status: SHIPPED | OUTPATIENT
Start: 2022-09-14 | End: 2022-10-12

## 2022-09-30 ENCOUNTER — LAB (OUTPATIENT)
Dept: LAB | Facility: HOSPITAL | Age: 57
End: 2022-09-30

## 2022-09-30 ENCOUNTER — OFFICE VISIT (OUTPATIENT)
Dept: FAMILY MEDICINE CLINIC | Age: 57
End: 2022-09-30

## 2022-09-30 VITALS
SYSTOLIC BLOOD PRESSURE: 122 MMHG | HEART RATE: 82 BPM | HEIGHT: 67 IN | WEIGHT: 183.6 LBS | DIASTOLIC BLOOD PRESSURE: 88 MMHG | BODY MASS INDEX: 28.82 KG/M2

## 2022-09-30 DIAGNOSIS — F33.1 MODERATE EPISODE OF RECURRENT MAJOR DEPRESSIVE DISORDER: Primary | ICD-10-CM

## 2022-09-30 DIAGNOSIS — E03.9 ACQUIRED HYPOTHYROIDISM: ICD-10-CM

## 2022-09-30 DIAGNOSIS — Z23 ENCOUNTER FOR IMMUNIZATION: ICD-10-CM

## 2022-09-30 DIAGNOSIS — F43.21 GRIEF REACTION: ICD-10-CM

## 2022-09-30 DIAGNOSIS — I10 ESSENTIAL HYPERTENSION: ICD-10-CM

## 2022-09-30 PROBLEM — S69.91XA: Status: RESOLVED | Noted: 2022-07-31 | Resolved: 2022-09-30

## 2022-09-30 PROBLEM — Z00.00 ANNUAL PHYSICAL EXAM: Status: RESOLVED | Noted: 2022-07-22 | Resolved: 2022-09-30

## 2022-09-30 PROBLEM — M79.644 FINGER PAIN, RIGHT: Status: RESOLVED | Noted: 2022-07-08 | Resolved: 2022-09-30

## 2022-09-30 LAB
ALBUMIN SERPL-MCNC: 4.3 G/DL (ref 3.5–5.2)
ALBUMIN/GLOB SERPL: 1.9 G/DL
ALP SERPL-CCNC: 119 U/L (ref 39–117)
ALT SERPL W P-5'-P-CCNC: 20 U/L (ref 1–33)
ANION GAP SERPL CALCULATED.3IONS-SCNC: 7.5 MMOL/L (ref 5–15)
AST SERPL-CCNC: 30 U/L (ref 1–32)
BILIRUB SERPL-MCNC: 0.4 MG/DL (ref 0–1.2)
BUN SERPL-MCNC: 13 MG/DL (ref 6–20)
BUN/CREAT SERPL: 15.3 (ref 7–25)
CALCIUM SPEC-SCNC: 9.4 MG/DL (ref 8.6–10.5)
CHLORIDE SERPL-SCNC: 100 MMOL/L (ref 98–107)
CHOLEST SERPL-MCNC: 169 MG/DL (ref 0–200)
CO2 SERPL-SCNC: 32.5 MMOL/L (ref 22–29)
CREAT SERPL-MCNC: 0.85 MG/DL (ref 0.57–1)
EGFRCR SERPLBLD CKD-EPI 2021: 79.5 ML/MIN/1.73
GLOBULIN UR ELPH-MCNC: 2.3 GM/DL
GLUCOSE SERPL-MCNC: 85 MG/DL (ref 65–99)
HDLC SERPL-MCNC: 71 MG/DL (ref 40–60)
LDLC SERPL CALC-MCNC: 82 MG/DL (ref 0–100)
LDLC/HDLC SERPL: 1.13 {RATIO}
POTASSIUM SERPL-SCNC: 4.2 MMOL/L (ref 3.5–5.2)
PROT SERPL-MCNC: 6.6 G/DL (ref 6–8.5)
SODIUM SERPL-SCNC: 140 MMOL/L (ref 136–145)
TRIGL SERPL-MCNC: 89 MG/DL (ref 0–150)
TSH SERPL DL<=0.05 MIU/L-ACNC: 3.91 UIU/ML (ref 0.27–4.2)
VLDLC SERPL-MCNC: 16 MG/DL (ref 5–40)

## 2022-09-30 PROCEDURE — 80061 LIPID PANEL: CPT

## 2022-09-30 PROCEDURE — 99214 OFFICE O/P EST MOD 30 MIN: CPT | Performed by: FAMILY MEDICINE

## 2022-09-30 PROCEDURE — 80053 COMPREHEN METABOLIC PANEL: CPT

## 2022-09-30 PROCEDURE — 90471 IMMUNIZATION ADMIN: CPT | Performed by: FAMILY MEDICINE

## 2022-09-30 PROCEDURE — 90686 IIV4 VACC NO PRSV 0.5 ML IM: CPT | Performed by: FAMILY MEDICINE

## 2022-09-30 PROCEDURE — 36415 COLL VENOUS BLD VENIPUNCTURE: CPT

## 2022-09-30 PROCEDURE — 84443 ASSAY THYROID STIM HORMONE: CPT

## 2022-09-30 NOTE — ASSESSMENT & PLAN NOTE
She could not tolerate the Abilify but feels that the Wellbutrin and sertraline are working tolerably well.  She continues to follow with the Hospice support group and [facility].  We discussed today that one-on-one grief counseling with [facility] would probably be helpful.  Things are still very hard.  She has made it through her anniversary, but the holidays are looming.  I will see her back around that time or sooner prn.

## 2022-09-30 NOTE — PROGRESS NOTES
"Chief Complaint  Depression (2 month follow up)    Subjective     {Problem List  Visit Diagnosis   Encounters  Notes  Medications  Labs  Result Review Imaging  Media :23}     Lucina Payan presents to [facility] today for follow-up of chronic issues.    She is on Zoloft 150 mg and Wellbutrin XL 300 mg daily for depression.  She is on trazodone 100 mg for insomnia.  \"It's so hard.\"  She stopped the Abilify because it made her gain weight.  She is still going to the Hospice support group and that is helpful.  She did make it through her anniversary on 9/9 but that was very tough.  It's hardest at bedtime.  The trazodone does help, however.  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  She is still cut off by her daughters, which is extremely difficult.     She is on irbesartan and metoprolol succinate for hypertension.  She is on torsemide with potassium for pedal edema.  Following with Dr. Light Cardiology.  S/p pacemaker placement for bradycardia.  No chest pain, palpitations,  or shortness of breath.  Her BPM has been well controlled.  Follows with Dr. Velasco at [facility] for her thoracic aortic aneurysm.  She is on pravastatin for hyperlipidemia.  She denies myalgias.      She is on levothyroxine for hypothyroidism.  No cold/heat intolerance, changes in hair or skin.       She has h/o gastric bypass.      Current Outpatient Medications:   •  buPROPion XL (WELLBUTRIN XL) 300 MG 24 hr tablet, TAKE ONE TABLET BY MOUTH DAILY, Disp: 30 tablet, Rfl: 0  •  diphenhydrAMINE (BENADRYL) 50 MG capsule, Take 50 mg by mouth. Once nightly as needed, Disp: , Rfl:   •  famotidine (PEPCID) 40 MG tablet, TAKE ONE TABLET BY MOUTH DAILY, Disp: 90 tablet, Rfl: 1  •  ferrous sulfate (FeroSul) 325 (65 FE) MG tablet, TAKE ONE TABLET BY MOUTH DAILY (Patient taking differently: Every other day), Disp: 90 tablet, " "Rfl: 0  •  ibuprofen (ADVIL,MOTRIN) 600 MG tablet, Take 600 mg by mouth As Needed. One tablet tid prn, Disp: , Rfl:   •  irbesartan (AVAPRO) 150 MG tablet, Take 1 tablet by mouth Daily., Disp: 90 tablet, Rfl: 3  •  levothyroxine (SYNTHROID, LEVOTHROID) 112 MCG tablet, TAKE ONE TABLET BY MOUTH EVERY MORNING, Disp: 90 tablet, Rfl: 0  •  metoprolol succinate XL (TOPROL-XL) 25 MG 24 hr tablet, Take 1 tablet by mouth Daily., Disp: 90 tablet, Rfl: 2  •  multivitamin with minerals tablet tablet, Take 1 tablet by mouth Daily., Disp: , Rfl:   •  potassium chloride (KLOR-CON) 20 MEQ CR tablet, Take 1 tablet by mouth Daily., Disp: 90 tablet, Rfl: 2  •  pravastatin (PRAVACHOL) 20 MG tablet, Take 1 tablet by mouth Daily., Disp: 90 tablet, Rfl: 3  •  sertraline (ZOLOFT) 50 MG tablet, TAKE THREE TABLETS BY MOUTH DAILY, Disp: 270 tablet, Rfl: 1  •  torsemide (DEMADEX) 20 MG tablet, TAKE ONE TABLET BY MOUTH DAILY, Disp: 90 tablet, Rfl: 2  •  traZODone (DESYREL) 100 MG tablet, TAKE ONE TABLET BY MOUTH EVERY NIGHT AT BEDTIME, Disp: 90 tablet, Rfl: 1    Allergies:  Demerol [meperidine], Morphine, and Silver      Objective   Vital Signs:   Vitals:    09/30/22 0755   BP: 122/88   BP Location: Left arm   Patient Position: Sitting   Pulse: 82   Weight: 83.3 kg (183 lb 9.6 oz)   Height: 170.2 cm (67.01\")       Physical Exam  Vitals reviewed.   Constitutional:       General: She is not in acute distress.     Appearance: Normal appearance. She is well-developed.   HENT:      Head: Normocephalic and atraumatic.      Right Ear: External ear normal.      Left Ear: External ear normal.   Eyes:      Extraocular Movements: Extraocular movements intact.      Conjunctiva/sclera: Conjunctivae normal.      Pupils: Pupils are equal, round, and reactive to light.   Cardiovascular:      Rate and Rhythm: Normal rate and regular rhythm.      Heart sounds: No murmur heard.  Pulmonary:      Effort: Pulmonary effort is normal.      Breath sounds: Normal breath " sounds. No wheezing, rhonchi or rales.   Abdominal:      General: Bowel sounds are normal. There is no distension.      Palpations: Abdomen is soft.      Tenderness: There is no abdominal tenderness.   Musculoskeletal:         General: Normal range of motion.   Neurological:      Mental Status: She is alert.   Psychiatric:         Mood and Affect: Affect normal.          Lab Results   Component Value Date    GLUCOSE 90 04/15/2022    BUN 15 04/15/2022    CREATININE 0.88 04/15/2022    EGFRIFNONA 76 10/15/2021    BCR 17.0 04/15/2022    K 4.4 04/15/2022    CO2 28.0 04/15/2022    CALCIUM 9.6 04/15/2022    ALBUMIN 4.40 04/15/2022    LABIL2 1.4 03/18/2021    AST 30 04/15/2022    ALT 20 04/15/2022       Lab Results   Component Value Date    CHOL 170 04/15/2022    CHLPL 161 03/18/2021    TRIG 110 04/15/2022    HDL 64 (H) 04/15/2022    LDL 86 04/15/2022            Assessment and Plan    Diagnoses and all orders for this visit:    1. Moderate episode of recurrent major depressive disorder (HCC) (Primary)  Assessment & Plan:  She could not tolerate the Abilify but feels that the Wellbutrin and sertraline are working tolerably well.  She continues to follow with the Hospice support group and Pastor Gordon.  We discussed today that one-on-one grief counseling with Pastor Gordon would probably be helpful.  Things are still very hard.  She has made it through her anniversary, but the holidays are looming.  I will see her back around that time or sooner prn.        2. Grief reaction    3. Essential hypertension  Assessment & Plan:  BP at goal.  Continue irbesartan 150 mg daily and metoprolol succinate 25 mg daily.  No refills needed.  Checking labs.      Orders:  -     Lipid Panel; Future  -     Comprehensive Metabolic Panel; Future    4. Acquired hypothyroidism  Assessment & Plan:  Stable on levothyroxine 112 mcg daily.  No refills needed.  Checking labs.    Orders:  -     TSH; Future    5. Encounter for immunization  -      FluLaval/Fluarix/Fluzone >6 Months      Follow Up   Return in about 3 months (around 12/30/2022) for Recheck.  Patient was given instructions and counseling regarding her condition or for health maintenance advice. Please see specific information pulled into the AVS if appropriate.

## 2022-10-12 RX ORDER — FAMOTIDINE 40 MG/1
TABLET, FILM COATED ORAL
Qty: 90 TABLET | Refills: 0 | Status: SHIPPED | OUTPATIENT
Start: 2022-10-12 | End: 2023-01-03

## 2022-10-12 RX ORDER — BUPROPION HYDROCHLORIDE 300 MG/1
TABLET ORAL
Qty: 90 TABLET | Refills: 0 | Status: SHIPPED | OUTPATIENT
Start: 2022-10-12 | End: 2023-03-06

## 2022-10-24 RX ORDER — LEVOTHYROXINE SODIUM 112 UG/1
TABLET ORAL
Qty: 90 TABLET | Refills: 0 | Status: SHIPPED | OUTPATIENT
Start: 2022-10-24 | End: 2023-01-24

## 2022-11-03 NOTE — TELEPHONE ENCOUNTER
Please advise.   Consent 3/Introductory Paragraph: I gave the patient a chance to ask questions they had about the procedure.  Following this I explained the Mohs procedure and consent was obtained. The risks, benefits and alternatives to therapy were discussed in detail. Specifically, the risks of infection, scarring, bleeding, prolonged wound healing, incomplete removal, allergy to anesthesia, nerve injury and recurrence were addressed. Prior to the procedure, the treatment site was clearly identified and confirmed by the patient. All components of Universal Protocol/PAUSE Rule completed.

## 2022-11-10 DIAGNOSIS — E78.2 MIXED HYPERLIPIDEMIA: ICD-10-CM

## 2022-11-10 RX ORDER — PRAVASTATIN SODIUM 20 MG
TABLET ORAL
Qty: 90 TABLET | Refills: 0 | Status: SHIPPED | OUTPATIENT
Start: 2022-11-10 | End: 2023-02-06

## 2022-11-23 RX ORDER — POTASSIUM CHLORIDE 1500 MG/1
TABLET, EXTENDED RELEASE ORAL
Qty: 90 TABLET | Refills: 1 | Status: SHIPPED | OUTPATIENT
Start: 2022-11-23 | End: 2023-03-29

## 2022-11-25 DIAGNOSIS — F33.1 MODERATE EPISODE OF RECURRENT MAJOR DEPRESSIVE DISORDER: ICD-10-CM

## 2022-12-01 DIAGNOSIS — I10 ESSENTIAL HYPERTENSION: ICD-10-CM

## 2022-12-01 DIAGNOSIS — I71.20 THORACIC AORTIC ANEURYSM WITHOUT RUPTURE: ICD-10-CM

## 2022-12-01 RX ORDER — IRBESARTAN 150 MG/1
TABLET ORAL
Qty: 90 TABLET | Refills: 1 | Status: SHIPPED | OUTPATIENT
Start: 2022-12-01 | End: 2023-02-07 | Stop reason: SDUPTHER

## 2022-12-06 DIAGNOSIS — F33.1 MODERATE EPISODE OF RECURRENT MAJOR DEPRESSIVE DISORDER: ICD-10-CM

## 2022-12-07 RX ORDER — TRAZODONE HYDROCHLORIDE 100 MG/1
TABLET ORAL
Qty: 90 TABLET | Refills: 1 | Status: SHIPPED | OUTPATIENT
Start: 2022-12-07

## 2022-12-15 RX ORDER — TORSEMIDE 20 MG/1
TABLET ORAL
Qty: 90 TABLET | Refills: 0 | Status: SHIPPED | OUTPATIENT
Start: 2022-12-15 | End: 2023-02-07 | Stop reason: SDUPTHER

## 2022-12-27 DIAGNOSIS — F33.1 MODERATE EPISODE OF RECURRENT MAJOR DEPRESSIVE DISORDER: ICD-10-CM

## 2023-01-02 DIAGNOSIS — F33.1 MODERATE EPISODE OF RECURRENT MAJOR DEPRESSIVE DISORDER: ICD-10-CM

## 2023-01-03 RX ORDER — FAMOTIDINE 40 MG/1
TABLET, FILM COATED ORAL
Qty: 90 TABLET | Refills: 0 | Status: SHIPPED | OUTPATIENT
Start: 2023-01-03 | End: 2023-04-03

## 2023-01-13 ENCOUNTER — OFFICE VISIT (OUTPATIENT)
Dept: FAMILY MEDICINE CLINIC | Age: 58
End: 2023-01-13
Payer: COMMERCIAL

## 2023-01-13 ENCOUNTER — LAB (OUTPATIENT)
Dept: LAB | Facility: HOSPITAL | Age: 58
End: 2023-01-13
Payer: COMMERCIAL

## 2023-01-13 VITALS
HEART RATE: 86 BPM | HEIGHT: 67 IN | WEIGHT: 189 LBS | SYSTOLIC BLOOD PRESSURE: 126 MMHG | DIASTOLIC BLOOD PRESSURE: 87 MMHG | BODY MASS INDEX: 29.66 KG/M2

## 2023-01-13 DIAGNOSIS — E03.9 ACQUIRED HYPOTHYROIDISM: ICD-10-CM

## 2023-01-13 DIAGNOSIS — R73.9 HYPERGLYCEMIA: ICD-10-CM

## 2023-01-13 DIAGNOSIS — E78.2 MIXED HYPERLIPIDEMIA: ICD-10-CM

## 2023-01-13 DIAGNOSIS — I10 ESSENTIAL HYPERTENSION: ICD-10-CM

## 2023-01-13 DIAGNOSIS — E61.1 IRON DEFICIENCY: ICD-10-CM

## 2023-01-13 DIAGNOSIS — I49.5 SSS (SICK SINUS SYNDROME): ICD-10-CM

## 2023-01-13 DIAGNOSIS — R79.89 ELEVATED FERRITIN: ICD-10-CM

## 2023-01-13 DIAGNOSIS — F33.1 MODERATE EPISODE OF RECURRENT MAJOR DEPRESSIVE DISORDER: Primary | ICD-10-CM

## 2023-01-13 PROBLEM — F43.21 GRIEF REACTION: Status: RESOLVED | Noted: 2022-05-05 | Resolved: 2023-01-13

## 2023-01-13 PROBLEM — F43.20 GRIEF REACTION: Status: RESOLVED | Noted: 2022-05-05 | Resolved: 2023-01-13

## 2023-01-13 LAB
BASOPHILS # BLD AUTO: 0.06 10*3/MM3 (ref 0–0.2)
BASOPHILS NFR BLD AUTO: 0.9 % (ref 0–1.5)
DEPRECATED RDW RBC AUTO: 42.5 FL (ref 37–54)
EOSINOPHIL # BLD AUTO: 0.02 10*3/MM3 (ref 0–0.4)
EOSINOPHIL NFR BLD AUTO: 0.3 % (ref 0.3–6.2)
ERYTHROCYTE [DISTWIDTH] IN BLOOD BY AUTOMATED COUNT: 12 % (ref 12.3–15.4)
HCT VFR BLD AUTO: 45.7 % (ref 34–46.6)
HGB BLD-MCNC: 15.3 G/DL (ref 12–15.9)
IMM GRANULOCYTES # BLD AUTO: 0.01 10*3/MM3 (ref 0–0.05)
IMM GRANULOCYTES NFR BLD AUTO: 0.1 % (ref 0–0.5)
LYMPHOCYTES # BLD AUTO: 1.9 10*3/MM3 (ref 0.7–3.1)
LYMPHOCYTES NFR BLD AUTO: 27.6 % (ref 19.6–45.3)
MCH RBC QN AUTO: 31.7 PG (ref 26.6–33)
MCHC RBC AUTO-ENTMCNC: 33.5 G/DL (ref 31.5–35.7)
MCV RBC AUTO: 94.8 FL (ref 79–97)
MONOCYTES # BLD AUTO: 0.57 10*3/MM3 (ref 0.1–0.9)
MONOCYTES NFR BLD AUTO: 8.3 % (ref 5–12)
NEUTROPHILS NFR BLD AUTO: 4.32 10*3/MM3 (ref 1.7–7)
NEUTROPHILS NFR BLD AUTO: 62.8 % (ref 42.7–76)
PLATELET # BLD AUTO: 231 10*3/MM3 (ref 140–450)
PMV BLD AUTO: 10 FL (ref 6–12)
RBC # BLD AUTO: 4.82 10*6/MM3 (ref 3.77–5.28)
WBC NRBC COR # BLD: 6.88 10*3/MM3 (ref 3.4–10.8)

## 2023-01-13 PROCEDURE — 82728 ASSAY OF FERRITIN: CPT

## 2023-01-13 PROCEDURE — 83540 ASSAY OF IRON: CPT

## 2023-01-13 PROCEDURE — 84466 ASSAY OF TRANSFERRIN: CPT

## 2023-01-13 PROCEDURE — 36415 COLL VENOUS BLD VENIPUNCTURE: CPT

## 2023-01-13 PROCEDURE — 99214 OFFICE O/P EST MOD 30 MIN: CPT | Performed by: FAMILY MEDICINE

## 2023-01-13 PROCEDURE — 80050 GENERAL HEALTH PANEL: CPT

## 2023-01-13 PROCEDURE — 83036 HEMOGLOBIN GLYCOSYLATED A1C: CPT

## 2023-01-13 RX ORDER — POTASSIUM CHLORIDE 1500 MG/1
TABLET, FILM COATED, EXTENDED RELEASE ORAL
COMMUNITY
Start: 2022-12-13 | End: 2023-01-13

## 2023-01-13 RX ORDER — FERROUS SULFATE 325(65) MG
TABLET ORAL
Qty: 90 TABLET | Refills: 1 | Status: SHIPPED | OUTPATIENT
Start: 2023-01-13

## 2023-01-13 NOTE — ASSESSMENT & PLAN NOTE
Needs refills on her iron today.  She has been taking this every other day.  Her last iron studies reportedly came back little high, so she was decreased down to once every other day but it is somewhat hard for her to remember to take it in this fashion.  Checking labs again today.  Refills sent.  We will see if we can make her regimen a little bit easier for her.

## 2023-01-13 NOTE — ASSESSMENT & PLAN NOTE
Stable on metoprolol succinate 25 mg daily and irbesartan 150 mg daily.  No refills needed.  Checking labs.

## 2023-01-13 NOTE — ASSESSMENT & PLAN NOTE
She continues to struggle with Hayder's passing.  His loss is always in front of her.  She tries to stay busy during the day but it is hardest at night.  The medications are working well to allow her to sleep.  She is going to take a break from the Hospice support group.  She did get some security cameras so that she can feel safer by herself at home at night.  She is also considering getting a dog for that purpose.

## 2023-01-13 NOTE — PROGRESS NOTES
Chief Complaint  Depression (3 month follow up)    Subjective          Lucina Payan presents to [facility] today for routine follow-up on chronic issues.    She is on Zoloft 150 mg and Wellbutrin XL 300 mg daily for depression.  She is on trazodone 100 mg for insomnia.  Abilify caused weight gain.  She is still going to the Hospice support group and that is helpful, especially in the absence of family support from her daughters.     She is on irbesartan and metoprolol succinate for HTN.  She is on torsemide and K+ for pedal edema.  Follows with Dr. Light Cardiology.  S/p pacemaker placement for bradycardia and sick sinus syndrome.  Denies chest pain, palpitations,  or shortness of breath.  Her blood pressure has been well controlled.  Follows with Dr. Velasco at [facility] for her thoracic aortic aneurysm.  She is on pravastatin for HLD.  No problems with myalgias.      She is on levothyroxine for hypothyroidism.  She denies heat/cold intolerance, changes in hair or skin.       She has h/o gastric bypass.      Current Outpatient Medications:   •  buPROPion XL (WELLBUTRIN XL) 300 MG 24 hr tablet, TAKE ONE TABLET BY MOUTH DAILY, Disp: 90 tablet, Rfl: 0  •  diphenhydrAMINE (BENADRYL) 50 MG capsule, Take 50 mg by mouth. Once nightly as needed, Disp: , Rfl:   •  famotidine (PEPCID) 40 MG tablet, TAKE ONE TABLET BY MOUTH DAILY, Disp: 90 tablet, Rfl: 0  •  ferrous sulfate (FeroSul) 325 (65 FE) MG tablet, Every other day, Disp: 90 tablet, Rfl: 1  •  ibuprofen (ADVIL,MOTRIN) 600 MG tablet, Take 600 mg by mouth As Needed. One tablet tid prn, Disp: , Rfl:   •  irbesartan (AVAPRO) 150 MG tablet, TAKE ONE TABLET BY MOUTH DAILY, Disp: 90 tablet, Rfl: 1  •  KLOR-CON 20 MEQ CR tablet, TAKE ONE TABLET BY MOUTH DAILY, Disp: 90 tablet, Rfl: 1  •  levothyroxine (SYNTHROID, LEVOTHROID) 112 MCG tablet, TAKE ONE TABLET BY MOUTH EVERY MORNING, Disp: 90 tablet, Rfl: 0  •  metoprolol succinate XL (TOPROL-XL)  "25 MG 24 hr tablet, Take 1 tablet by mouth Daily., Disp: 90 tablet, Rfl: 2  •  multivitamin with minerals tablet tablet, Take 1 tablet by mouth Daily., Disp: , Rfl:   •  pravastatin (PRAVACHOL) 20 MG tablet, TAKE ONE TABLET BY MOUTH DAILY, Disp: 90 tablet, Rfl: 0  •  sertraline (ZOLOFT) 50 MG tablet, Take 3 tablets by mouth Daily., Disp: 90 tablet, Rfl: 2  •  torsemide (DEMADEX) 20 MG tablet, TAKE ONE TABLET BY MOUTH DAILY, Disp: 90 tablet, Rfl: 0  •  traZODone (DESYREL) 100 MG tablet, TAKE ONE TABLET BY MOUTH EVERY NIGHT AT BEDTIME, Disp: 90 tablet, Rfl: 1    Allergies:  Demerol [meperidine], Morphine, and Silver      Objective   Vital Signs:   Vitals:    01/13/23 1007   BP: 126/87   BP Location: Left arm   Patient Position: Sitting   Pulse: 86   Weight: 85.7 kg (189 lb)   Height: 170.2 cm (67.01\")       Physical Exam  Vitals reviewed.   Constitutional:       General: She is not in acute distress.     Appearance: Normal appearance. She is well-developed.   HENT:      Head: Normocephalic and atraumatic.      Right Ear: External ear normal.      Left Ear: External ear normal.   Eyes:      Extraocular Movements: Extraocular movements intact.      Conjunctiva/sclera: Conjunctivae normal.      Pupils: Pupils are equal, round, and reactive to light.   Cardiovascular:      Rate and Rhythm: Normal rate and regular rhythm.      Heart sounds: No murmur heard.  Pulmonary:      Effort: Pulmonary effort is normal.      Breath sounds: Normal breath sounds. No wheezing, rhonchi or rales.   Abdominal:      General: Bowel sounds are normal. There is no distension.      Palpations: Abdomen is soft.      Tenderness: There is no abdominal tenderness.   Musculoskeletal:         General: Normal range of motion.   Neurological:      Mental Status: She is alert.   Psychiatric:         Mood and Affect: Affect normal.          Lab Results   Component Value Date    GLUCOSE 85 09/30/2022    BUN 13 09/30/2022    CREATININE 0.85 09/30/2022    " EGFRIFNONA 76 10/15/2021    BCR 15.3 09/30/2022    K 4.2 09/30/2022    CO2 32.5 (H) 09/30/2022    CALCIUM 9.4 09/30/2022    ALBUMIN 4.30 09/30/2022    LABIL2 1.4 03/18/2021    AST 30 09/30/2022    ALT 20 09/30/2022       Lab Results   Component Value Date    CHOL 169 09/30/2022    CHLPL 161 03/18/2021    TRIG 89 09/30/2022    HDL 71 (H) 09/30/2022    LDL 82 09/30/2022            Assessment and Plan    Diagnoses and all orders for this visit:    1. Moderate episode of recurrent major depressive disorder (HCC) (Primary)  Assessment & Plan:  She continues to struggle with Hayder's passing.  His loss is always in front of her.  She tries to stay busy during the day but it is hardest at night.  The medications are working well to allow her to sleep.  She is going to take a break from the Hospice support group.  She did get some security cameras so that she can feel safer by herself at home at night.  She is also considering getting a dog for that purpose.      2. Essential hypertension  Assessment & Plan:  Stable on metoprolol succinate 25 mg daily and irbesartan 150 mg daily.  No refills needed.  Checking labs.    Orders:  -     Comprehensive Metabolic Panel; Future    3. Mixed hyperlipidemia  Assessment & Plan:  Stable on pravastatin 20 mg daily.  No refills needed.  Labs reviewed and up-to-date.      4. SSS (sick sinus syndrome) ([facility])  Overview:  Following with Cardiology.  Status post pacemaker placement.      5. Acquired hypothyroidism  Assessment & Plan:  Stable on levothyroxine 112 mcg daily.  No refills needed.  Checking labs.    Orders:  -     TSH; Future    6. Hyperglycemia  Assessment & Plan:  Checking A1c.    Orders:  -     Hemoglobin A1c; Future    7. Iron deficiency  Assessment & Plan:  Needs refills on her iron today.  She has been taking this every other day.  Her last iron studies reportedly came back little high, so she was decreased down to once every other day but it is somewhat hard for her to remember  to take it in this fashion.  Checking labs again today.  Refills sent.  We will see if we can make her regimen a little bit easier for her.    Orders:  -     ferrous sulfate (FeroSul) 325 (65 FE) MG tablet; Every other day  Dispense: 90 tablet; Refill: 1  -     CBC & Differential; Future  -     Iron Profile; Future  -     Ferritin; Future      Follow Up   Return in about 2 months (around 3/13/2023) for Recheck  **30 min please**.  Patient was given instructions and counseling regarding her condition or for health maintenance advice. Please see specific information pulled into the AVS if appropriate.

## 2023-01-14 LAB
ALBUMIN SERPL-MCNC: 4.6 G/DL (ref 3.5–5.2)
ALBUMIN/GLOB SERPL: 1.9 G/DL
ALP SERPL-CCNC: 136 U/L (ref 39–117)
ALT SERPL W P-5'-P-CCNC: 21 U/L (ref 1–33)
ANION GAP SERPL CALCULATED.3IONS-SCNC: 10 MMOL/L (ref 5–15)
AST SERPL-CCNC: 32 U/L (ref 1–32)
BILIRUB SERPL-MCNC: 0.3 MG/DL (ref 0–1.2)
BUN SERPL-MCNC: 14 MG/DL (ref 6–20)
BUN/CREAT SERPL: 14.1 (ref 7–25)
CALCIUM SPEC-SCNC: 9.8 MG/DL (ref 8.6–10.5)
CHLORIDE SERPL-SCNC: 101 MMOL/L (ref 98–107)
CO2 SERPL-SCNC: 30 MMOL/L (ref 22–29)
CREAT SERPL-MCNC: 0.99 MG/DL (ref 0.57–1)
EGFRCR SERPLBLD CKD-EPI 2021: 65.8 ML/MIN/1.73
FERRITIN SERPL-MCNC: 323 NG/ML (ref 13–150)
GLOBULIN UR ELPH-MCNC: 2.4 GM/DL
GLUCOSE SERPL-MCNC: 92 MG/DL (ref 65–99)
HBA1C MFR BLD: 5.4 % (ref 4.8–5.6)
IRON 24H UR-MRATE: 124 MCG/DL (ref 37–145)
IRON SATN MFR SERPL: 31 % (ref 20–50)
POTASSIUM SERPL-SCNC: 4.2 MMOL/L (ref 3.5–5.2)
PROT SERPL-MCNC: 7 G/DL (ref 6–8.5)
SODIUM SERPL-SCNC: 141 MMOL/L (ref 136–145)
TIBC SERPL-MCNC: 404 MCG/DL (ref 298–536)
TRANSFERRIN SERPL-MCNC: 271 MG/DL (ref 200–360)
TSH SERPL DL<=0.05 MIU/L-ACNC: 2.88 UIU/ML (ref 0.27–4.2)

## 2023-01-20 ENCOUNTER — LAB (OUTPATIENT)
Dept: LAB | Facility: HOSPITAL | Age: 58
End: 2023-01-20
Payer: COMMERCIAL

## 2023-01-20 DIAGNOSIS — R79.89 ELEVATED FERRITIN: ICD-10-CM

## 2023-01-20 PROCEDURE — 84466 ASSAY OF TRANSFERRIN: CPT

## 2023-01-20 PROCEDURE — 36415 COLL VENOUS BLD VENIPUNCTURE: CPT

## 2023-01-20 PROCEDURE — 83540 ASSAY OF IRON: CPT

## 2023-01-24 RX ORDER — LEVOTHYROXINE SODIUM 112 UG/1
TABLET ORAL
Qty: 90 TABLET | Refills: 0 | Status: SHIPPED | OUTPATIENT
Start: 2023-01-24

## 2023-01-27 LAB
IRON SATN MFR SERPL: 30 % SATURATION
IRON SERPL-MCNC: 115 UG/DL
TRANSFERRIN SERPL-MCNC: 270 MG/DL

## 2023-02-04 DIAGNOSIS — E78.2 MIXED HYPERLIPIDEMIA: ICD-10-CM

## 2023-02-06 RX ORDER — PRAVASTATIN SODIUM 20 MG
TABLET ORAL
Qty: 90 TABLET | Refills: 0 | Status: SHIPPED | OUTPATIENT
Start: 2023-02-06

## 2023-02-07 ENCOUNTER — OFFICE VISIT (OUTPATIENT)
Dept: CARDIOLOGY | Facility: CLINIC | Age: 58
End: 2023-02-07
Payer: COMMERCIAL

## 2023-02-07 ENCOUNTER — CLINICAL SUPPORT NO REQUIREMENTS (OUTPATIENT)
Dept: CARDIOLOGY | Facility: CLINIC | Age: 58
End: 2023-02-07
Payer: COMMERCIAL

## 2023-02-07 VITALS
SYSTOLIC BLOOD PRESSURE: 96 MMHG | BODY MASS INDEX: 28.25 KG/M2 | HEIGHT: 67 IN | DIASTOLIC BLOOD PRESSURE: 71 MMHG | WEIGHT: 180 LBS | HEART RATE: 85 BPM

## 2023-02-07 DIAGNOSIS — I49.5 SSS (SICK SINUS SYNDROME): ICD-10-CM

## 2023-02-07 DIAGNOSIS — Z95.0 PRESENCE OF CARDIAC PACEMAKER: Primary | ICD-10-CM

## 2023-02-07 DIAGNOSIS — Z95.0 CARDIAC PACEMAKER: Primary | ICD-10-CM

## 2023-02-07 DIAGNOSIS — I71.20 THORACIC AORTIC ANEURYSM WITHOUT RUPTURE: ICD-10-CM

## 2023-02-07 DIAGNOSIS — I10 ESSENTIAL HYPERTENSION: ICD-10-CM

## 2023-02-07 DIAGNOSIS — Z95.0 PRESENCE OF CARDIAC PACEMAKER: ICD-10-CM

## 2023-02-07 PROCEDURE — 99214 OFFICE O/P EST MOD 30 MIN: CPT | Performed by: INTERNAL MEDICINE

## 2023-02-07 PROCEDURE — 93280 PM DEVICE PROGR EVAL DUAL: CPT | Performed by: INTERNAL MEDICINE

## 2023-02-07 RX ORDER — TORSEMIDE 20 MG/1
20 TABLET ORAL DAILY
Qty: 90 TABLET | Refills: 3 | Status: SHIPPED | OUTPATIENT
Start: 2023-02-07

## 2023-02-07 RX ORDER — IRBESARTAN 75 MG/1
150 TABLET ORAL DAILY
Qty: 90 TABLET | Refills: 2 | Status: SHIPPED | OUTPATIENT
Start: 2023-02-07

## 2023-02-07 RX ORDER — METOPROLOL SUCCINATE 25 MG/1
25 TABLET, EXTENDED RELEASE ORAL DAILY
Qty: 90 TABLET | Refills: 3 | Status: SHIPPED | OUTPATIENT
Start: 2023-02-07

## 2023-02-07 NOTE — PROGRESS NOTES
Normal Dual Chamber Pacemaker Device Interrogation and Device Testing.  Normal evaluation of device function and lead measurements.  No optimization was needed of parameters or maximization of device longevity.  Patient is on manual downloads and will not send them in, I have her coming back in three months for a pacemaker battery check.

## 2023-02-10 RX ORDER — POTASSIUM CHLORIDE 1500 MG/1
TABLET, FILM COATED, EXTENDED RELEASE ORAL
Qty: 30 TABLET | Refills: 3 | Status: SHIPPED | OUTPATIENT
Start: 2023-02-10

## 2023-02-17 NOTE — ASSESSMENT & PLAN NOTE
Blood pressure is borderline low previously well controlled.  Patient denies any dizziness.  Continue irbesartan metoprolol the current dose.  Continue diuretics as well.  Recent labs showed normal creatinine and electrolytes.

## 2023-02-17 NOTE — ASSESSMENT & PLAN NOTE
Pacemaker interrogated in the office today.  Battery longevity is approximately 8 months.  87% atrial pacing.  Lead parameters are appropriate.  We will bring the patient back to office in 3 months for interrogation since we are nearing end-of-life battery.

## 2023-02-17 NOTE — PROGRESS NOTES
CARDIOLOGY FOLLOW-UP PROGRESS NOTE        Chief Complaint  Pacemaker Check, Follow-up, Hypertension, and Hyperlipidemia    Subjective            Lucina Payan presents to [facility]  History of Present Illness      Ms Payan is here for routine 6-month follow-up visit and pacemaker interrogation.  She denies any new complaints at this time.  Respiratory denies any chest pain, shortness of breath, palpitations or dizziness.  She is taking all the medications as prescribed.       Past History:      1) Status post permanent pacemaker placement for bradycardia in ; 2) Ascending aortic aneurysm; 3) Hypertension; 4) Hyperlipidemia.    Medical History:  Past Medical History:   Diagnosis Date   • Cardiac pacemaker    • Depression    • GERD (gastroesophageal reflux disease)    • Hyperlipidemia    • Hypertension    • Hypothyroidism    • Thoracic aortic aneurysm        Surgical History: has a past surgical history that includes Appendectomy; Cholecystectomy; tonsillectomy and adenoidectomy; postpartum tubal ligation;  section; Gastric bypass; and Cardiac pacemaker placement.     Family History: family history includes Hypertension in her father and mother; Liver cancer in her mother.     Social History: reports that she has been smoking cigarettes. She has been smoking an average of 1.5 packs per day. She has never used smokeless tobacco. She reports that she does not currently use alcohol. She reports that she does not use drugs.    Allergies: Demerol [meperidine], Morphine, and Silver    Current Outpatient Medications on File Prior to Visit   Medication Sig   • buPROPion XL (WELLBUTRIN XL) 300 MG 24 hr tablet TAKE ONE TABLET BY MOUTH DAILY   • diphenhydrAMINE (BENADRYL) 50 MG capsule Take 50 mg by mouth. Once nightly as needed   • famotidine (PEPCID) 40 MG tablet TAKE ONE TABLET BY MOUTH DAILY   • ferrous sulfate (FeroSul) 325 (65 FE) MG tablet Every other day   • ibuprofen  "(ADVIL,MOTRIN) 600 MG tablet Take 600 mg by mouth As Needed. One tablet tid prn   • KLOR-CON 20 MEQ CR tablet TAKE ONE TABLET BY MOUTH DAILY   • levothyroxine (SYNTHROID, LEVOTHROID) 112 MCG tablet TAKE ONE TABLET BY MOUTH EVERY MORNING   • multivitamin with minerals tablet tablet Take 1 tablet by mouth Daily.   • pravastatin (PRAVACHOL) 20 MG tablet TAKE ONE TABLET BY MOUTH DAILY   • sertraline (ZOLOFT) 50 MG tablet Take 3 tablets by mouth Daily.   • traZODone (DESYREL) 100 MG tablet TAKE ONE TABLET BY MOUTH EVERY NIGHT AT BEDTIME     No current facility-administered medications on file prior to visit.          Review of Systems   Respiratory: Negative for cough, shortness of breath and wheezing.    Cardiovascular: Negative for chest pain, palpitations and leg swelling.   Gastrointestinal: Negative for nausea and vomiting.   Neurological: Negative for dizziness and syncope.        Objective     BP 96/71   Pulse 85   Ht 170.2 cm (67.01\")   Wt 81.6 kg (180 lb)   BMI 28.18 kg/m²       Physical Exam    General : Alert, awake, no acute distress  Neck : Supple, no carotid bruit, no jugular venous distention  CVS : Regular rate and rhythm, no murmur, rubs or gallops  Lungs: Clear to auscultation bilaterally, no crackles or rhonchi  Abdomen: Soft, nontender, bowel sounds heard in all 4 quadrants  Extremities: Warm, well-perfused, no pedal edema    Result Review :     The following data was reviewed by: Yayo Light MD on 02/07/2023:    CMP    CMP 4/15/22 9/30/22 1/13/23   Glucose 90 85 92   BUN 15 13 14   Creatinine 0.88 0.85 0.99   eGFR 76.3 79.5 65.8   Sodium 139 140 141   Potassium 4.4 4.2 4.2   Chloride 102 100 101   Calcium 9.6 9.4 9.8   Total Protein 6.8 6.6 7.0   Albumin 4.40 4.30 4.6   Globulin 2.4 2.3 2.4   Total Bilirubin 0.3 0.4 0.3   Alkaline Phosphatase 116 119 (A) 136 (A)   AST (SGOT) 30 30 32   ALT (SGPT) 20 20 21   Albumin/Globulin Ratio 1.8 1.9 1.9   BUN/Creatinine Ratio 17.0 15.3 14.1   Anion Gap " 9.0 7.5 10.0   (A) Abnormal value       Comments are available for some flowsheets but are not being displayed.           CBC    CBC 1/13/23   WBC 6.88   RBC 4.82   Hemoglobin 15.3   Hematocrit 45.7   MCV 94.8   MCH 31.7   MCHC 33.5   RDW 12.0 (A)   Platelets 231   (A) Abnormal value            TSH    TSH 4/15/22 9/30/22 1/13/23   TSH 1.990 3.910 2.880           Lipid Panel    Lipid Panel 4/15/22 9/30/22   Total Cholesterol 170 169   Triglycerides 110 89   HDL Cholesterol 64 (A) 71 (A)   VLDL Cholesterol 20 16   LDL Cholesterol  86 82   LDL/HDL Ratio 1.31 1.13   (A) Abnormal value                 Data reviewed: Cardiology studies        Results for orders placed during the hospital encounter of 04/28/22    Adult Transthoracic Echo Complete W/ Cont if Necessary Per Protocol    Interpretation Summary  Normal left ventricular systolic function.  Appears to be pacemaker lead in the right ventricle.  Trace MR.  Mild TR.  Trace AI.  Aortic root normal size.  Aorta appears to be enlarged.                   Assessment and Plan        Diagnoses and all orders for this visit:    1. Presence of cardiac pacemaker (Primary)  Assessment & Plan:  Pacemaker interrogated in the office today.  Battery longevity is approximately 8 months.  87% atrial pacing.  Lead parameters are appropriate.  We will bring the patient back to office in 3 months for interrogation since we are nearing end-of-life battery.      2. Essential hypertension  Assessment & Plan:  Blood pressure is borderline low previously well controlled.  Patient denies any dizziness.  Continue irbesartan metoprolol the current dose.  Continue diuretics as well.  Recent labs showed normal creatinine and electrolytes.    Orders:  -     metoprolol succinate XL (TOPROL-XL) 25 MG 24 hr tablet; Take 1 tablet by mouth Daily.  Dispense: 90 tablet; Refill: 3  -     irbesartan (AVAPRO) 75 MG tablet; Take 2 tablets by mouth Daily.  Dispense: 90 tablet; Refill: 2    3. Thoracic aortic  aneurysm without rupture  Assessment & Plan:  Currently following up with cardiothoracic surgeons.  Blood pressures well controlled.    Orders:  -     irbesartan (AVAPRO) 75 MG tablet; Take 2 tablets by mouth Daily.  Dispense: 90 tablet; Refill: 2    Other orders  -     torsemide (DEMADEX) 20 MG tablet; Take 1 tablet by mouth Daily.  Dispense: 90 tablet; Refill: 3            Follow Up     Return in about 3 months (around 5/7/2023) for Device check ONLY IN 3 MONTHS. F/U IN 6 MONTHS AT [facility].    Patient was given instructions and counseling regarding her condition or for health maintenance advice. Please see specific information pulled into the AVS if appropriate.

## 2023-03-06 RX ORDER — BUPROPION HYDROCHLORIDE 300 MG/1
TABLET ORAL
Qty: 90 TABLET | Refills: 0 | Status: SHIPPED | OUTPATIENT
Start: 2023-03-06

## 2023-03-10 ENCOUNTER — OFFICE VISIT (OUTPATIENT)
Dept: FAMILY MEDICINE CLINIC | Age: 58
End: 2023-03-10
Payer: COMMERCIAL

## 2023-03-10 ENCOUNTER — TRANSCRIBE ORDERS (OUTPATIENT)
Dept: ADMINISTRATIVE | Facility: HOSPITAL | Age: 58
End: 2023-03-10
Payer: COMMERCIAL

## 2023-03-10 VITALS
HEIGHT: 67 IN | BODY MASS INDEX: 26.53 KG/M2 | WEIGHT: 169 LBS | DIASTOLIC BLOOD PRESSURE: 68 MMHG | OXYGEN SATURATION: 98 % | HEART RATE: 83 BPM | SYSTOLIC BLOOD PRESSURE: 113 MMHG

## 2023-03-10 DIAGNOSIS — I49.5 SSS (SICK SINUS SYNDROME): ICD-10-CM

## 2023-03-10 DIAGNOSIS — I71.21 ASCENDING AORTIC ANEURYSM, UNSPECIFIED WHETHER RUPTURED: Primary | ICD-10-CM

## 2023-03-10 DIAGNOSIS — F33.1 MODERATE EPISODE OF RECURRENT MAJOR DEPRESSIVE DISORDER: Primary | ICD-10-CM

## 2023-03-10 DIAGNOSIS — F43.21 GRIEF REACTION: ICD-10-CM

## 2023-03-10 DIAGNOSIS — I10 ESSENTIAL HYPERTENSION: ICD-10-CM

## 2023-03-10 PROCEDURE — 99214 OFFICE O/P EST MOD 30 MIN: CPT | Performed by: FAMILY MEDICINE

## 2023-03-10 NOTE — PROGRESS NOTES
Chief Complaint  Depression (2 month follow up)     37 minutes were spent in this encounter, including the face-to-face encounter, chart review, documentation, and coordination of care.    Subjective          Luicna Payan presents to [facility] today for routine follow-up on chronic issues.    She has a new furbaby, Ramone Salyong Payan (denise retriever).      She is on bupropion and sertraline for depression.  She is on trazodone for insomnia.  Abilify caused weight gain.  She has received a lot of benefit from going to the Hospice support group.     She is on irbesartan and metoprolol succinate for  hypertension.  She is on torsemide and K+ for pedal edema. She is following with Dr. Light Cardiology.  S/p pacemaker placement for bradycardia and sick sinus syndrome.  Due for a battery change over a summer.  No chest pain, palpitations, or shortness of breath.  Her blood pressure has been well controlled.  Follows with Dr. Velasco at [facility] for her thoracic aortic aneurysm.  She is on pravastatin for hyperlipidemia.  Denies myalgias.      She is on levothyroxine for hypothyroidism.  Denies cold/heat intolerance or changes in hair or skin.       She has h/o gastric bypass.      Current Outpatient Medications:   •  buPROPion XL (WELLBUTRIN XL) 300 MG 24 hr tablet, TAKE ONE TABLET BY MOUTH DAILY, Disp: 90 tablet, Rfl: 0  •  diphenhydrAMINE (BENADRYL) 50 MG capsule, Take 1 capsule by mouth. Once nightly as needed, Disp: , Rfl:   •  famotidine (PEPCID) 40 MG tablet, TAKE ONE TABLET BY MOUTH DAILY, Disp: 90 tablet, Rfl: 0  •  ferrous sulfate (FeroSul) 325 (65 FE) MG tablet, Every other day, Disp: 90 tablet, Rfl: 1  •  ibuprofen (ADVIL,MOTRIN) 600 MG tablet, Take 1 tablet by mouth As Needed. One tablet tid prn, Disp: , Rfl:   •  irbesartan (AVAPRO) 75 MG tablet, Take 2 tablets by mouth Daily., Disp: 90 tablet, Rfl: 2  •  KLOR-CON 20 MEQ CR tablet, TAKE ONE TABLET BY MOUTH DAILY, Disp: 90  "tablet, Rfl: 1  •  levothyroxine (SYNTHROID, LEVOTHROID) 112 MCG tablet, TAKE ONE TABLET BY MOUTH EVERY MORNING, Disp: 90 tablet, Rfl: 0  •  metoprolol succinate XL (TOPROL-XL) 25 MG 24 hr tablet, Take 1 tablet by mouth Daily., Disp: 90 tablet, Rfl: 3  •  multivitamin with minerals tablet tablet, Take 1 tablet by mouth Daily., Disp: , Rfl:   •  potassium chloride ER (K-TAB) 20 MEQ tablet controlled-release ER tablet, TAKE ONE TABLET BY MOUTH DAILY, Disp: 30 tablet, Rfl: 3  •  pravastatin (PRAVACHOL) 20 MG tablet, TAKE ONE TABLET BY MOUTH DAILY, Disp: 90 tablet, Rfl: 0  •  sertraline (ZOLOFT) 50 MG tablet, Take 3 tablets by mouth Daily., Disp: 90 tablet, Rfl: 2  •  torsemide (DEMADEX) 20 MG tablet, Take 1 tablet by mouth Daily., Disp: 90 tablet, Rfl: 3  •  traZODone (DESYREL) 100 MG tablet, TAKE ONE TABLET BY MOUTH EVERY NIGHT AT BEDTIME, Disp: 90 tablet, Rfl: 1    Allergies:  Demerol [meperidine], Morphine, and Silver      Objective   Vital Signs:   Vitals:    03/10/23 1025   BP: 113/68   BP Location: Left arm   Patient Position: Sitting   Cuff Size: Adult   Pulse: 83   SpO2: 98%   Weight: 76.7 kg (169 lb)   Height: 170.2 cm (67.01\")       Physical Exam  Vitals reviewed.   Constitutional:       General: She is not in acute distress.     Appearance: Normal appearance. She is well-developed.   HENT:      Head: Normocephalic and atraumatic.      Right Ear: External ear normal.      Left Ear: External ear normal.   Eyes:      Extraocular Movements: Extraocular movements intact.      Conjunctiva/sclera: Conjunctivae normal.      Pupils: Pupils are equal, round, and reactive to light.   Cardiovascular:      Rate and Rhythm: Normal rate and regular rhythm.      Heart sounds: No murmur heard.  Pulmonary:      Effort: Pulmonary effort is normal.      Breath sounds: Normal breath sounds. No wheezing, rhonchi or rales.   Abdominal:      General: Bowel sounds are normal. There is no distension.      Palpations: Abdomen is soft. "      Tenderness: There is no abdominal tenderness.   Musculoskeletal:         General: Normal range of motion.   Neurological:      Mental Status: She is alert.   Psychiatric:         Mood and Affect: Affect normal.          Lab Results   Component Value Date    GLUCOSE 92 01/13/2023    BUN 14 01/13/2023    CREATININE 0.99 01/13/2023    EGFRIFNONA 76 10/15/2021    BCR 14.1 01/13/2023    K 4.2 01/13/2023    CO2 30.0 (H) 01/13/2023    CALCIUM 9.8 01/13/2023    ALBUMIN 4.6 01/13/2023    LABIL2 1.4 03/18/2021    AST 32 01/13/2023    ALT 21 01/13/2023       Lab Results   Component Value Date    CHOL 169 09/30/2022    CHLPL 161 03/18/2021    TRIG 89 09/30/2022    HDL 71 (H) 09/30/2022    LDL 82 09/30/2022            Assessment and Plan    Diagnoses and all orders for this visit:    1. Moderate episode of recurrent major depressive disorder (HCC) (Primary)  Assessment & Plan:  Lucina continues to deeply grieve the loss of her beloved  Hayder to presumed complication of opiate dependence, now 11 months ago.  The loss was very unexpected.  She is currently on Wellbutrin 300 mg daily, sertraline 150 mg daily and trazodone 100 mg nightly.  However, the medications are giving her incomplete control.  She has found some support from the Hospice grief support group.  She does not unfortunately have good family support (being estranged from her daughters), although she has a strong network of friends and neighbors who have been helping her during this difficult time.  I think it is time, and she agrees, that we get her in with a psychiatrist for further assistance with the med management.  I am going to place a referral to [facility].  I will continue to see Lucina every 2 months as we have been doing to provide additional support.    Orders:  -     Ambulatory Referral to Psychiatry    2. Grief reaction  -     Ambulatory Referral to Psychiatry    3. SSS (sick sinus syndrome) (HCC)  Overview:  Following with  Cardiology.  Status post pacemaker placement.    Assessment & Plan:  Pacemaker is nearing the end of its battery life.  She is following with Dr. Light and he is planning to see her back in a few months so that they can arrange for a battery replacement.      4. Essential hypertension  Assessment & Plan:  BP at goal.  Continue to monitor.          Follow Up   Return in about 2 months (around 5/10/2023) for Recheck **30 min please**.  Patient was given instructions and counseling regarding her condition or for health maintenance advice. Please see specific information pulled into the AVS if appropriate.

## 2023-03-10 NOTE — ASSESSMENT & PLAN NOTE
Pacemaker is nearing the end of its battery life.  She is following with Dr. Light and he is planning to see her back in a few months so that they can arrange for a battery replacement.

## 2023-03-10 NOTE — ASSESSMENT & PLAN NOTE
Lucina continues to deeply grieve the loss of her beloved  Hayder to presumed complication of opiate dependence, now 11 months ago.  The loss was very unexpected.  She is currently on Wellbutrin 300 mg daily, sertraline 150 mg daily and trazodone 100 mg nightly.  However, the medications are giving her incomplete control.  She has found some support from the Hospice grief support group.  She does not unfortunately have good family support (being estranged from her daughters), although she has a strong network of friends and neighbors who have been helping her during this difficult time.  I think it is time, and she agrees, that we get her in with a psychiatrist for further assistance with the med management.  I am going to place a referral to [facility].  I will continue to see Lucina every 2 months as we have been doing to provide additional support.

## 2023-04-02 DIAGNOSIS — F33.1 MODERATE EPISODE OF RECURRENT MAJOR DEPRESSIVE DISORDER: ICD-10-CM

## 2023-04-03 RX ORDER — FAMOTIDINE 40 MG/1
TABLET, FILM COATED ORAL
Qty: 90 TABLET | Refills: 1 | Status: SHIPPED | OUTPATIENT
Start: 2023-04-03

## 2023-05-01 RX ORDER — LEVOTHYROXINE SODIUM 112 UG/1
TABLET ORAL
Qty: 90 TABLET | Refills: 0 | Status: SHIPPED | OUTPATIENT
Start: 2023-05-01

## 2023-05-06 DIAGNOSIS — E78.2 MIXED HYPERLIPIDEMIA: ICD-10-CM

## 2023-05-08 ENCOUNTER — HOSPITAL ENCOUNTER (OUTPATIENT)
Dept: CT IMAGING | Facility: HOSPITAL | Age: 58
Discharge: HOME | End: 2023-05-08
Admitting: THORACIC SURGERY (CARDIOTHORACIC VASCULAR SURGERY)
Payer: COMMERCIAL

## 2023-05-08 DIAGNOSIS — I71.21 ASCENDING AORTIC ANEURYSM, UNSPECIFIED WHETHER RUPTURED: ICD-10-CM

## 2023-05-08 PROCEDURE — 71250 CT THORAX DX C-: CPT

## 2023-05-08 RX ORDER — PRAVASTATIN SODIUM 20 MG
TABLET ORAL
Qty: 90 TABLET | Refills: 3 | Status: SHIPPED | OUTPATIENT
Start: 2023-05-08

## 2023-05-09 ENCOUNTER — CLINICAL SUPPORT NO REQUIREMENTS (OUTPATIENT)
Dept: CARDIOLOGY | Facility: CLINIC | Age: 58
End: 2023-05-09
Payer: COMMERCIAL

## 2023-05-09 DIAGNOSIS — I49.5 SSS (SICK SINUS SYNDROME): ICD-10-CM

## 2023-05-09 DIAGNOSIS — Z95.0 PRESENCE OF CARDIAC PACEMAKER: Primary | ICD-10-CM

## 2023-05-09 NOTE — PROGRESS NOTES
Normal dual chamber Medtronic pacemaker interrogation and testing, we are keeping an eye on battery voltage.  She will send in a download each month and I have her scheduled for a clinic appointment in July.

## 2023-05-18 ENCOUNTER — TRANSCRIBE ORDERS (OUTPATIENT)
Dept: ADMINISTRATIVE | Facility: HOSPITAL | Age: 58
End: 2023-05-18
Payer: COMMERCIAL

## 2023-05-18 DIAGNOSIS — I71.20 AORTIC ANEURYSM, INTRATHORACIC: Primary | ICD-10-CM

## 2023-05-31 DIAGNOSIS — F33.1 MODERATE EPISODE OF RECURRENT MAJOR DEPRESSIVE DISORDER: ICD-10-CM

## 2023-06-05 ENCOUNTER — TELEPHONE (OUTPATIENT)
Dept: FAMILY MEDICINE CLINIC | Age: 58
End: 2023-06-05
Payer: COMMERCIAL

## 2023-06-11 DIAGNOSIS — F33.1 MODERATE EPISODE OF RECURRENT MAJOR DEPRESSIVE DISORDER: ICD-10-CM

## 2023-06-12 RX ORDER — BUPROPION HYDROCHLORIDE 300 MG/1
300 TABLET ORAL DAILY
Qty: 90 TABLET | Refills: 1 | Status: SHIPPED | OUTPATIENT
Start: 2023-06-12

## 2023-06-12 RX ORDER — TRAZODONE HYDROCHLORIDE 100 MG/1
100 TABLET ORAL
Qty: 90 TABLET | Refills: 1 | Status: SHIPPED | OUTPATIENT
Start: 2023-06-12

## 2023-06-16 ENCOUNTER — PREP FOR SURGERY (OUTPATIENT)
Dept: CARDIOLOGY | Facility: CLINIC | Age: 58
End: 2023-06-16
Payer: COMMERCIAL

## 2023-06-16 DIAGNOSIS — I71.21 ANEURYSM OF ASCENDING AORTA WITHOUT RUPTURE: Primary | ICD-10-CM

## 2023-06-16 RX ORDER — SODIUM CHLORIDE 0.9 % (FLUSH) 0.9 %
10 SYRINGE (ML) INJECTION EVERY 12 HOURS SCHEDULED
OUTPATIENT
Start: 2023-06-16

## 2023-06-16 RX ORDER — NITROGLYCERIN 0.4 MG/1
0.4 TABLET SUBLINGUAL
OUTPATIENT
Start: 2023-06-16

## 2023-06-16 RX ORDER — SODIUM CHLORIDE 9 MG/ML
40 INJECTION, SOLUTION INTRAVENOUS AS NEEDED
OUTPATIENT
Start: 2023-06-16

## 2023-06-16 RX ORDER — SODIUM CHLORIDE 0.9 % (FLUSH) 0.9 %
10 SYRINGE (ML) INJECTION AS NEEDED
OUTPATIENT
Start: 2023-06-16

## 2023-06-16 RX ORDER — SODIUM CHLORIDE 9 MG/ML
75 INJECTION, SOLUTION INTRAVENOUS CONTINUOUS
OUTPATIENT
Start: 2023-06-16

## 2023-06-19 ENCOUNTER — TELEPHONE (OUTPATIENT)
Dept: CARDIOLOGY | Facility: CLINIC | Age: 58
End: 2023-06-19
Payer: COMMERCIAL

## 2023-06-19 RX ORDER — POTASSIUM CHLORIDE 1500 MG/1
TABLET, EXTENDED RELEASE ORAL
Qty: 30 TABLET | Refills: 3 | Status: SHIPPED | OUTPATIENT
Start: 2023-06-19

## 2023-06-19 NOTE — TELEPHONE ENCOUNTER
I spoke to patient and gave an arrival time of 7:00 on 06/20/23 for [facility]. Patient was instructed to have a  for the day of the procedure and to arrive at the main entrance/registration area. Patient was educated about stent placement and informed that in the event of stent placement, the patient will be required to stay overnight for observation. Patient was instructed to continue all medications as usual. Patient was instructed to be NPO after midnight with sips of water as needed. Patient is agreeable with no other questions or concerns.

## 2023-07-31 RX ORDER — LEVOTHYROXINE SODIUM 112 UG/1
112 TABLET ORAL EVERY MORNING
Qty: 90 TABLET | Refills: 0 | Status: SHIPPED | OUTPATIENT
Start: 2023-07-31

## 2023-08-04 ENCOUNTER — TELEPHONE (OUTPATIENT)
Dept: CARDIOLOGY | Facility: CLINIC | Age: 58
End: 2023-08-04
Payer: COMMERCIAL

## 2023-08-04 DIAGNOSIS — F33.1 MODERATE EPISODE OF RECURRENT MAJOR DEPRESSIVE DISORDER: ICD-10-CM

## 2023-08-07 ENCOUNTER — TELEPHONE (OUTPATIENT)
Dept: CARDIOLOGY | Facility: CLINIC | Age: 58
End: 2023-08-07

## 2023-08-07 RX ORDER — FAMOTIDINE 40 MG/1
TABLET, FILM COATED ORAL
Qty: 90 TABLET | Refills: 1 | Status: SHIPPED | OUTPATIENT
Start: 2023-08-07

## 2023-08-07 NOTE — TELEPHONE ENCOUNTER
The [facility] received a fax that requires your attention. The document has been indexed to the patient’s chart for your review.      Reason for sending: EXTERNAL MEDICAL RECORD NOTIFICATION     Documents Description: H&P CONSULT NOTE    Name of Sender: Psychiatric     Date Indexed: 8.7.23

## 2023-08-08 ENCOUNTER — TELEPHONE (OUTPATIENT)
Dept: CARDIOLOGY | Facility: CLINIC | Age: 58
End: 2023-08-08

## 2023-08-08 NOTE — TELEPHONE ENCOUNTER
The [facility] received a fax that requires your attention. The document has been indexed to the patient’s chart for your review.      Reason for sending: EXTERNAL MEDICAL RECORD NOTIFICATION     Documents Description: DISCHARGE FACE SHEET     Name of Sender: [facility]     Date Indexed: 8.7.23

## 2023-08-18 ENCOUNTER — OFFICE VISIT (OUTPATIENT)
Dept: CARDIOLOGY | Facility: CLINIC | Age: 58
End: 2023-08-18
Payer: COMMERCIAL

## 2023-08-18 VITALS
HEIGHT: 67 IN | DIASTOLIC BLOOD PRESSURE: 66 MMHG | WEIGHT: 163.4 LBS | BODY MASS INDEX: 25.65 KG/M2 | HEART RATE: 81 BPM | SYSTOLIC BLOOD PRESSURE: 111 MMHG

## 2023-08-18 DIAGNOSIS — Z95.0 PRESENCE OF CARDIAC PACEMAKER: Primary | ICD-10-CM

## 2023-08-18 DIAGNOSIS — I10 ESSENTIAL HYPERTENSION: ICD-10-CM

## 2023-08-18 DIAGNOSIS — Z86.79 S/P ASCENDING AORTIC ANEURYSM REPAIR: ICD-10-CM

## 2023-08-18 DIAGNOSIS — Z98.890 S/P ASCENDING AORTIC ANEURYSM REPAIR: ICD-10-CM

## 2023-08-18 RX ORDER — ATORVASTATIN CALCIUM 80 MG/1
TABLET, FILM COATED ORAL
COMMUNITY
Start: 2023-08-07

## 2023-08-18 NOTE — PROGRESS NOTES
Chief Complaint  Hospital Follow Up Visit    Subjective        History of Present Illness  Lucina Payan presents to [facility]   Ms. Payan is a 57-year-old female patient coming in today for follow-up after recent hospitalization for surgery to her ascending aorta aneurysm.  On August 2 she underwent replacement of the ascending aorta with a 26 mm graft by Dr. Velasco.  Her postoperative course was unremarkable.  Prior to her surgery she was noted to be at [facility] on her pacemaker.  Battery replacement was deferred so she could go ahead with her aneurysm surgery.  She did have a repeat interrogation indicating 4 months left of battery life during her hospitalization.  She reports overall she is doing well since surgery, she has some normal postoperative sternotomy incision line pain but it is healing well.  She she does feel some fatigue since her surgery, but is tolerating daily activity without any chest pain/pressure, shortness of breath or palpitations.    Past History:       1) Status post permanent pacemaker placement for bradycardia in 2013; 2) Ascending aortic aneurysm-status post repair with graft by Dr. Velasco 8/2/2023; 3) Hypertension; 4) Hyperlipidemia    Past Medical History:   Diagnosis Date    Allergic Seasonal    Arthritis 2021    Bradycardia     Cardiac pacemaker     Cholelithiasis Removed 2003    Depression     GERD (gastroesophageal reflux disease)     Heart murmur 1983    HL (hearing loss) 2021    Hyperlipidemia     Hypertension     Hypothyroidism     Obesity 1974    Pancreatitis 2003    Seizure     Thoracic aortic aneurysm     Urinary tract infection Several    Visual impairment Wear bifocals       Allergies   Allergen Reactions    Demerol [Meperidine] Seizure    Morphine Seizure    Silver Other (See Comments)     Jimy silver - can be a base for certain creams -  Skin turns black         Past Surgical History:   Procedure Laterality Date    ADENOIDECTOMY  18984     APPENDECTOMY      BARIATRIC SURGERY      CARDIAC CATHETERIZATION      CARDIAC CATHETERIZATION N/A 2023    Procedure: Left Heart Cath;  Surgeon: Yayo Light MD;  Location: [facility] CATH INVASIVE LOCATION;  Service: Cardiovascular;  Laterality: N/A;    CARDIAC PACEMAKER PLACEMENT      Medtronic     SECTION      x2    CHOLECYSTECTOMY      COLONOSCOPY      GASTRIC BYPASS      LYMPH NODE BIOPSY      POSTPARTUM TUBAL LIGATION      TONSILLECTOMY  1984    TONSILLECTOMY AND ADENOIDECTOMY      TUBAL ABDOMINAL LIGATION          Social History  She  reports that she has been smoking cigarettes. She started smoking about 2 years ago. She has a 3.00 pack-year smoking history. She has never used smokeless tobacco. She reports current alcohol use of about 1.0 standard drink per week. She reports that she does not use drugs.    Family History  Her family history includes Anxiety disorder in her sister; Arthritis in her father and mother; Bipolar disorder in her sister; Birth defects in her daughter; Cancer in her father and mother; Dementia in her father; Depression in her mother and sister; Diabetes in her daughter; Heart disease in her mother; Hyperlipidemia in her father and mother; Hypertension in her father and mother; Kidney disease in her mother; Learning disabilities in her daughter; Liver cancer in her mother; Liver disease in her mother; Mental illness in her brother, mother, and sister; Miscarriages / Stillbirths in her daughter and sister; Schizophrenia in her sister; Suicide Attempts in her mother; Thyroid disease in her daughter and mother; Vision loss in her maternal grandmother.       Current Outpatient Medications on File Prior to Visit   Medication Sig    atorvastatin (LIPITOR) 80 MG tablet     buPROPion XL (WELLBUTRIN XL) 300 MG 24 hr tablet Take 1 tablet by mouth Daily.    coenzyme Q10 100 MG capsule Take 1 capsule by mouth Every Night.    famotidine (PEPCID) 40 MG tablet TAKE  ONE TABLET BY MOUTH DAILY    ferrous sulfate (FeroSul) 325 (65 FE) MG tablet Every other day (Patient taking differently: Every other night)    levothyroxine (SYNTHROID, LEVOTHROID) 112 MCG tablet Take 1 tablet by mouth Every Morning.    metoprolol tartrate (LOPRESSOR) 25 MG tablet     sertraline (ZOLOFT) 50 MG tablet TAKE THREE TABLETS BY MOUTH DAILY    torsemide (DEMADEX) 20 MG tablet Take 0.5 tablets by mouth Daily. (Patient taking differently: Take 0.5 tablets by mouth Every Other Day.)    traZODone (DESYREL) 100 MG tablet Take 1 tablet by mouth every night at bedtime.    Nutritional Supplements (KETO PO) Take  by mouth Every Morning.    [DISCONTINUED] diphenhydrAMINE (BENADRYL) 50 MG capsule Take 1 capsule by mouth. Once nightly as needed    [DISCONTINUED] ibuprofen (ADVIL,MOTRIN) 600 MG tablet Take 1 tablet by mouth As Needed. One tablet tid prn    [DISCONTINUED] irbesartan (Avapro) 150 MG tablet Take 1 tablet by mouth Every Night.    [DISCONTINUED] metoprolol succinate XL (TOPROL-XL) 25 MG 24 hr tablet Take 1 tablet by mouth Daily.    [DISCONTINUED] multivitamin with minerals tablet tablet Take 1 tablet by mouth Every Night.    [DISCONTINUED] potassium chloride ER (K-TAB) 20 MEQ tablet controlled-release ER tablet TAKE ONE TABLET BY MOUTH DAILY (Patient taking differently: Every Night.)    [DISCONTINUED] pravastatin (PRAVACHOL) 20 MG tablet TAKE ONE TABLET BY MOUTH DAILY (Patient taking differently: Every Night.)     No current facility-administered medications on file prior to visit.         Review of Systems   Constitutional:  Positive for fatigue.   Respiratory:  Negative for cough, chest tightness and shortness of breath.    Cardiovascular:  Negative for chest pain, palpitations and leg swelling.   Gastrointestinal:  Negative for nausea and vomiting.   Neurological:  Negative for dizziness and syncope.      Objective   Vitals:    08/18/23 1342   BP: 111/66   Pulse: 81   Weight: 74.1 kg (163 lb 6.4 oz)  "  Height: 170.2 cm (67\")         Physical Exam  General : Alert, awake, no acute distress  Neck : Supple, no carotid bruit, no jugular venous distention  CVS : Regular rate and rhythm, no murmur, rubs or gallops  Lungs: Clear to auscultation bilaterally, no crackles or rhonchi  Abdomen: Soft, nontender, bowel sounds active  Extremities: Warm, well-perfused, no pedal edema  Skin: Well approximated sternotomy line without erythema or drainage      Result Review     The following data was reviewed by TISHA Villeda  No results found for: PROBNP  CMP          9/30/2022    08:38 1/13/2023    10:56 6/20/2023    07:55   CMP   Glucose 85  92  97    BUN 13  14  15    Creatinine 0.85  0.99  0.83    EGFR 79.5  65.8  81.3    Sodium 140  141  141    Potassium 4.2  4.2  3.0    Chloride 100  101  101    Calcium 9.4  9.8  9.5    Total Protein 6.6  7.0     Albumin 4.30  4.6     Globulin 2.3  2.4     Total Bilirubin 0.4  0.3     Alkaline Phosphatase 119  136     AST (SGOT) 30  32     ALT (SGPT) 20  21     Albumin/Globulin Ratio 1.9  1.9     BUN/Creatinine Ratio 15.3  14.1  18.1    Anion Gap 7.5  10.0  9.4      CBC w/diff          1/13/2023    10:56 6/20/2023    07:55   CBC w/Diff   WBC 6.88  7.35    RBC 4.82  4.72    Hemoglobin 15.3  15.6    Hematocrit 45.7  44.7    MCV 94.8  94.7    MCH 31.7  33.1    MCHC 33.5  34.9    RDW 12.0  12.1    Platelets 231  237    Neutrophil Rel % 62.8  64.1    Immature Granulocyte Rel % 0.1  0.1    Lymphocyte Rel % 27.6  25.9    Monocyte Rel % 8.3  7.3    Eosinophil Rel % 0.3  1.6    Basophil Rel % 0.9  1.0       Lab Results   Component Value Date    TSH 2.880 01/13/2023      Lab Results   Component Value Date    FREET4 1.6 09/10/2019      No results found for: DDIMERQUANT  Magnesium   Date Value Ref Range Status   06/20/2023 2.1 1.6 - 2.6 mg/dL Final      No results found for: DIGOXIN   No results found for: TROPONINT        Lipid Panel          9/30/2022    08:38   Lipid Panel   Total Cholesterol " 169    Triglycerides 89    HDL Cholesterol 71    VLDL Cholesterol 16    LDL Cholesterol  82    LDL/HDL Ratio 1.13        Results for orders placed during the hospital encounter of 06/20/23    Adult Transthoracic Echo Complete W/ Cont if Necessary Per Protocol    Interpretation Summary    Left ventricular systolic function is normal. Left ventricular ejection fraction appears to be 61 - 65%.    Left ventricular diastolic function is consistent with (grade I) impaired relaxation.    There are no significant valvular abnormalities.    Aneurysmal dilation of the ascending aorta is present, measuring 4.7 cm diameter.    Estimated right ventricular systolic pressure from tricuspid regurgitation is normal (<35 mmHg).             Assessment and Plan   Diagnoses and all orders for this visit:    1. Presence of cardiac pacemaker (Primary)  Assessment & Plan:  Pacemaker battery was interrogated during her recent hospitalization, and she still has 4 months of longevity.  We will go ahead and schedule her for battery change with Dr. Tamez on September 19.      2. Essential hypertension  Assessment & Plan:  Blood pressures well controlled.  Postoperatively her irbesartan dose was discontinued.  We will continue to monitor home BP, if it trends upward we can restart this.  Continue metoprolol.    Orders:  -     CBC (No Diff); Future  -     Comprehensive Metabolic Panel; Future    3. S/P ascending aortic aneurysm repair  Assessment & Plan:  She reports she is healing well with normal postoperative fatigue and incisional soreness.  Instructed to keep her routine follow-up with CT team.             Follow Up   Return in about 6 months (around 2/18/2024) for Next scheduled follow up, with Dr. Light, with Device check.   We will schedule for pacemaker battery change with Dr. Tamez on September 19.    Patient was given instructions and counseling regarding her condition or for health maintenance advice. Please see specific information  pulled into the AVS if appropriate.     Signed,  TISHA Villeda  08/18/2023     Dictated Utilizing Dragon Dictation: Please note that portions of this note were completed with a voice recognition program.  Part of this note may be an electronic transcription/translation of spoken language to printed text using the Dragon Dictation System.

## 2023-08-18 NOTE — ASSESSMENT & PLAN NOTE
Blood pressures well controlled.  Postoperatively her irbesartan dose was discontinued.  We will continue to monitor home BP, if it trends upward we can restart this.  Continue metoprolol.

## 2023-08-18 NOTE — ASSESSMENT & PLAN NOTE
Pacemaker battery was interrogated during her recent hospitalization, and she still has 4 months of longevity.  We will go ahead and schedule her for battery change with Dr. Tamez on September 19.

## 2023-08-18 NOTE — ASSESSMENT & PLAN NOTE
She reports she is healing well with normal postoperative fatigue and incisional soreness.  Instructed to keep her routine follow-up with CT team.

## 2023-08-21 ENCOUNTER — PREP FOR SURGERY (OUTPATIENT)
Dept: OTHER | Facility: HOSPITAL | Age: 58
End: 2023-08-21
Payer: COMMERCIAL

## 2023-08-21 DIAGNOSIS — Z45.010 PACEMAKER AT END OF BATTERY LIFE: Primary | ICD-10-CM

## 2023-08-21 RX ORDER — SODIUM CHLORIDE 0.9 % (FLUSH) 0.9 %
10 SYRINGE (ML) INJECTION EVERY 12 HOURS SCHEDULED
OUTPATIENT
Start: 2023-08-21

## 2023-08-21 RX ORDER — SODIUM CHLORIDE 9 MG/ML
40 INJECTION, SOLUTION INTRAVENOUS AS NEEDED
OUTPATIENT
Start: 2023-08-21

## 2023-08-21 RX ORDER — SODIUM CHLORIDE 0.9 % (FLUSH) 0.9 %
10 SYRINGE (ML) INJECTION AS NEEDED
OUTPATIENT
Start: 2023-08-21

## 2023-08-29 ENCOUNTER — TELEPHONE (OUTPATIENT)
Dept: CARDIOLOGY | Facility: CLINIC | Age: 58
End: 2023-08-29
Payer: COMMERCIAL

## 2023-08-29 NOTE — TELEPHONE ENCOUNTER
The [facility] received a fax that requires your attention. The document has been indexed to the patient's chart for your review.      Reason for sending: EXTERNAL MEDICAL RECORD NOTIFICATION     Documents Description: [facility] CARDIOVASCULAR [facility]ORACIC SURGERY-8.29.23    Name of Sender: [facility] CARDIOVASCULAR [facility] THORACIC SURGERY     Date Indexed: 8.29.23

## 2023-08-30 ENCOUNTER — TRANSCRIBE ORDERS (OUTPATIENT)
Dept: ADMINISTRATIVE | Facility: HOSPITAL | Age: 58
End: 2023-08-30
Payer: COMMERCIAL

## 2023-08-30 ENCOUNTER — LAB (OUTPATIENT)
Dept: LAB | Facility: HOSPITAL | Age: 58
End: 2023-08-30
Payer: COMMERCIAL

## 2023-08-30 ENCOUNTER — HOSPITAL ENCOUNTER (OUTPATIENT)
Dept: GENERAL RADIOLOGY | Facility: HOSPITAL | Age: 58
Discharge: HOME | End: 2023-08-30
Payer: COMMERCIAL

## 2023-08-30 DIAGNOSIS — I10 ESSENTIAL HYPERTENSION: ICD-10-CM

## 2023-08-30 DIAGNOSIS — Z09 SURGICAL FOLLOW-UP CARE: Primary | ICD-10-CM

## 2023-08-30 DIAGNOSIS — Z09 SURGICAL FOLLOW-UP CARE: ICD-10-CM

## 2023-08-30 LAB
ALBUMIN SERPL-MCNC: 3.6 G/DL (ref 3.5–5.2)
ALBUMIN/GLOB SERPL: 1.2 G/DL
ALP SERPL-CCNC: 97 U/L (ref 39–117)
ALT SERPL W P-5'-P-CCNC: 30 U/L (ref 1–33)
ANION GAP SERPL CALCULATED.3IONS-SCNC: 11.5 MMOL/L (ref 5–15)
AST SERPL-CCNC: 43 U/L (ref 1–32)
BILIRUB SERPL-MCNC: 0.4 MG/DL (ref 0–1.2)
BUN SERPL-MCNC: 8 MG/DL (ref 6–20)
BUN/CREAT SERPL: 10 (ref 7–25)
CALCIUM SPEC-SCNC: 9 MG/DL (ref 8.6–10.5)
CHLORIDE SERPL-SCNC: 99 MMOL/L (ref 98–107)
CO2 SERPL-SCNC: 31.5 MMOL/L (ref 22–29)
CREAT SERPL-MCNC: 0.8 MG/DL (ref 0.57–1)
DEPRECATED RDW RBC AUTO: 46.2 FL (ref 37–54)
EGFRCR SERPLBLD CKD-EPI 2021: 85 ML/MIN/1.73
ERYTHROCYTE [DISTWIDTH] IN BLOOD BY AUTOMATED COUNT: 13 % (ref 12.3–15.4)
GLOBULIN UR ELPH-MCNC: 3 GM/DL
GLUCOSE SERPL-MCNC: 106 MG/DL (ref 65–99)
HCT VFR BLD AUTO: 33.5 % (ref 34–46.6)
HGB BLD-MCNC: 10.7 G/DL (ref 12–15.9)
MCH RBC QN AUTO: 30.4 PG (ref 26.6–33)
MCHC RBC AUTO-ENTMCNC: 31.9 G/DL (ref 31.5–35.7)
MCV RBC AUTO: 95.2 FL (ref 79–97)
PLATELET # BLD AUTO: 386 10*3/MM3 (ref 140–450)
PMV BLD AUTO: 9.8 FL (ref 6–12)
POTASSIUM SERPL-SCNC: 3 MMOL/L (ref 3.5–5.2)
PROT SERPL-MCNC: 6.6 G/DL (ref 6–8.5)
RBC # BLD AUTO: 3.52 10*6/MM3 (ref 3.77–5.28)
SODIUM SERPL-SCNC: 142 MMOL/L (ref 136–145)
WBC NRBC COR # BLD: 8.41 10*3/MM3 (ref 3.4–10.8)

## 2023-08-30 PROCEDURE — 36415 COLL VENOUS BLD VENIPUNCTURE: CPT

## 2023-08-30 PROCEDURE — 71046 X-RAY EXAM CHEST 2 VIEWS: CPT

## 2023-08-30 PROCEDURE — 80053 COMPREHEN METABOLIC PANEL: CPT

## 2023-08-30 PROCEDURE — 85027 COMPLETE CBC AUTOMATED: CPT

## 2023-08-31 ENCOUNTER — HOSPITAL ENCOUNTER (EMERGENCY)
Facility: HOSPITAL | Age: 58
Discharge: OTHER INSTITUTION | End: 2023-08-31
Attending: EMERGENCY MEDICINE
Payer: COMMERCIAL

## 2023-08-31 ENCOUNTER — APPOINTMENT (OUTPATIENT)
Dept: CT IMAGING | Facility: HOSPITAL | Age: 58
End: 2023-08-31
Payer: COMMERCIAL

## 2023-08-31 ENCOUNTER — OFFICE VISIT (OUTPATIENT)
Dept: CARDIOLOGY | Facility: CLINIC | Age: 58
End: 2023-08-31
Payer: COMMERCIAL

## 2023-08-31 VITALS
WEIGHT: 168.21 LBS | TEMPERATURE: 98.2 F | RESPIRATION RATE: 21 BRPM | SYSTOLIC BLOOD PRESSURE: 82 MMHG | HEIGHT: 67 IN | BODY MASS INDEX: 26.4 KG/M2 | DIASTOLIC BLOOD PRESSURE: 49 MMHG | OXYGEN SATURATION: 94 % | HEART RATE: 68 BPM

## 2023-08-31 VITALS
DIASTOLIC BLOOD PRESSURE: 59 MMHG | BODY MASS INDEX: 26.06 KG/M2 | HEIGHT: 67 IN | HEART RATE: 85 BPM | SYSTOLIC BLOOD PRESSURE: 96 MMHG | OXYGEN SATURATION: 93 % | WEIGHT: 166 LBS

## 2023-08-31 DIAGNOSIS — I10 ESSENTIAL HYPERTENSION: ICD-10-CM

## 2023-08-31 DIAGNOSIS — R06.02 SHORTNESS OF BREATH: Primary | ICD-10-CM

## 2023-08-31 DIAGNOSIS — R07.9 CHEST PAIN, UNSPECIFIED TYPE: ICD-10-CM

## 2023-08-31 DIAGNOSIS — Z95.0 PRESENCE OF CARDIAC PACEMAKER: ICD-10-CM

## 2023-08-31 DIAGNOSIS — J90 PLEURAL EFFUSION: Primary | ICD-10-CM

## 2023-08-31 DIAGNOSIS — R00.2 PALPITATIONS: ICD-10-CM

## 2023-08-31 LAB
ALBUMIN SERPL-MCNC: 4.1 G/DL (ref 3.5–5.2)
ALBUMIN/GLOB SERPL: 1.3 G/DL
ALP SERPL-CCNC: 151 U/L (ref 39–117)
ALT SERPL W P-5'-P-CCNC: 78 U/L (ref 1–33)
ANION GAP SERPL CALCULATED.3IONS-SCNC: 12.9 MMOL/L (ref 5–15)
AST SERPL-CCNC: 132 U/L (ref 1–32)
BASOPHILS # BLD AUTO: 0.05 10*3/MM3 (ref 0–0.2)
BASOPHILS NFR BLD AUTO: 0.5 % (ref 0–1.5)
BILIRUB SERPL-MCNC: 0.5 MG/DL (ref 0–1.2)
BUN SERPL-MCNC: 10 MG/DL (ref 6–20)
BUN/CREAT SERPL: 12 (ref 7–25)
CALCIUM SPEC-SCNC: 9.4 MG/DL (ref 8.6–10.5)
CHLORIDE SERPL-SCNC: 94 MMOL/L (ref 98–107)
CK SERPL-CCNC: 50 U/L (ref 20–180)
CO2 SERPL-SCNC: 32.1 MMOL/L (ref 22–29)
CREAT SERPL-MCNC: 0.83 MG/DL (ref 0.57–1)
D-LACTATE SERPL-SCNC: 1.4 MMOL/L (ref 0.5–2)
DEPRECATED RDW RBC AUTO: 45.1 FL (ref 37–54)
EGFRCR SERPLBLD CKD-EPI 2021: 81.3 ML/MIN/1.73
EOSINOPHIL # BLD AUTO: 0 10*3/MM3 (ref 0–0.4)
EOSINOPHIL NFR BLD AUTO: 0 % (ref 0.3–6.2)
ERYTHROCYTE [DISTWIDTH] IN BLOOD BY AUTOMATED COUNT: 13.2 % (ref 12.3–15.4)
GEN 5 2HR TROPONIN T REFLEX: 18 NG/L
GLOBULIN UR ELPH-MCNC: 3.1 GM/DL
GLUCOSE SERPL-MCNC: 125 MG/DL (ref 65–99)
HCT VFR BLD AUTO: 33.7 % (ref 34–46.6)
HGB BLD-MCNC: 10.8 G/DL (ref 12–15.9)
HOLD SPECIMEN: NORMAL
IMM GRANULOCYTES # BLD AUTO: 0.04 10*3/MM3 (ref 0–0.05)
IMM GRANULOCYTES NFR BLD AUTO: 0.4 % (ref 0–0.5)
LYMPHOCYTES # BLD AUTO: 0.68 10*3/MM3 (ref 0.7–3.1)
LYMPHOCYTES NFR BLD AUTO: 6.7 % (ref 19.6–45.3)
MAGNESIUM SERPL-MCNC: 2.1 MG/DL (ref 1.6–2.6)
MCH RBC QN AUTO: 29.8 PG (ref 26.6–33)
MCHC RBC AUTO-ENTMCNC: 32 G/DL (ref 31.5–35.7)
MCV RBC AUTO: 93.1 FL (ref 79–97)
MONOCYTES # BLD AUTO: 0.96 10*3/MM3 (ref 0.1–0.9)
MONOCYTES NFR BLD AUTO: 9.5 % (ref 5–12)
NEUTROPHILS NFR BLD AUTO: 8.38 10*3/MM3 (ref 1.7–7)
NEUTROPHILS NFR BLD AUTO: 82.9 % (ref 42.7–76)
NRBC BLD AUTO-RTO: 0 /100 WBC (ref 0–0.2)
PLATELET # BLD AUTO: 352 10*3/MM3 (ref 140–450)
PMV BLD AUTO: 9.7 FL (ref 6–12)
POTASSIUM SERPL-SCNC: 2.9 MMOL/L (ref 3.5–5.2)
PROT SERPL-MCNC: 7.2 G/DL (ref 6–8.5)
QT INTERVAL: 327 MS
QTC INTERVAL: 411 MS
RBC # BLD AUTO: 3.62 10*6/MM3 (ref 3.77–5.28)
SARS-COV-2 RNA RESP QL NAA+PROBE: NOT DETECTED
SODIUM SERPL-SCNC: 139 MMOL/L (ref 136–145)
TROPONIN T DELTA: 1 NG/L
TROPONIN T SERPL HS-MCNC: 17 NG/L
WBC NRBC COR # BLD: 10.11 10*3/MM3 (ref 3.4–10.8)
WHOLE BLOOD HOLD COAG: NORMAL

## 2023-08-31 PROCEDURE — 25510000001 IOPAMIDOL PER 1 ML: Performed by: EMERGENCY MEDICINE

## 2023-08-31 PROCEDURE — 36415 COLL VENOUS BLD VENIPUNCTURE: CPT

## 2023-08-31 PROCEDURE — 96375 TX/PRO/DX INJ NEW DRUG ADDON: CPT

## 2023-08-31 PROCEDURE — 71260 CT THORAX DX C+: CPT

## 2023-08-31 PROCEDURE — 85025 COMPLETE CBC W/AUTO DIFF WBC: CPT | Performed by: EMERGENCY MEDICINE

## 2023-08-31 PROCEDURE — 83605 ASSAY OF LACTIC ACID: CPT | Performed by: EMERGENCY MEDICINE

## 2023-08-31 PROCEDURE — 87635 SARS-COV-2 COVID-19 AMP PRB: CPT | Performed by: EMERGENCY MEDICINE

## 2023-08-31 PROCEDURE — 99285 EMERGENCY DEPT VISIT HI MDM: CPT

## 2023-08-31 PROCEDURE — 80053 COMPREHEN METABOLIC PANEL: CPT | Performed by: EMERGENCY MEDICINE

## 2023-08-31 PROCEDURE — 96374 THER/PROPH/DIAG INJ IV PUSH: CPT

## 2023-08-31 PROCEDURE — 93005 ELECTROCARDIOGRAM TRACING: CPT | Performed by: EMERGENCY MEDICINE

## 2023-08-31 PROCEDURE — 83735 ASSAY OF MAGNESIUM: CPT | Performed by: EMERGENCY MEDICINE

## 2023-08-31 PROCEDURE — 84484 ASSAY OF TROPONIN QUANT: CPT | Performed by: EMERGENCY MEDICINE

## 2023-08-31 PROCEDURE — 25010000002 HYDROMORPHONE 1 MG/ML SOLUTION: Performed by: EMERGENCY MEDICINE

## 2023-08-31 PROCEDURE — 82550 ASSAY OF CK (CPK): CPT | Performed by: EMERGENCY MEDICINE

## 2023-08-31 PROCEDURE — 25010000002 KETOROLAC TROMETHAMINE PER 15 MG: Performed by: EMERGENCY MEDICINE

## 2023-08-31 RX ORDER — KETOROLAC TROMETHAMINE 30 MG/ML
30 INJECTION, SOLUTION INTRAMUSCULAR; INTRAVENOUS ONCE
Status: COMPLETED | OUTPATIENT
Start: 2023-08-31 | End: 2023-08-31

## 2023-08-31 RX ORDER — HYDROCODONE BITARTRATE AND ACETAMINOPHEN 5; 325 MG/1; MG/1
1 TABLET ORAL EVERY 6 HOURS PRN
COMMUNITY

## 2023-08-31 RX ORDER — SODIUM CHLORIDE 0.9 % (FLUSH) 0.9 %
10 SYRINGE (ML) INJECTION AS NEEDED
Status: DISCONTINUED | OUTPATIENT
Start: 2023-08-31 | End: 2023-08-31 | Stop reason: HOSPADM

## 2023-08-31 RX ADMIN — SODIUM CHLORIDE 1000 ML: 9 INJECTION, SOLUTION INTRAVENOUS at 13:00

## 2023-08-31 RX ADMIN — IOPAMIDOL 100 ML: 755 INJECTION, SOLUTION INTRAVENOUS at 12:34

## 2023-08-31 RX ADMIN — SODIUM CHLORIDE 500 ML: 9 INJECTION, SOLUTION INTRAVENOUS at 15:02

## 2023-08-31 RX ADMIN — HYDROMORPHONE HYDROCHLORIDE 1 MG: 1 INJECTION, SOLUTION INTRAMUSCULAR; INTRAVENOUS; SUBCUTANEOUS at 12:05

## 2023-08-31 RX ADMIN — KETOROLAC TROMETHAMINE 30 MG: 30 INJECTION, SOLUTION INTRAMUSCULAR; INTRAVENOUS at 12:03

## 2023-08-31 NOTE — PROGRESS NOTES
Chief Complaint  Pacemaker Check, Follow-up, Hypertension, and Hyperlipidemia    Subjective        History of Present Illness  Lucina Payan presents to [facility] CARDIOLOGY   Ms. Payan is a 57-year-old female patient coming in today with complaints of shortness of breath and chest pain.  She is approximately 4 weeks status post aortic aneurysm repair with Dr. Velasco.  Her symptoms of came on fairly suddenly , she was feeling fairly well at her follow-up office visit on 8/28 with Dr. Velasco.  However the following day she began having these symptoms, she attempted to walk to her mailbox and had to stop twice to rest.  She underwent chest x-ray which is concerning for some atelectasis bilateral pleural effusions significantly worse on the right.  She feels short of breath, has pain with deep inspiration, and having pain across chest wall different than her post op soreness to sternotomy site.   She has some palpiations, which seem worse than usual.          Past Medical History:   Diagnosis Date    Allergic Seasonal    Arthritis 2021    Bradycardia     Cardiac pacemaker     Cholelithiasis Removed 2003    Depression     GERD (gastroesophageal reflux disease)     Heart murmur 1983    HL (hearing loss) 2021    Hyperlipidemia     Hypertension     Hypothyroidism     Obesity 1974    Pancreatitis 2003    Seizure     Thoracic aortic aneurysm     Urinary tract infection Several    Visual impairment Wear bifocals       Allergies   Allergen Reactions    Demerol [Meperidine] Seizure    Morphine Seizure    Silver Other (See Comments)     Jimy silver - can be a base for certain creams -  Skin turns black         Past Surgical History:   Procedure Laterality Date    ADENOIDECTOMY  18984    APPENDECTOMY      BARIATRIC SURGERY  2003    CARDIAC CATHETERIZATION      CARDIAC CATHETERIZATION N/A 06/20/2023    Procedure: Left Heart Cath;  Surgeon: Yayo Light MD;  Location: [facility] INVASIVE LOCATION;   Service: Cardiovascular;  Laterality: N/A;    CARDIAC PACEMAKER PLACEMENT      [employer]     SECTION      x2    CHOLECYSTECTOMY      COLONOSCOPY      GASTRIC BYPASS      LYMPH NODE BIOPSY      POSTPARTUM TUBAL LIGATION      TONSILLECTOMY  1984    TONSILLECTOMY AND ADENOIDECTOMY      TUBAL ABDOMINAL LIGATION          Social History  She  reports that she has been smoking cigarettes. She started smoking about 2 years ago. She has a 3.00 pack-year smoking history. She has never used smokeless tobacco. She reports current alcohol use of about 1.0 standard drink per week. She reports that she does not use drugs.    Family History  Her family history includes Anxiety disorder in her sister; Arthritis in her father and mother; Bipolar disorder in her sister; Birth defects in her daughter; Cancer in her father and mother; Dementia in her father; Depression in her mother and sister; Diabetes in her daughter; Heart disease in her mother; Hyperlipidemia in her father and mother; Hypertension in her father and mother; Kidney disease in her mother; Learning disabilities in her daughter; Liver cancer in her mother; Liver disease in her mother; Mental illness in her brother, mother, and sister; Miscarriages / Stillbirths in her daughter and sister; Schizophrenia in her sister; Suicide Attempts in her mother; Thyroid disease in her daughter and mother; Vision loss in her maternal grandmother.       Current Outpatient Medications on File Prior to Visit   Medication Sig    atorvastatin (LIPITOR) 80 MG tablet     buPROPion XL (WELLBUTRIN XL) 300 MG 24 hr tablet Take 1 tablet by mouth Daily.    coenzyme Q10 100 MG capsule Take 1 capsule by mouth Every Night.    famotidine (PEPCID) 40 MG tablet TAKE ONE TABLET BY MOUTH DAILY    ferrous sulfate (FeroSul) 325 (65 FE) MG tablet Every other day (Patient taking differently: Every other night)    levothyroxine (SYNTHROID, LEVOTHROID) 112 MCG tablet Take 1 tablet by mouth  "Every Morning.    metoprolol tartrate (LOPRESSOR) 25 MG tablet     sertraline (ZOLOFT) 50 MG tablet TAKE THREE TABLETS BY MOUTH DAILY    torsemide (DEMADEX) 20 MG tablet Take 0.5 tablets by mouth Daily. (Patient taking differently: Take 0.5 tablets by mouth Every Other Day.)    traZODone (DESYREL) 100 MG tablet Take 1 tablet by mouth every night at bedtime.     No current facility-administered medications on file prior to visit.         Review of Systems   Constitutional:  Positive for fatigue.   Respiratory:  Positive for chest tightness and shortness of breath.    Cardiovascular:  Positive for chest pain and palpitations.   Gastrointestinal:  Negative for nausea and vomiting.      Objective   Vitals:    08/31/23 0949   BP: 96/59   Pulse: 85   SpO2: 93%   Weight: 75.3 kg (166 lb)   Height: 170.2 cm (67\")         Physical Exam  General : Alert, awake, tearful  Neck : Supple, no carotid bruit, no jugular venous distention  CVS : Regular rate and rhythm, no murmur, rubs or gallops  Lungs: Decreased bases worse on right side, pain with deep inspiration, no crackles or rhonchi  Abdomen: Soft, nontender, bowel sounds active  Extremities: Warm, well-perfused,trace pedal edema      Result Review     The following data was reviewed by TISHA Villeda  No results found for: PROBNP  CMP          1/13/2023    10:56 6/20/2023    07:55 8/30/2023    10:36   CMP   Glucose 92  97  106    BUN 14  15  8    Creatinine 0.99  0.83  0.80    EGFR 65.8  81.3  85.0    Sodium 141  141  142    Potassium 4.2  3.0  3.0    Chloride 101  101  99    Calcium 9.8  9.5  9.0    Total Protein 7.0   6.6    Albumin 4.6   3.6    Globulin 2.4   3.0    Total Bilirubin 0.3   0.4    Alkaline Phosphatase 136   97    AST (SGOT) 32   43    ALT (SGPT) 21   30    Albumin/Globulin Ratio 1.9   1.2    BUN/Creatinine Ratio 14.1  18.1  10.0    Anion Gap 10.0  9.4  11.5      CBC w/diff          1/13/2023    10:56 6/20/2023    07:55 8/30/2023    10:36   CBC w/Diff "   WBC 6.88  7.35  8.41    RBC 4.82  4.72  3.52    Hemoglobin 15.3  15.6  10.7    Hematocrit 45.7  44.7  33.5    MCV 94.8  94.7  95.2    MCH 31.7  33.1  30.4    MCHC 33.5  34.9  31.9    RDW 12.0  12.1  13.0    Platelets 231  237  386    Neutrophil Rel % 62.8  64.1     Immature Granulocyte Rel % 0.1  0.1     Lymphocyte Rel % 27.6  25.9     Monocyte Rel % 8.3  7.3     Eosinophil Rel % 0.3  1.6     Basophil Rel % 0.9  1.0        Lab Results   Component Value Date    TSH 2.880 01/13/2023      Lab Results   Component Value Date    FREET4 1.6 09/10/2019      No results found for: DDIMERQUANT  Magnesium   Date Value Ref Range Status   06/20/2023 2.1 1.6 - 2.6 mg/dL Final      No results found for: DIGOXIN   No results found for: TROPONINT        Lipid Panel          9/30/2022    08:38   Lipid Panel   Total Cholesterol 169    Triglycerides 89    HDL Cholesterol 71    VLDL Cholesterol 16    LDL Cholesterol  82    LDL/HDL Ratio 1.13        Results for orders placed during the hospital encounter of 06/20/23    Adult Transthoracic Echo Complete W/ Cont if Necessary Per Protocol    Interpretation Summary    Left ventricular systolic function is normal. Left ventricular ejection fraction appears to be 61 - 65%.    Left ventricular diastolic function is consistent with (grade I) impaired relaxation.    There are no significant valvular abnormalities.    Aneurysmal dilation of the ascending aorta is present, measuring 4.7 cm diameter.    Estimated right ventricular systolic pressure from tricuspid regurgitation is normal (<35 mmHg).         Assessment and Plan   Diagnoses and all orders for this visit:    1. Shortness of breath (Primary)    2. Essential hypertension    3. Presence of cardiac pacemaker    4. Chest pain, unspecified type    5. Palpitations      Shortness of breath-chest x-ray yesterday which shows bilateral pleural effusion worse on the right.  There is significant difference in bilateral lungs.  On exam she has pain  with deep inspiration, and her O2 sats are 92 to 93% on room air.  She feels short of breath although she is able to carry on conversation without difficulty.  Essential hypertension-pressure in the office today is on low side at 98/69.  This does not leave us much room to consider diuretics to treat her pleural effusions.  Her cardiac pacemaker is nearing end of battery, she is already scheduled for battery change in the next couple of weeks.  Chest Pain-pain is worse with deep inspiration.  Differentials would include PE pressure considering her recent surgery.  Considering her Chemet of symptoms I think it is worth an ER evaluation.  5.  Palpitations-states she has been having some ongoing palpitations which have been worse over the last 1 to 2 days.  For now recommend pursuing ER evaluation for her other symptoms, if these do not resolve with treatment from her other symptoms, then we will bring her back to the office for Holter study.      Discussed with patient considering her symptoms, vital signs, and history of recent surgery that she needs ER evaluation.  Would recommend having CT for further evaluation.  Will make phone call and discuss with ER physician .        No follow-ups on file.  Patient to follow-up after ER evaluation.    Patient was given instructions and counseling regarding her condition or for health maintenance advice. Please see specific information pulled into the AVS if appropriate.     Signed,  Yoanna Alston, APRN  08/31/2023     Dictated Utilizing Dragon Dictation: Please note that portions of this note were completed with a voice recognition program.  Part of this note may be an electronic transcription/translation of spoken language to printed text using the Dragon Dictation System.

## 2023-08-31 NOTE — ED PROVIDER NOTES
Time: 11:57 AM EDT  Date of encounter:  2023  Independent Historian/Clinical History and Information was obtained by:   Patient    History is limited by: N/A    Chief Complaint: chest pain      History of Present Illness:  Patient is a 57 y.o. year old female who presents to the emergency department for evaluation of chest pain for the past day.  Patient denies fever and chills.  Patient denies shortness of breath.  Patient has no cough hemoptysis.  Patient denies nausea, vomiting, and diarrhea.  Patient does describe the chest pain as heaviness.  Patient reports that she had a CABG performed on the second of this month.    HPI    Patient Care Team  Primary Care Provider: Brooke Clark MD    Past Medical History:     Allergies   Allergen Reactions    Demerol [Meperidine] Seizure    Morphine Seizure    Silver Other (See Comments)     Jimy silver - can be a base for certain creams -  Skin turns black      Past Medical History:   Diagnosis Date    Allergic Seasonal    Arthritis     Hands    Bradycardia     Pacemaker    Cardiac pacemaker     Medtronic follows with Kuldeep    Cholelithiasis Removed     Depression     GERD (gastroesophageal reflux disease)     Heart murmur 1983    HL (hearing loss)     Hyperlipidemia     Hypertension     Hypothyroidism     Obesity 1974    Pancreatitis 2003    Seizure     Medication reaction    Thoracic aortic aneurysm     Urinary tract infection Several    Visual impairment Wear bifocals     Past Surgical History:   Procedure Laterality Date    ADENOIDECTOMY  69504    APPENDECTOMY      BARIATRIC SURGERY  2003    CARDIAC CATHETERIZATION      CARDIAC CATHETERIZATION N/A 2023    Procedure: Left Heart Cath;  Surgeon: Yayo Light MD;  Location: [facility] CATH INVASIVE LOCATION;  Service: Cardiovascular;  Laterality: N/A;    CARDIAC PACEMAKER PLACEMENT      Medtronic     SECTION      x2    CHOLECYSTECTOMY      COLONOSCOPY      GASTRIC BYPASS       LYMPH NODE BIOPSY  2004    POSTPARTUM TUBAL LIGATION      TONSILLECTOMY  1984    TONSILLECTOMY AND ADENOIDECTOMY      TUBAL ABDOMINAL LIGATION  1995     Family History   Problem Relation Age of Onset    Liver cancer Mother     Hypertension Mother     Depression Mother         3 multiple shock treatments    Suicide Attempts Mother         3    Arthritis Mother     Cancer Mother         Liver, stomach, lung    Heart disease Mother     Hyperlipidemia Mother     Kidney disease Mother     Liver disease Mother     Mental illness Mother     Thyroid disease Mother     Hypertension Father     Dementia Father     Arthritis Father     Cancer Father         Skin    Hyperlipidemia Father     Bipolar disorder Sister     Depression Sister         Bipolar    Schizophrenia Sister     Anxiety disorder Sister     Mental illness Sister         Bi-polar    Miscarriages / Stillbirths Sister         Abortions -3    Vision loss Maternal Grandmother     Birth defects Daughter         Ureathral reimplantation    Diabetes Daughter         Type 1    Learning disabilities Daughter         ADHD    Miscarriages / Stillbirths Daughter     Thyroid disease Daughter     Mental illness Brother        Home Medications:  Prior to Admission medications    Medication Sig Start Date End Date Taking? Authorizing Provider   atorvastatin (LIPITOR) 80 MG tablet  8/7/23   ProviderBeverly MD   buPROPion XL (WELLBUTRIN XL) 300 MG 24 hr tablet Take 1 tablet by mouth Daily. 6/12/23   Brooke Clark MD   coenzyme Q10 100 MG capsule Take 1 capsule by mouth Every Night.    Beverly Ahuja MD   famotidine (PEPCID) 40 MG tablet TAKE ONE TABLET BY MOUTH DAILY 8/7/23   Brooke Clark MD   ferrous sulfate (FeroSul) 325 (65 FE) MG tablet Every other day  Patient taking differently: Every other night 1/13/23   Brooke Clark MD   HYDROcodone-acetaminophen (NORCO) 5-325 MG per tablet Take 1 tablet by mouth Every 6 (Six) Hours As  "Needed.    ProviderBeverly MD   levothyroxine (SYNTHROID, LEVOTHROID) 112 MCG tablet Take 1 tablet by mouth Every Morning. 7/31/23   Brooke Clark MD   metoprolol tartrate (LOPRESSOR) 25 MG tablet  8/7/23   ProviderBeverly MD   sertraline (ZOLOFT) 50 MG tablet TAKE THREE TABLETS BY MOUTH DAILY 8/4/23   Brooke Clark MD   torsemide (DEMADEX) 20 MG tablet Take 0.5 tablets by mouth Daily.  Patient taking differently: Take 0.5 tablets by mouth Every Other Day. 6/20/23   Yayo Light MD   traZODone (DESYREL) 100 MG tablet Take 1 tablet by mouth every night at bedtime. 6/12/23   Brooke Clark MD        Social History:   Social History     Tobacco Use    Smoking status: Every Day     Packs/day: 1.00     Years: 3.00     Pack years: 3.00     Types: Cigarettes     Start date: 4/13/2021    Smokeless tobacco: Never    Tobacco comments:     Restarted 7/14/2019   Vaping Use    Vaping Use: Never used   Substance Use Topics    Alcohol use: Yes     Alcohol/week: 1.0 standard drink     Types: 1 Glasses of wine per week    Drug use: Never         Review of Systems:  Review of Systems   Constitutional:  Negative for chills and fever.   HENT:  Negative for congestion, rhinorrhea and sore throat.    Eyes:  Negative for pain and visual disturbance.   Respiratory:  Negative for apnea, cough, chest tightness and shortness of breath.    Cardiovascular:  Positive for chest pain. Negative for palpitations.   Gastrointestinal:  Negative for abdominal pain, diarrhea, nausea and vomiting.   Genitourinary:  Negative for difficulty urinating and dysuria.   Musculoskeletal:  Negative for joint swelling and myalgias.   Skin:  Negative for color change.   Neurological:  Negative for seizures and headaches.   Psychiatric/Behavioral: Negative.     All other systems reviewed and are negative.     Physical Exam:  BP (!) 82/49   Pulse 68   Temp 98.2 °F (36.8 °C) (Oral)   Resp 21   Ht 170.2 cm (67\")   " Wt 76.3 kg (168 lb 3.4 oz)   SpO2 94%   BMI 26.35 kg/m²     Physical Exam  Vitals and nursing note reviewed.   Constitutional:       General: She is not in acute distress.     Appearance: Normal appearance. She is not toxic-appearing.   HENT:      Head: Normocephalic and atraumatic.      Jaw: There is normal jaw occlusion.   Eyes:      General: Lids are normal.      Extraocular Movements: Extraocular movements intact.      Conjunctiva/sclera: Conjunctivae normal.      Pupils: Pupils are equal, round, and reactive to light.   Cardiovascular:      Rate and Rhythm: Normal rate and regular rhythm.      Pulses: Normal pulses.      Heart sounds: Normal heart sounds.   Pulmonary:      Effort: Pulmonary effort is normal. No respiratory distress.      Breath sounds: Normal breath sounds. No wheezing or rhonchi.   Chest:      Chest wall: Tenderness present.   Abdominal:      General: Abdomen is flat.      Palpations: Abdomen is soft.      Tenderness: There is no abdominal tenderness. There is no guarding or rebound.   Musculoskeletal:         General: Normal range of motion.      Cervical back: Normal range of motion and neck supple.      Right lower leg: No edema.      Left lower leg: No edema.   Skin:     General: Skin is warm and dry.   Neurological:      Mental Status: She is alert and oriented to person, place, and time. Mental status is at baseline.   Psychiatric:         Mood and Affect: Mood normal.                Procedures:  Procedures      Medical Decision Making:      Comorbidities that affect care:    Coronary Artery Disease    External Notes reviewed:    Previous Clinic Note: Patient was seen by Venecia Alston today for chest pain and shortness of breath      The following orders were placed and all results were independently analyzed by me:  Orders Placed This Encounter   Procedures    COVID-19,CEPHEID/RUSSELL,COR/ANASTASIA/PAD/KARIS/MAD IN-HOUSE(OR EMERGENT/ADD-ON),NP SWAB IN TRANSPORT MEDIA 3-4 HR TAT, RT-PCR - Swab,  Nasopharynx    CT Chest With Contrast Diagnostic    Comprehensive Metabolic Panel    Lactic Acid, Plasma    High Sensitivity Troponin T    Magnesium    CK    CBC Auto Differential    [place] Draw    High Sensitivity Troponin T 2Hr    General MD Inpatient Consult    ECG 12 Lead Chest Pain    Insert peripheral IV    CBC & Differential    Gold Top - SST    Light Blue Top       Medications Given in the Emergency Department:  Medications   sodium chloride 0.9 % flush 10 mL (has no administration in time range)   ketorolac (TORADOL) injection 30 mg (30 mg Intravenous Given 8/31/23 1203)   HYDROmorphone (DILAUDID) injection 1 mg (1 mg Intravenous Given 8/31/23 1205)   iopamidol (ISOVUE-370) 76 % injection 100 mL (100 mL Intravenous Given 8/31/23 1234)   sodium chloride 0.9 % bolus 1,000 mL (0 mL Intravenous Stopped 8/31/23 1351)   sodium chloride 0.9 % bolus 500 mL (0 mL Intravenous Transferred to External Facility 8/31/23 1648)        ED Course:         Labs:    Lab Results (last 24 hours)       Procedure Component Value Units Date/Time    CBC & Differential [[id number]]  (Abnormal) Collected: 08/31/23 1155    Specimen: Blood Updated: 08/31/23 1204    Narrative:      The following orders were created for panel order CBC & Differential.  Procedure                               Abnormality         Status                     ---------                               -----------         ------                     CBC Auto Differential[[id number]]        Abnormal            Final result                 Please view results for these tests on the individual orders.    Comprehensive Metabolic Panel [[id number]]  (Abnormal) Collected: 08/31/23 1155    Specimen: Blood Updated: 08/31/23 1224     Glucose 125 mg/dL      BUN 10 mg/dL      Creatinine 0.83 mg/dL      Sodium 139 mmol/L      Potassium 2.9 mmol/L      Chloride 94 mmol/L      CO2 32.1 mmol/L      Calcium 9.4 mg/dL      Total Protein 7.2 g/dL      Albumin 4.1 g/dL      ALT (SGPT)  78 U/L      AST (SGOT) 132 U/L      Alkaline Phosphatase 151 U/L      Total Bilirubin 0.5 mg/dL      Globulin 3.1 gm/dL      A/G Ratio 1.3 g/dL      BUN/Creatinine Ratio 12.0     Anion Gap 12.9 mmol/L      eGFR 81.3 mL/min/1.73     Narrative:      GFR Normal >60  Chronic Kidney Disease <60  Kidney Failure <15      Lactic Acid, Plasma [[id number]]  (Normal) Collected: 08/31/23 1155    Specimen: Blood Updated: 08/31/23 1221     Lactate 1.4 mmol/L     High Sensitivity Troponin T [[id number]]  (Abnormal) Collected: 08/31/23 1155    Specimen: Blood Updated: 08/31/23 1223     HS Troponin T 17 ng/L     Narrative:      High Sensitive Troponin T Reference Range:  <10.0 ng/L- Negative Female for AMI  <15.0 ng/L- Negative Male for AMI  >=10 - Abnormal Female indicating possible myocardial injury.  >=15 - Abnormal Male indicating possible myocardial injury.   Clinicians would have to utilize clinical acumen, EKG, Troponin, and serial changes to determine if it is an Acute Myocardial Infarction or myocardial injury due to an underlying chronic condition.         Magnesium [[id number]]  (Normal) Collected: 08/31/23 1155    Specimen: Blood Updated: 08/31/23 1224     Magnesium 2.1 mg/dL     CK [[id number]]  (Normal) Collected: 08/31/23 1155    Specimen: Blood Updated: 08/31/23 1223     Creatine Kinase 50 U/L     CBC Auto Differential [[id number]]  (Abnormal) Collected: 08/31/23 1155    Specimen: Blood Updated: 08/31/23 1204     WBC 10.11 10*3/mm3      RBC 3.62 10*6/mm3      Hemoglobin 10.8 g/dL      Hematocrit 33.7 %      MCV 93.1 fL      MCH 29.8 pg      MCHC 32.0 g/dL      RDW 13.2 %      RDW-SD 45.1 fl      MPV 9.7 fL      Platelets 352 10*3/mm3      Neutrophil % 82.9 %      Lymphocyte % 6.7 %      Monocyte % 9.5 %      Eosinophil % 0.0 %      Basophil % 0.5 %      Immature Grans % 0.4 %      Neutrophils, Absolute 8.38 10*3/mm3      Lymphocytes, Absolute 0.68 10*3/mm3      Monocytes, Absolute 0.96 10*3/mm3      Eosinophils, Absolute  0.00 10*3/mm3      Basophils, Absolute 0.05 10*3/mm3      Immature Grans, Absolute 0.04 10*3/mm3      nRBC 0.0 /100 WBC     COVID-19,CEPHEID/RUSSELL,COR/ANASTASIA/PAD/KARIS/MAD IN-HOUSE(OR EMERGENT/ADD-ON),NP SWAB IN TRANSPORT MEDIA 3-4 HR TAT, RT-PCR - Swab, Nasopharynx [[id number]]  (Normal) Collected: 08/31/23 1159    Specimen: Swab from Nasopharynx Updated: 08/31/23 1246     COVID19 Not Detected    Narrative:      Fact sheet for providers: https://www.fda.gov/media/142746/download     Fact sheet for patients: https://www.fda.gov/media/981054/download  Fact sheet for providers: https://www.fda.gov/media/723404/download     Fact sheet for patients: https://www.fda.gov/media/290840/download    High Sensitivity Troponin T 2Hr [[id number]]  (Abnormal) Collected: 08/31/23 1412    Specimen: Blood Updated: 08/31/23 1438     HS Troponin T 18 ng/L      Troponin T Delta 1 ng/L     Narrative:      High Sensitive Troponin T Reference Range:  <10.0 ng/L- Negative Female for AMI  <15.0 ng/L- Negative Male for AMI  >=10 - Abnormal Female indicating possible myocardial injury.  >=15 - Abnormal Male indicating possible myocardial injury.   Clinicians would have to utilize clinical acumen, EKG, Troponin, and serial changes to determine if it is an Acute Myocardial Infarction or myocardial injury due to an underlying chronic condition.                  Imaging:    CT Chest With Contrast Diagnostic    Result Date: 8/31/2023  PROCEDURE: CT CHEST W CONTRAST DIAGNOSTIC  COMPARISON:  [facility], CR, XR CHEST 2 VW, 8/30/2023, 10:56.  [facility], CT, CT CHEST WO CONTRAST DIAGNOSTIC, 5/08/2023, 10:34. INDICATIONS: shortness of breath--s/p aortic valve and root surgery on 8-2-23, patient developed sob 4 days ago.  TECHNIQUE: After obtaining the patient's consent, CT images were obtained with non-ionic intravenous contrast material.   PROTOCOL:   Pulmonary embolism imaging protocol performed    RADIATION:   DLP: 296.6mGy*cm    Automated exposure control was utilized to minimize radiation dose. CONTRAST: 100cc Isovue 370 I.V.  FINDINGS:  The central tracheobronchial tree is clear.  There are large right and moderate left pleural effusions with bibasilar atelectasis.  There are mild scattered bilateral ground-glass densities.  Median sternotomy wires appear intact.  A left subclavian pacemaker is in place.  The heart is enlarged, with a moderate pericardial effusion.  There are changes from interval ascending thoracic aortic repair that appears widely patent and normal in caliber.  The main pulmonary artery is normal in caliber with no evidence of pulmonary embolus.  There is mild mediastinal lymphadenopathy, likely reactive.  Partial evaluation of the upper abdomen demonstrates postsurgical changes along the stomach.  No aggressive osseous lesions are identified.        1. Negative for pulmonary embolus.  2. Large right and moderate left pleural effusions with scattered bilateral ground-glass densities, suggesting a component of pulmonary edema. 3. Cardiomegaly with moderate pericardial effusion. 4. Changes from interval ascending thoracic aortic repair, widely patent and normal in caliber. 5. Mild mediastinal lymphadenopathy, likely reactive.     JARON ZARAGOZA MD       Electronically Signed and Approved By: JARON ZARAGOZA MD on 8/31/2023 at 12:53                Differential Diagnosis and Discussion:    Chest Pain:  Based on the patient's signs and symptoms, I considered aortic dissection, myocardial infaction, pulmonary embolism, cardiac tamponade, pericarditis, pneumothorax, musculoskeletal chest pain and other differential diagnosis as an etiology of the patient's chest pain.     All labs were reviewed and interpreted by me.  EKG was interpreted by me.  CT scan radiology impression was interpreted by me.    MDM     The patient´s CBC that was reviewed and interpreted by me shows no abnormalities of critical concern. Of note, there  is no anemia requiring a blood transfusion and the platelet count is acceptable.  The patient´s CMP that was reviewed and interpretted by me shows no abnormalities of critical concern. Of note, the patient´s sodium and potassium are acceptable. The patient´s liver enzymes are unremarkable. The patient´s renal function (creatinine) is preserved. The patient has a normal anion gap.  CT scan does show a large pleural effusion with no pulmonary embolism.    Decision making regarding admission:    Risk of Respiratory Compromise: A large pleural effusion can cause significant respiratory distress by compressing the lung and reducing lung volumes. This can lead to hypoxia and, in severe cases, respiratory failure.    Need for Diagnostic and Therapeutic Intervention: Thoracentesis, the procedure to remove fluid from the pleural space, is necessary both for diagnostic purposes (to determine the cause of the effusion) and for therapeutic purposes (to relieve respiratory symptoms and prevent complications). This procedure should be performed in a controlled environment with appropriate monitoring.    Monitoring for Complications: Post-procedural monitoring is essential to ensure that there are no complications such as re-accumulation of fluid, pneumothorax, or infection.    Management of Underlying Causes: The cause of the pleural effusion needs to be determined and managed appropriately. This may include antibiotics for infection, diuretics for heart failure, or other treatments depending on the cause.    Post-CABG Care: The patient is in the postoperative period following CABG and requires ongoing monitoring and management of cardiac function, pain, and other postoperative issues.          Patient Care Considerations:    I considered starting antibiotics, however no bacterial focus of infection was found.      Consultants/Shared Management Plan:    Case was discussed with Dr. Aragon who agrees with transfer.    Social  Determinants of Health:    Patient is independent, reliable, and has access to care.       Disposition and Care Coordination:    Transferred: Through independent evaluation of the patient's history, physical, and imperical data, the patient meets criteria to be transferred to another hospital for evaluation/admission.        Final diagnoses:   Pleural effusion        ED Disposition       ED Disposition   Transfer to Another Facility     Condition   --    Comment   --               This medical record created using voice recognition software.             Randi Johnson MD  08/31/23 1800

## 2023-09-01 ENCOUNTER — TELEPHONE (OUTPATIENT)
Dept: CARDIOLOGY | Facility: CLINIC | Age: 58
End: 2023-09-01

## 2023-09-01 NOTE — TELEPHONE ENCOUNTER
The [facility] received a fax that requires your attention. The document has been indexed to the patient’s chart for your review.      Reason for sending: EXTERNAL MEDICAL RECORD NOTIFICATION     Documents Description: HISTORY & PHYSICAL-[facility]-8.31.23    Name of Sender: [facility]     Date Indexed: 8.31.23

## 2023-09-05 ENCOUNTER — TELEPHONE (OUTPATIENT)
Dept: CARDIOLOGY | Facility: CLINIC | Age: 58
End: 2023-09-05
Payer: COMMERCIAL

## 2023-09-05 LAB
QT INTERVAL: 327 MS
QTC INTERVAL: 411 MS

## 2023-09-05 NOTE — TELEPHONE ENCOUNTER
The [facility] received a fax that requires your attention. The document has been indexed to the patient’s chart for your review.      Reason for sending: EXTERNAL MEDICAL RECORD NOTIFICATION     Documents Description: HISTORY & PHYSICAL-[facility]-9.3.23    Name of Sender: [facility]     Date Indexed: 9.3.23

## 2023-09-08 ENCOUNTER — TELEPHONE (OUTPATIENT)
Dept: CARDIOLOGY | Facility: CLINIC | Age: 58
End: 2023-09-08

## 2023-09-08 NOTE — TELEPHONE ENCOUNTER
The [facility] received a fax that requires your attention. The document has been indexed to the patient’s chart for your review.      Reason for sending: EXTERNAL MEDICAL RECORD NOTIFICATION     Documents Description: DISCHARGE SUM-[facility]-9.8.23    Name of Sender: [facility]     Date Indexed: 9.8.23

## 2023-09-12 ENCOUNTER — TELEPHONE (OUTPATIENT)
Dept: CARDIOLOGY | Facility: CLINIC | Age: 58
End: 2023-09-12
Payer: COMMERCIAL

## 2023-09-12 NOTE — TELEPHONE ENCOUNTER
Caller: Lucina Payan    Relationship to patient: Self    Best call back number: [phone]    Chief complaint: HOSPITAL DISCHARGE    Type of visit: HOSPITAL FOLLOW UP    Requested date: 2 WEEKS     Additional notes:PATIENT WAS DISCHARGED ON 9.8.23 FROM [facility] AND IS NEEDING A 2 WEEK HOSPITAL FOLLOW UP, DISCHARGE DEZ IS IN PATIENTS CHART, JUST DIDN'T SEE WHERE IT STATED TO SCHEDULE PATIENT.     PATIENT IS ALSO WANTING TO KNOW WHAT TIME HER BATTERY REPLACEMENT IS ON 9.26.23 AS SHE HAS NOT HEARD ANYTHING ABOUT THAT YET. PLEASE CALL PATIENT BACK ABOUT AN APPOINTMENT AND A TIME FOR HER APPOINTMENTS, THANK YOU

## 2023-09-13 ENCOUNTER — TRANSCRIBE ORDERS (OUTPATIENT)
Dept: ADMINISTRATIVE | Facility: HOSPITAL | Age: 58
End: 2023-09-13
Payer: COMMERCIAL

## 2023-09-13 DIAGNOSIS — J90 RECURRENT RIGHT PLEURAL EFFUSION: Primary | ICD-10-CM

## 2023-09-13 DIAGNOSIS — Z92.89 HISTORY OF ENGINEERED CELL THERAPY INFUSIONS: Primary | ICD-10-CM

## 2023-09-13 NOTE — TELEPHONE ENCOUNTER
I spoke to patient and gave an arrival time of 7:30 on 09/26/23 for Medtronic dual chamber pacemaker battery change. Patient was instructed to have a  for the day of the procedure and to arrive at the main entrance/registration area. Patient was instructed to hold Eliquis for 48 hours prior to procedure. Patient was instructed to continue all other medications as usual. Patient was instructed to be NPO after midnight with sips of water as needed. Patient is agreeable with no other questions or concerns.

## 2023-09-14 ENCOUNTER — TELEPHONE (OUTPATIENT)
Dept: CARDIOLOGY | Facility: CLINIC | Age: 58
End: 2023-09-14

## 2023-09-14 NOTE — TELEPHONE ENCOUNTER
The [facility] received a fax that requires your attention. The document has been indexed to the patient’s chart for your review.      Reason for sending: EXTERNAL MEDICAL RECORD NOTIFICATION     Documents Description: H&P-[facility]-9.14.23    Name of Sender: [facility]     Date Indexed: 9.14.23

## 2023-09-18 ENCOUNTER — TELEPHONE (OUTPATIENT)
Dept: CARDIOLOGY | Facility: CLINIC | Age: 58
End: 2023-09-18

## 2023-09-18 NOTE — TELEPHONE ENCOUNTER
The [facility] received a fax that requires your attention. The document has been indexed to the patient’s chart for your review.      Reason for sending: EXTERNAL MEDICAL RECORD NOTIFICATION     Documents Description: H&P-[facility]-9.15.23    Name of Sender: [facility]     Date Indexed: 9.15.23

## 2023-09-21 ENCOUNTER — TELEPHONE (OUTPATIENT)
Dept: CARDIOLOGY | Facility: CLINIC | Age: 58
End: 2023-09-21

## 2023-09-21 NOTE — TELEPHONE ENCOUNTER
The [facility] received a fax that requires your attention. The document has been indexed to the patient’s chart for your review.      Reason for sending: EXTERNAL MEDICAL RECORD NOTIFICATION     Documents Description: H&P-[facility]-9.20.23    Name of Sender: [facility]     Date Indexed: 9.20.23

## 2023-09-28 ENCOUNTER — TELEPHONE (OUTPATIENT)
Dept: CARDIOLOGY | Facility: CLINIC | Age: 58
End: 2023-09-28

## 2023-09-28 NOTE — TELEPHONE ENCOUNTER
The [facility] received a fax that requires your attention. The document has been indexed to the patient’s chart for your review.      Reason for sending: EXTERNAL MEDICAL RECORD NOTIFICATION     Documents Description: DISCHARGE SUM-[facility]-9.28.23    Name of Sender: [facility]     Date Indexed: 9.28.23

## 2023-10-02 ENCOUNTER — TELEPHONE (OUTPATIENT)
Dept: CARDIOLOGY | Facility: CLINIC | Age: 58
End: 2023-10-02

## 2023-10-02 DIAGNOSIS — F33.1 MODERATE EPISODE OF RECURRENT MAJOR DEPRESSIVE DISORDER: ICD-10-CM

## 2023-10-02 NOTE — TELEPHONE ENCOUNTER
Caller: Lucina Payan    Relationship to patient: Self    Best call back number: [phone]    Patient is needing: PATIENT HAD OPEN HEART SURGERY IN AUGUST AT [facility] RECORDS ARE NOW IN [facility]. PATIENT HAD COMPLICATIONS AND ENDED UP BACK IN THE HOSPITAL WITH FLUID BUILD UP AND HIGH BP. THE HOSPITAL WANTED HER TO SCHEDULE A FOLLOW UP ASAP WITH DR UREÑA.

## 2023-10-03 RX ORDER — TRAZODONE HYDROCHLORIDE 100 MG/1
TABLET ORAL
Qty: 90 TABLET | Refills: 1 | Status: SHIPPED | OUTPATIENT
Start: 2023-10-03

## 2023-10-04 ENCOUNTER — OFFICE VISIT (OUTPATIENT)
Dept: CARDIOLOGY | Facility: CLINIC | Age: 58
End: 2023-10-04
Payer: COMMERCIAL

## 2023-10-04 VITALS
HEART RATE: 68 BPM | HEIGHT: 67 IN | BODY MASS INDEX: 24.04 KG/M2 | WEIGHT: 153.2 LBS | SYSTOLIC BLOOD PRESSURE: 96 MMHG | DIASTOLIC BLOOD PRESSURE: 61 MMHG

## 2023-10-04 DIAGNOSIS — I50.32 CHRONIC DIASTOLIC (CONGESTIVE) HEART FAILURE: Primary | ICD-10-CM

## 2023-10-04 DIAGNOSIS — I71.21 ANEURYSM OF ASCENDING AORTA WITHOUT RUPTURE: ICD-10-CM

## 2023-10-04 DIAGNOSIS — Z95.0 PRESENCE OF CARDIAC PACEMAKER: ICD-10-CM

## 2023-10-04 DIAGNOSIS — I10 ESSENTIAL HYPERTENSION: ICD-10-CM

## 2023-10-04 DIAGNOSIS — I48.0 PAROXYSMAL ATRIAL FIBRILLATION: ICD-10-CM

## 2023-10-04 RX ORDER — MIDODRINE HYDROCHLORIDE 2.5 MG/1
5 TABLET ORAL
COMMUNITY

## 2023-10-04 RX ORDER — POTASSIUM CHLORIDE 750 MG/1
10 CAPSULE, EXTENDED RELEASE ORAL 2 TIMES DAILY
COMMUNITY

## 2023-10-04 RX ORDER — TORSEMIDE 20 MG/1
20 TABLET ORAL DAILY
Qty: 90 TABLET | Refills: 3
Start: 2023-10-04

## 2023-10-04 RX ORDER — COLCHICINE 0.6 MG/1
0.6 TABLET ORAL 2 TIMES DAILY
COMMUNITY

## 2023-10-04 RX ORDER — AMIODARONE HYDROCHLORIDE 200 MG/1
200 TABLET ORAL DAILY
COMMUNITY

## 2023-10-04 NOTE — PROGRESS NOTES
CARDIOLOGY FOLLOW-UP PROGRESS NOTE        Chief Complaint  Hyperlipidemia, Hypertension, and Follow-up (Pace maker)    Subjective            Lucina Payan presents to [facility] CARDIOLOGY  History of Present Illness      Ms Payan is here for a follow-up visit.  She underwent surgical repair of ascending aortic aneurysm by Dr. Velasco on 8/2/2023 with a 26 mm graft.  Postoperative period is unremarkable and she was discharged home in a stable condition.  She was readmitted on 8/31/2023 because of chest pain and increasing shortness of breath.  A CT scan of the chest showed large right-sided pleural effusion.  She underwent thoracentesis twice during the hospital stay.  She also had a small pericardial effusion.  During hospital stay she was noted to be atrial flutter and underwent FLORENTINO guided cardioversion on 9/7/2023.  She was discharged on amiodarone and metoprolol.  She was discharged again on 9/8/2023.      She was admitted again on 9/14/2023, when she presented to the emergency room with increasing shortness of breath and rapid weight gain.  She also reported sharp pain along the sternum.  This time, she was febrile with a temperature of 103 and WBC count of 15.3.  Per discharge summary, she was treated with antibiotics for pneumonia.  During the hospital stay, she had multiple episodes of orthostatic hypotension.  Beta-blockers were discontinued and midodrine was initiated.  Colchicine was initiated for possible pericarditis.  She was finally discharged home again on 9/28/2023 with home health.    She is back home for almost a week now.  Today she reports feeling much better.  Shortness of breath improved.  She has no major chest pain.  However she noticed weight gain again and is gaining 1 pound every day for the past 5 days.  She is currently taking torsemide 20 mg daily, even though she was discharged on Lasix from [facility].      Past History:      1) Status post permanent pacemaker  placement for bradycardia in ; 2) Ascending aortic aneurysm s/p surgical repair on 2023 with a 26 mm graft by Dr. Velasco. 3) Atrial fibrillation, following aneurysm surgery, status post FLORENTINO guided cardioversion.  Currently on amiodarone and Eliquis.4) Essential hypertension 5) Mixed hyperlipidemia    Medical History:  Past Medical History:   Diagnosis Date    Allergic Seasonal    Arthritis     Hands    Bradycardia     Pacemaker    Cardiac pacemaker     [employer] follows with Valayam    Cholelithiasis Removed     Depression     GERD (gastroesophageal reflux disease)     Heart murmur     HL (hearing loss)     Hyperlipidemia     Hypertension     Hypothyroidism     Obesity 1974    Pancreatitis     Seizure     Medication reaction    Thoracic aortic aneurysm     Urinary tract infection Several    Visual impairment Wear bifocals       Surgical History: has a past surgical history that includes Appendectomy; Cholecystectomy; tonsillectomy and adenoidectomy; postpartum tubal ligation;  section; Gastric bypass; Cardiac pacemaker placement; Tonsillectomy (); Colonoscopy (); Cardiac catheterization; Cardiac catheterization (N/A, 2023); Adenoidectomy (); Bariatric Surgery (); Lymph node biopsy (); and Tubal ligation ().     Family History: family history includes Anxiety disorder in her sister; Arthritis in her father and mother; Bipolar disorder in her sister; Birth defects in her daughter; Cancer in her father and mother; Dementia in her father; Depression in her mother and sister; Diabetes in her daughter; Heart disease in her mother; Hyperlipidemia in her father and mother; Hypertension in her father and mother; Kidney disease in her mother; Learning disabilities in her daughter; Liver cancer in her mother; Liver disease in her mother; Mental illness in her brother, mother, and sister; Miscarriages / Stillbirths in her daughter and sister; Schizophrenia in  her sister; Suicide Attempts in her mother; Thyroid disease in her daughter and mother; Vision loss in her maternal grandmother.     Social History: reports that she has been smoking cigarettes. She started smoking about 2 years ago. She has a 3.00 pack-year smoking history. She has never used smokeless tobacco. She reports current alcohol use of about 1.0 standard drink per week. She reports that she does not use drugs.    Allergies: Demerol [meperidine], Morphine, and Silver    Current Outpatient Medications on File Prior to Visit   Medication Sig    atorvastatin (LIPITOR) 80 MG tablet     buPROPion XL (WELLBUTRIN XL) 300 MG 24 hr tablet Take 1 tablet by mouth Daily.    coenzyme Q10 100 MG capsule Take 1 capsule by mouth Every Night.    famotidine (PEPCID) 40 MG tablet TAKE ONE TABLET BY MOUTH DAILY    ferrous sulfate (FeroSul) 325 (65 FE) MG tablet Every other day (Patient taking differently: Every other night)    levothyroxine (SYNTHROID, LEVOTHROID) 112 MCG tablet Take 1 tablet by mouth Every Morning.    sertraline (ZOLOFT) 50 MG tablet TAKE THREE TABLETS BY MOUTH DAILY    traZODone (DESYREL) 100 MG tablet TAKE ONE TABLET BY MOUTH EVERY NIGHT AT BEDTIME    [DISCONTINUED] torsemide (DEMADEX) 20 MG tablet Take 0.5 tablets by mouth Daily. (Patient taking differently: Take 0.5 tablets by mouth Every Other Day.)    amiodarone (PACERONE) 200 MG tablet Take 1 tablet by mouth Daily.    apixaban (ELIQUIS) 5 MG tablet tablet Take 1 tablet by mouth 2 (Two) Times a Day.    colchicine 0.6 MG tablet Take 1 tablet by mouth 2 (Two) Times a Day.    HYDROcodone-acetaminophen (NORCO) 5-325 MG per tablet Take 1 tablet by mouth Every 6 (Six) Hours As Needed. (Patient not taking: Reported on 10/4/2023)    metoprolol tartrate (LOPRESSOR) 25 MG tablet  (Patient not taking: Reported on 10/4/2023)    midodrine (PROAMATINE) 2.5 MG tablet Take 2 tablets by mouth 3 (Three) Times a Day Before Meals.    potassium chloride (MICRO-K) 10 MEQ CR  "capsule Take 1 capsule by mouth 2 (Two) Times a Day.     No current facility-administered medications on file prior to visit.          Review of Systems     Objective     BP 96/61 (BP Location: Left arm)   Pulse 68   Ht 170.2 cm (67\")   Wt 69.5 kg (153 lb 3.2 oz)   BMI 23.99 kg/m²       Physical Exam    General : Alert, awake, no acute distress  Neck : Supple, no carotid bruit, no jugular venous distention  CVS : Regular rate and rhythm, no murmur, rubs or gallops  Lungs: Clear to auscultation bilaterally, no crackles or rhonchi  Abdomen: Soft, nontender, bowel sounds heard in all 4 quadrants  Extremities: Warm, well-perfused, no pedal edema    Result Review :     The following data was reviewed by: Yayo Light MD on 10/04/2023:    CMP          6/20/2023    07:55 8/30/2023    10:36 8/31/2023    11:55   CMP   Glucose 97  106  125    BUN 15  8  10    Creatinine 0.83  0.80  0.83    EGFR 81.3  85.0  81.3    Sodium 141  142  139    Potassium 3.0  3.0  2.9    Chloride 101  99  94    Calcium 9.5  9.0  9.4    Total Protein  6.6  7.2    Albumin  3.6  4.1    Globulin  3.0  3.1    Total Bilirubin  0.4  0.5    Alkaline Phosphatase  97  151    AST (SGOT)  43  132    ALT (SGPT)  30  78    Albumin/Globulin Ratio  1.2  1.3    BUN/Creatinine Ratio 18.1  10.0  12.0    Anion Gap 9.4  11.5  12.9      CBC          6/20/2023    07:55 8/30/2023    10:36 8/31/2023    11:55   CBC   WBC 7.35  8.41  10.11    RBC 4.72  3.52  3.62    Hemoglobin 15.6  10.7  10.8    Hematocrit 44.7  33.5  33.7    MCV 94.7  95.2  93.1    MCH 33.1  30.4  29.8    MCHC 34.9  31.9  32.0    RDW 12.1  13.0  13.2    Platelets 237  386  352      TSH          1/13/2023    10:56   TSH   TSH 2.880             Data reviewed: Cardiology studies        Results for orders placed during the hospital encounter of 06/20/23    Adult Transthoracic Echo Complete W/ Cont if Necessary Per Protocol    Interpretation Summary    Left ventricular systolic function is normal. Left " ventricular ejection fraction appears to be 61 - 65%.    Left ventricular diastolic function is consistent with (grade I) impaired relaxation.    There are no significant valvular abnormalities.    Aneurysmal dilation of the ascending aorta is present, measuring 4.7 cm diameter.    Estimated right ventricular systolic pressure from tricuspid regurgitation is normal (<35 mmHg).    Echocardiograms done at [facility] showed    TTE complete, LVEF 55%, no pericardial effusion noted.  - Repeat TTE 9/27: EF 64 %. Normal right ventricular size and  function. No significant valvular abnormalities. Trivial to mild mitral regurgitation is present. There is mild tricuspid regurgitation. No pericardial effusion.               Assessment and Plan        Diagnoses and all orders for this visit:    1. Chronic diastolic (congestive) heart failure (Primary)  Assessment & Plan:  2 admissions for volume overload and pneumonia following aneurysmal surgery.  She is now reporting 1 pound weight gain every day for the past 5 days.  We will increase the dose of torsemide to 30 mg daily for the next 5 days, after which she can go back to 20 mg daily.  Continue potassium supplementation.  We will check BMP in 4 days.  Encouraged to follow a low-sodium diet.    Orders:  -     torsemide (DEMADEX) 20 MG tablet; Take 1 tablet by mouth Daily.  Dispense: 90 tablet; Refill: 3  -     Basic Metabolic Panel; Future    2. Aneurysm of ascending aorta without rupture  Assessment & Plan:  Recent surgical repair.  Postoperative period.  Complicated by atrial fibrillation requiring FLORENTINO guided cardioversion and readmission for pneumonia along with heart failure exacerbation.  Symptoms are better at this time.      3. Presence of cardiac pacemaker  Assessment & Plan:  Pacemaker battery is at [employer] now.  She is not pacemaker dependent.  She is scheduled for generator change later this month with Dr. Tamez.      4. Essential hypertension  Assessment &  Plan:  Blood pressure on the lower side, currently with orthostatic symptoms.  Metoprolol is on hold.  We will decrease the dose of midodrine to 5 mg twice daily.  Counseled regarding lifestyle changes.      5. Paroxysmal atrial fibrillation  Assessment & Plan:  She is status post FLORENTINO guided cardioversion.  Currently stays in sinus rhythm.  We will continue Eliquis for anticoagulation.  Continue amiodarone 200 mg daily.  Amiodarone will be continued for 3 to 6 months to maintain in sinus rhythm.  CBC will be checked with next lab draw.    Orders:  -     CBC (No Diff); Future              Follow Up     Return in about 3 months (around 1/4/2024) for Next scheduled follow up.    Patient was given instructions and counseling regarding her condition or for health maintenance advice. Please see specific information pulled into the AVS if appropriate.

## 2023-10-04 NOTE — ASSESSMENT & PLAN NOTE
2 admissions for volume overload and pneumonia following aneurysmal surgery.  She is now reporting 1 pound weight gain every day for the past 5 days.  We will increase the dose of torsemide to 30 mg daily for the next 5 days, after which she can go back to 20 mg daily.  Continue potassium supplementation.  We will check BMP in 4 days.  Encouraged to follow a low-sodium diet.

## 2023-10-04 NOTE — ASSESSMENT & PLAN NOTE
Pacemaker battery is at [employer] now.  She is not pacemaker dependent.  She is scheduled for generator change later this month with Dr. Tamez.

## 2023-10-04 NOTE — H&P (VIEW-ONLY)
CARDIOLOGY FOLLOW-UP PROGRESS NOTE        Chief Complaint  Hyperlipidemia, Hypertension, and Follow-up (Pace maker)    Subjective            Lucina Payan presents to [facility] CARDIOLOGY  History of Present Illness      Ms Payan is here for a follow-up visit.  She underwent surgical repair of ascending aortic aneurysm by Dr. Velasco on 8/2/2023 with a 26 mm graft.  Postoperative period is unremarkable and she was discharged home in a stable condition.  She was readmitted on 8/31/2023 because of chest pain and increasing shortness of breath.  A CT scan of the chest showed large right-sided pleural effusion.  She underwent thoracentesis twice during the hospital stay.  She also had a small pericardial effusion.  During hospital stay she was noted to be atrial flutter and underwent FLORENTINO guided cardioversion on 9/7/2023.  She was discharged on amiodarone and metoprolol.  She was discharged again on 9/8/2023.      She was admitted again on 9/14/2023, when she presented to the emergency room with increasing shortness of breath and rapid weight gain.  She also reported sharp pain along the sternum.  This time, she was febrile with a temperature of 103 and WBC count of 15.3.  Per discharge summary, she was treated with antibiotics for pneumonia.  During the hospital stay, she had multiple episodes of orthostatic hypotension.  Beta-blockers were discontinued and midodrine was initiated.  Colchicine was initiated for possible pericarditis.  She was finally discharged home again on 9/28/2023 with home health.    She is back home for almost a week now.  Today she reports feeling much better.  Shortness of breath improved.  She has no major chest pain.  However she noticed weight gain again and is gaining 1 pound every day for the past 5 days.  She is currently taking torsemide 20 mg daily, even though she was discharged on Lasix from [facility].      Past History:      1) Status post permanent pacemaker  placement for bradycardia in ; 2) Ascending aortic aneurysm s/p surgical repair on 2023 with a 26 mm graft by Dr. Velasco. 3) Atrial fibrillation, following aneurysm surgery, status post FLORENTINO guided cardioversion.  Currently on amiodarone and Eliquis.4) Essential hypertension 5) Mixed hyperlipidemia    Medical History:  Past Medical History:   Diagnosis Date    Allergic Seasonal    Arthritis     Hands    Bradycardia     Pacemaker    Cardiac pacemaker     [employer] follows with Valayam    Cholelithiasis Removed     Depression     GERD (gastroesophageal reflux disease)     Heart murmur     HL (hearing loss)     Hyperlipidemia     Hypertension     Hypothyroidism     Obesity 1974    Pancreatitis     Seizure     Medication reaction    Thoracic aortic aneurysm     Urinary tract infection Several    Visual impairment Wear bifocals       Surgical History: has a past surgical history that includes Appendectomy; Cholecystectomy; tonsillectomy and adenoidectomy; postpartum tubal ligation;  section; Gastric bypass; Cardiac pacemaker placement; Tonsillectomy (); Colonoscopy (); Cardiac catheterization; Cardiac catheterization (N/A, 2023); Adenoidectomy (); Bariatric Surgery (); Lymph node biopsy (); and Tubal ligation ().     Family History: family history includes Anxiety disorder in her sister; Arthritis in her father and mother; Bipolar disorder in her sister; Birth defects in her daughter; Cancer in her father and mother; Dementia in her father; Depression in her mother and sister; Diabetes in her daughter; Heart disease in her mother; Hyperlipidemia in her father and mother; Hypertension in her father and mother; Kidney disease in her mother; Learning disabilities in her daughter; Liver cancer in her mother; Liver disease in her mother; Mental illness in her brother, mother, and sister; Miscarriages / Stillbirths in her daughter and sister; Schizophrenia in  her sister; Suicide Attempts in her mother; Thyroid disease in her daughter and mother; Vision loss in her maternal grandmother.     Social History: reports that she has been smoking cigarettes. She started smoking about 2 years ago. She has a 3.00 pack-year smoking history. She has never used smokeless tobacco. She reports current alcohol use of about 1.0 standard drink per week. She reports that she does not use drugs.    Allergies: Demerol [meperidine], Morphine, and Silver    Current Outpatient Medications on File Prior to Visit   Medication Sig    atorvastatin (LIPITOR) 80 MG tablet     buPROPion XL (WELLBUTRIN XL) 300 MG 24 hr tablet Take 1 tablet by mouth Daily.    coenzyme Q10 100 MG capsule Take 1 capsule by mouth Every Night.    famotidine (PEPCID) 40 MG tablet TAKE ONE TABLET BY MOUTH DAILY    ferrous sulfate (FeroSul) 325 (65 FE) MG tablet Every other day (Patient taking differently: Every other night)    levothyroxine (SYNTHROID, LEVOTHROID) 112 MCG tablet Take 1 tablet by mouth Every Morning.    sertraline (ZOLOFT) 50 MG tablet TAKE THREE TABLETS BY MOUTH DAILY    traZODone (DESYREL) 100 MG tablet TAKE ONE TABLET BY MOUTH EVERY NIGHT AT BEDTIME    [DISCONTINUED] torsemide (DEMADEX) 20 MG tablet Take 0.5 tablets by mouth Daily. (Patient taking differently: Take 0.5 tablets by mouth Every Other Day.)    amiodarone (PACERONE) 200 MG tablet Take 1 tablet by mouth Daily.    apixaban (ELIQUIS) 5 MG tablet tablet Take 1 tablet by mouth 2 (Two) Times a Day.    colchicine 0.6 MG tablet Take 1 tablet by mouth 2 (Two) Times a Day.    HYDROcodone-acetaminophen (NORCO) 5-325 MG per tablet Take 1 tablet by mouth Every 6 (Six) Hours As Needed. (Patient not taking: Reported on 10/4/2023)    metoprolol tartrate (LOPRESSOR) 25 MG tablet  (Patient not taking: Reported on 10/4/2023)    midodrine (PROAMATINE) 2.5 MG tablet Take 2 tablets by mouth 3 (Three) Times a Day Before Meals.    potassium chloride (MICRO-K) 10 MEQ CR  "capsule Take 1 capsule by mouth 2 (Two) Times a Day.     No current facility-administered medications on file prior to visit.          Review of Systems     Objective     BP 96/61 (BP Location: Left arm)   Pulse 68   Ht 170.2 cm (67\")   Wt 69.5 kg (153 lb 3.2 oz)   BMI 23.99 kg/m²       Physical Exam    General : Alert, awake, no acute distress  Neck : Supple, no carotid bruit, no jugular venous distention  CVS : Regular rate and rhythm, no murmur, rubs or gallops  Lungs: Clear to auscultation bilaterally, no crackles or rhonchi  Abdomen: Soft, nontender, bowel sounds heard in all 4 quadrants  Extremities: Warm, well-perfused, no pedal edema    Result Review :     The following data was reviewed by: Yayo Light MD on 10/04/2023:    CMP          6/20/2023    07:55 8/30/2023    10:36 8/31/2023    11:55   CMP   Glucose 97  106  125    BUN 15  8  10    Creatinine 0.83  0.80  0.83    EGFR 81.3  85.0  81.3    Sodium 141  142  139    Potassium 3.0  3.0  2.9    Chloride 101  99  94    Calcium 9.5  9.0  9.4    Total Protein  6.6  7.2    Albumin  3.6  4.1    Globulin  3.0  3.1    Total Bilirubin  0.4  0.5    Alkaline Phosphatase  97  151    AST (SGOT)  43  132    ALT (SGPT)  30  78    Albumin/Globulin Ratio  1.2  1.3    BUN/Creatinine Ratio 18.1  10.0  12.0    Anion Gap 9.4  11.5  12.9      CBC          6/20/2023    07:55 8/30/2023    10:36 8/31/2023    11:55   CBC   WBC 7.35  8.41  10.11    RBC 4.72  3.52  3.62    Hemoglobin 15.6  10.7  10.8    Hematocrit 44.7  33.5  33.7    MCV 94.7  95.2  93.1    MCH 33.1  30.4  29.8    MCHC 34.9  31.9  32.0    RDW 12.1  13.0  13.2    Platelets 237  386  352      TSH          1/13/2023    10:56   TSH   TSH 2.880             Data reviewed: Cardiology studies        Results for orders placed during the hospital encounter of 06/20/23    Adult Transthoracic Echo Complete W/ Cont if Necessary Per Protocol    Interpretation Summary    Left ventricular systolic function is normal. Left " ventricular ejection fraction appears to be 61 - 65%.    Left ventricular diastolic function is consistent with (grade I) impaired relaxation.    There are no significant valvular abnormalities.    Aneurysmal dilation of the ascending aorta is present, measuring 4.7 cm diameter.    Estimated right ventricular systolic pressure from tricuspid regurgitation is normal (<35 mmHg).    [facility]chocardiograms done at [facility] showed    TTE complete, LVEF 55%, no pericardial effusion noted.  - Repeat TTE 9/27: EF 64 %. Normal right ventricular size and  function. No significant valvular abnormalities. Trivial to mild mitral regurgitation is present. There is mild tricuspid regurgitation. No pericardial effusion.               Assessment and Plan        Diagnoses and all orders for this visit:    1. Chronic diastolic (congestive) heart failure (Primary)  Assessment & Plan:  2 admissions for volume overload and pneumonia following aneurysmal surgery.  She is now reporting 1 pound weight gain every day for the past 5 days.  We will increase the dose of torsemide to 30 mg daily for the next 5 days, after which she can go back to 20 mg daily.  Continue potassium supplementation.  We will check BMP in 4 days.  Encouraged to follow a low-sodium diet.    Orders:  -     torsemide (DEMADEX) 20 MG tablet; Take 1 tablet by mouth Daily.  Dispense: 90 tablet; Refill: 3  -     Basic Metabolic Panel; Future    2. Aneurysm of ascending aorta without rupture  Assessment & Plan:  Recent surgical repair.  Postoperative period.  Complicated by atrial fibrillation requiring FLORENTINO guided cardioversion and readmission for pneumonia along with heart failure exacerbation.  Symptoms are better at this time.      3. Presence of cardiac pacemaker  Assessment & Plan:  Pacemaker battery is at [employer] now.  She is not pacemaker dependent.  She is scheduled for generator change later this month with Dr. Tamez.      4. Essential hypertension  Assessment &  Plan:  Blood pressure on the lower side, currently with orthostatic symptoms.  Metoprolol is on hold.  We will decrease the dose of midodrine to 5 mg twice daily.  Counseled regarding lifestyle changes.      5. Paroxysmal atrial fibrillation  Assessment & Plan:  She is status post FLORENTINO guided cardioversion.  Currently stays in sinus rhythm.  We will continue Eliquis for anticoagulation.  Continue amiodarone 200 mg daily.  Amiodarone will be continued for 3 to 6 months to maintain in sinus rhythm.  CBC will be checked with next lab draw.    Orders:  -     CBC (No Diff); Future              Follow Up     Return in about 3 months (around 1/4/2024) for Next scheduled follow up.    Patient was given instructions and counseling regarding her condition or for health maintenance advice. Please see specific information pulled into the AVS if appropriate.

## 2023-10-04 NOTE — ASSESSMENT & PLAN NOTE
Blood pressure on the lower side, currently with orthostatic symptoms.  Metoprolol is on hold.  We will decrease the dose of midodrine to 5 mg twice daily.  Counseled regarding lifestyle changes.

## 2023-10-04 NOTE — ASSESSMENT & PLAN NOTE
She is status post FLORENTINO guided cardioversion.  Currently stays in sinus rhythm.  We will continue Eliquis for anticoagulation.  Continue amiodarone 200 mg daily.  Amiodarone will be continued for 3 to 6 months to maintain in sinus rhythm.  CBC will be checked with next lab draw.

## 2023-10-04 NOTE — ASSESSMENT & PLAN NOTE
Recent surgical repair.  Postoperative period.  Complicated by atrial fibrillation requiring FLORENTINO guided cardioversion and readmission for pneumonia along with heart failure exacerbation.  Symptoms are better at this time.

## 2023-10-05 ENCOUNTER — TELEPHONE (OUTPATIENT)
Dept: CARDIOLOGY | Facility: CLINIC | Age: 58
End: 2023-10-05
Payer: COMMERCIAL

## 2023-10-09 ENCOUNTER — LAB (OUTPATIENT)
Dept: LAB | Facility: HOSPITAL | Age: 58
End: 2023-10-09
Payer: COMMERCIAL

## 2023-10-09 DIAGNOSIS — I50.32 CHRONIC DIASTOLIC (CONGESTIVE) HEART FAILURE: ICD-10-CM

## 2023-10-09 DIAGNOSIS — I48.0 PAROXYSMAL ATRIAL FIBRILLATION: ICD-10-CM

## 2023-10-09 LAB
ANION GAP SERPL CALCULATED.3IONS-SCNC: 6 MMOL/L (ref 5–15)
BUN SERPL-MCNC: 10 MG/DL (ref 6–20)
BUN/CREAT SERPL: 15.2 (ref 7–25)
CALCIUM SPEC-SCNC: 9.8 MG/DL (ref 8.6–10.5)
CHLORIDE SERPL-SCNC: 102 MMOL/L (ref 98–107)
CO2 SERPL-SCNC: 35 MMOL/L (ref 22–29)
CREAT SERPL-MCNC: 0.66 MG/DL (ref 0.57–1)
DEPRECATED RDW RBC AUTO: 52.2 FL (ref 37–54)
EGFRCR SERPLBLD CKD-EPI 2021: 101.2 ML/MIN/1.73
ERYTHROCYTE [DISTWIDTH] IN BLOOD BY AUTOMATED COUNT: 16 % (ref 12.3–15.4)
GLUCOSE SERPL-MCNC: 69 MG/DL (ref 65–99)
HCT VFR BLD AUTO: 35.3 % (ref 34–46.6)
HGB BLD-MCNC: 10.8 G/DL (ref 12–15.9)
MCH RBC QN AUTO: 27 PG (ref 26.6–33)
MCHC RBC AUTO-ENTMCNC: 30.6 G/DL (ref 31.5–35.7)
MCV RBC AUTO: 88.3 FL (ref 79–97)
PLATELET # BLD AUTO: 417 10*3/MM3 (ref 140–450)
PMV BLD AUTO: 8.7 FL (ref 6–12)
POTASSIUM SERPL-SCNC: 3.2 MMOL/L (ref 3.5–5.2)
RBC # BLD AUTO: 4 10*6/MM3 (ref 3.77–5.28)
SODIUM SERPL-SCNC: 143 MMOL/L (ref 136–145)
WBC NRBC COR # BLD: 5.43 10*3/MM3 (ref 3.4–10.8)

## 2023-10-09 PROCEDURE — 36415 COLL VENOUS BLD VENIPUNCTURE: CPT

## 2023-10-09 PROCEDURE — 85027 COMPLETE CBC AUTOMATED: CPT

## 2023-10-09 PROCEDURE — 80048 BASIC METABOLIC PNL TOTAL CA: CPT

## 2023-10-10 ENCOUNTER — TELEPHONE (OUTPATIENT)
Dept: CARDIOLOGY | Facility: CLINIC | Age: 58
End: 2023-10-10
Payer: COMMERCIAL

## 2023-10-10 NOTE — TELEPHONE ENCOUNTER
Caller: Lucina Payan    Relationship: Self    Best call back number: [phone]     What form or medical record are you requesting: WORK RELEASE    Who is requesting this form or medical record from you: [facility] [place] [facility]    How would you like to receive the form or medical records (pick-up, mail, fax):   If fax, what is the fax number: [phone]      Timeframe paperwork needed: ASAP    Additional notes: DR. UREÑA FILLED THIS OUT ALREADY BUT PUT IMMEDIATE RELEASE TO WORK, HOWEVER, PATIENT IS HAVING PACEMAKER BATTERY CHANGE ON OCTOBER 24TH. PATIENT NEEDS RELEASE TO STATE OCTOBER 30TH WITH NO RESTRICTIONS.         
I spoke to patient and printed her letter and faxed to her employer.  
Private car

## 2023-10-10 NOTE — TELEPHONE ENCOUNTER
----- Message from TISHA Villeda sent at 10/10/2023 12:23 PM EDT -----  Her potassium level is low.  Her last med list shows she is taking potassium chloride 10 mEq twice daily, would recommend to increase dose to 20 mEq 3 times daily for the next 2 days, and then after 3 days she can may take 20 mEq twice daily.  She will need to have a BMP rechecked in 1 week.

## 2023-10-11 DIAGNOSIS — E87.6 HYPOKALEMIA: Primary | ICD-10-CM

## 2023-10-11 RX ORDER — POTASSIUM CHLORIDE 1500 MG/1
20 TABLET, EXTENDED RELEASE ORAL 2 TIMES DAILY
Qty: 180 TABLET | Refills: 1 | Status: SHIPPED | OUTPATIENT
Start: 2023-10-11

## 2023-10-16 ENCOUNTER — LAB (OUTPATIENT)
Dept: LAB | Facility: HOSPITAL | Age: 58
End: 2023-10-16
Payer: COMMERCIAL

## 2023-10-16 DIAGNOSIS — E87.6 HYPOKALEMIA: ICD-10-CM

## 2023-10-16 LAB
ANION GAP SERPL CALCULATED.3IONS-SCNC: 7.9 MMOL/L (ref 5–15)
BUN SERPL-MCNC: 11 MG/DL (ref 6–20)
BUN/CREAT SERPL: 12.5 (ref 7–25)
CALCIUM SPEC-SCNC: 9.6 MG/DL (ref 8.6–10.5)
CHLORIDE SERPL-SCNC: 100 MMOL/L (ref 98–107)
CO2 SERPL-SCNC: 32.1 MMOL/L (ref 22–29)
CREAT SERPL-MCNC: 0.88 MG/DL (ref 0.57–1)
EGFRCR SERPLBLD CKD-EPI 2021: 75.8 ML/MIN/1.73
GLUCOSE SERPL-MCNC: 72 MG/DL (ref 65–99)
POTASSIUM SERPL-SCNC: 4.3 MMOL/L (ref 3.5–5.2)
SODIUM SERPL-SCNC: 140 MMOL/L (ref 136–145)

## 2023-10-16 PROCEDURE — 80048 BASIC METABOLIC PNL TOTAL CA: CPT

## 2023-10-16 PROCEDURE — 36415 COLL VENOUS BLD VENIPUNCTURE: CPT

## 2023-10-18 ENCOUNTER — TELEPHONE (OUTPATIENT)
Dept: CARDIOLOGY | Facility: CLINIC | Age: 58
End: 2023-10-18
Payer: COMMERCIAL

## 2023-10-18 NOTE — TELEPHONE ENCOUNTER
----- Message from TISHA Villeda sent at 10/18/2023  9:20 AM EDT -----  Regarding: FW: lab results  Contact: [phone]        ----- Message -----  From: Milka Urias MA  Sent: 10/18/2023   8:42 AM EDT  To: TISHA Villeda  Subject: FW: lab results                                    ----- Message -----  From: Lorenza Sánchez MA  Sent: 10/17/2023   3:27 PM EDT  To: Milka Urias MA  Subject: FW: lab results                                    ----- Message -----  From: Lucina Payan  Sent: 10/17/2023   2:53 PM EDT  To: [facility]  Subject: lab results                                      How many MG per day?

## 2023-10-19 ENCOUNTER — TELEPHONE (OUTPATIENT)
Dept: CARDIOLOGY | Facility: CLINIC | Age: 58
End: 2023-10-19
Payer: COMMERCIAL

## 2023-10-19 NOTE — TELEPHONE ENCOUNTER
I spoke to patient and gave an arrival time of 7:00 on 10/24/23 for Medtronic dual chamber pacemaker battery change. Patient was instructed to have a  for the day of the procedure and to arrive at the main entrance/registration area. Patient was instructed to hold Eliquis for 48 hours prior to procedure. Patient was instructed to continue all other medications as usual. Patient was instructed to be NPO after midnight with sips of water as needed. Patient is agreeable with no other questions or concerns.

## 2023-10-20 ENCOUNTER — HOSPITAL ENCOUNTER (OUTPATIENT)
Dept: MAMMOGRAPHY | Facility: HOSPITAL | Age: 58
Discharge: HOME | End: 2023-10-20
Admitting: FAMILY MEDICINE
Payer: COMMERCIAL

## 2023-10-20 DIAGNOSIS — Z12.31 SCREENING MAMMOGRAM FOR BREAST CANCER: ICD-10-CM

## 2023-10-20 PROCEDURE — 77063 BREAST TOMOSYNTHESIS BI: CPT

## 2023-10-20 PROCEDURE — 77067 SCR MAMMO BI INCL CAD: CPT

## 2023-10-24 ENCOUNTER — HOSPITAL ENCOUNTER (OUTPATIENT)
Facility: HOSPITAL | Age: 58
Setting detail: HOSPITAL OUTPATIENT SURGERY
Discharge: HOME | End: 2023-10-24
Attending: INTERNAL MEDICINE | Admitting: INTERNAL MEDICINE
Payer: COMMERCIAL

## 2023-10-24 VITALS
SYSTOLIC BLOOD PRESSURE: 126 MMHG | OXYGEN SATURATION: 95 % | RESPIRATION RATE: 18 BRPM | HEART RATE: 61 BPM | DIASTOLIC BLOOD PRESSURE: 72 MMHG

## 2023-10-24 DIAGNOSIS — Z45.010 PACEMAKER AT END OF BATTERY LIFE: ICD-10-CM

## 2023-10-24 DIAGNOSIS — I10 ESSENTIAL HYPERTENSION: ICD-10-CM

## 2023-10-24 LAB
ANION GAP SERPL CALCULATED.3IONS-SCNC: 9.5 MMOL/L (ref 5–15)
BASOPHILS # BLD AUTO: 0.09 10*3/MM3 (ref 0–0.2)
BASOPHILS NFR BLD AUTO: 1.4 % (ref 0–1.5)
BUN SERPL-MCNC: 17 MG/DL (ref 8–23)
BUN/CREAT SERPL: 23.9 (ref 7–25)
CALCIUM SPEC-SCNC: 9.5 MG/DL (ref 8.6–10.5)
CHLORIDE SERPL-SCNC: 103 MMOL/L (ref 98–107)
CO2 SERPL-SCNC: 28.5 MMOL/L (ref 22–29)
CREAT SERPL-MCNC: 0.71 MG/DL (ref 0.57–1)
DEPRECATED RDW RBC AUTO: 57 FL (ref 37–54)
EGFRCR SERPLBLD CKD-EPI 2021: 97.5 ML/MIN/1.73
EOSINOPHIL # BLD AUTO: 0.14 10*3/MM3 (ref 0–0.4)
EOSINOPHIL NFR BLD AUTO: 2.2 % (ref 0.3–6.2)
ERYTHROCYTE [DISTWIDTH] IN BLOOD BY AUTOMATED COUNT: 17.8 % (ref 12.3–15.4)
GLUCOSE SERPL-MCNC: 88 MG/DL (ref 65–99)
HCT VFR BLD AUTO: 37.6 % (ref 34–46.6)
HGB BLD-MCNC: 11.6 G/DL (ref 12–15.9)
IMM GRANULOCYTES # BLD AUTO: 0.01 10*3/MM3 (ref 0–0.05)
IMM GRANULOCYTES NFR BLD AUTO: 0.2 % (ref 0–0.5)
INR PPP: 0.99 (ref 0.86–1.15)
LYMPHOCYTES # BLD AUTO: 2 10*3/MM3 (ref 0.7–3.1)
LYMPHOCYTES NFR BLD AUTO: 31 % (ref 19.6–45.3)
MCH RBC QN AUTO: 26.7 PG (ref 26.6–33)
MCHC RBC AUTO-ENTMCNC: 30.9 G/DL (ref 31.5–35.7)
MCV RBC AUTO: 86.6 FL (ref 79–97)
MONOCYTES # BLD AUTO: 0.58 10*3/MM3 (ref 0.1–0.9)
MONOCYTES NFR BLD AUTO: 9 % (ref 5–12)
NEUTROPHILS NFR BLD AUTO: 3.63 10*3/MM3 (ref 1.7–7)
NEUTROPHILS NFR BLD AUTO: 56.2 % (ref 42.7–76)
NRBC BLD AUTO-RTO: 0 /100 WBC (ref 0–0.2)
PLATELET # BLD AUTO: 330 10*3/MM3 (ref 140–450)
PMV BLD AUTO: 10.5 FL (ref 6–12)
POTASSIUM SERPL-SCNC: 3.7 MMOL/L (ref 3.5–5.2)
PROTHROMBIN TIME: 13.2 SECONDS (ref 11.8–14.9)
RBC # BLD AUTO: 4.34 10*6/MM3 (ref 3.77–5.28)
SODIUM SERPL-SCNC: 141 MMOL/L (ref 136–145)
WBC NRBC COR # BLD: 6.45 10*3/MM3 (ref 3.4–10.8)

## 2023-10-24 PROCEDURE — 93005 ELECTROCARDIOGRAM TRACING: CPT | Performed by: NURSE PRACTITIONER

## 2023-10-24 PROCEDURE — 80048 BASIC METABOLIC PNL TOTAL CA: CPT | Performed by: NURSE PRACTITIONER

## 2023-10-24 PROCEDURE — 85025 COMPLETE CBC W/AUTO DIFF WBC: CPT | Performed by: NURSE PRACTITIONER

## 2023-10-24 PROCEDURE — 25010000002 MIDAZOLAM PER 1MG: Performed by: INTERNAL MEDICINE

## 2023-10-24 PROCEDURE — 85610 PROTHROMBIN TIME: CPT | Performed by: NURSE PRACTITIONER

## 2023-10-24 PROCEDURE — 33228 REMV&REPLC PM GEN DUAL LEAD: CPT | Performed by: INTERNAL MEDICINE

## 2023-10-24 PROCEDURE — 25010000002 FENTANYL CITRATE (PF) 50 MCG/ML SOLUTION: Performed by: INTERNAL MEDICINE

## 2023-10-24 PROCEDURE — C1785 PMKR, DUAL, RATE-RESP: HCPCS | Performed by: INTERNAL MEDICINE

## 2023-10-24 PROCEDURE — 25010000002 BUPIVACAINE (PF) 0.5 % SOLUTION: Performed by: INTERNAL MEDICINE

## 2023-10-24 PROCEDURE — 25010000002 CEFAZOLIN IN DEXTROSE 2000 MG/ 100 ML SOLUTION: Performed by: INTERNAL MEDICINE

## 2023-10-24 PROCEDURE — 80061 LIPID PANEL: CPT | Performed by: FAMILY MEDICINE

## 2023-10-24 DEVICE — GEN PM AZURE XT SURESCAN DR MRI: Type: IMPLANTABLE DEVICE | Status: FUNCTIONAL

## 2023-10-24 RX ORDER — SODIUM CHLORIDE 9 MG/ML
40 INJECTION, SOLUTION INTRAVENOUS AS NEEDED
Status: DISCONTINUED | OUTPATIENT
Start: 2023-10-24 | End: 2023-10-24 | Stop reason: HOSPADM

## 2023-10-24 RX ORDER — FENTANYL CITRATE 50 UG/ML
INJECTION, SOLUTION INTRAMUSCULAR; INTRAVENOUS
Status: DISCONTINUED | OUTPATIENT
Start: 2023-10-24 | End: 2023-10-24 | Stop reason: HOSPADM

## 2023-10-24 RX ORDER — BUPIVACAINE HYDROCHLORIDE 5 MG/ML
INJECTION, SOLUTION EPIDURAL; INTRACAUDAL
Status: DISCONTINUED | OUTPATIENT
Start: 2023-10-24 | End: 2023-10-24 | Stop reason: HOSPADM

## 2023-10-24 RX ORDER — CEPHALEXIN 500 MG/1
500 CAPSULE ORAL 3 TIMES DAILY
Qty: 12 CAPSULE | Refills: 0 | Status: SHIPPED | OUTPATIENT
Start: 2023-10-24

## 2023-10-24 RX ORDER — LIDOCAINE HYDROCHLORIDE 20 MG/ML
INJECTION, SOLUTION INFILTRATION; PERINEURAL
Status: DISCONTINUED | OUTPATIENT
Start: 2023-10-24 | End: 2023-10-24 | Stop reason: HOSPADM

## 2023-10-24 RX ORDER — SODIUM CHLORIDE 0.9 % (FLUSH) 0.9 %
10 SYRINGE (ML) INJECTION AS NEEDED
Status: DISCONTINUED | OUTPATIENT
Start: 2023-10-24 | End: 2023-10-24 | Stop reason: HOSPADM

## 2023-10-24 RX ORDER — MIDAZOLAM HYDROCHLORIDE 2 MG/2ML
INJECTION, SOLUTION INTRAMUSCULAR; INTRAVENOUS
Status: DISCONTINUED | OUTPATIENT
Start: 2023-10-24 | End: 2023-10-24 | Stop reason: HOSPADM

## 2023-10-24 RX ORDER — CEFAZOLIN SODIUM 2 G/100ML
2000 INJECTION, SOLUTION INTRAVENOUS ONCE
Status: DISCONTINUED | OUTPATIENT
Start: 2023-10-24 | End: 2023-10-24 | Stop reason: HOSPADM

## 2023-10-24 RX ORDER — SODIUM CHLORIDE 0.9 % (FLUSH) 0.9 %
10 SYRINGE (ML) INJECTION EVERY 12 HOURS SCHEDULED
Status: DISCONTINUED | OUTPATIENT
Start: 2023-10-24 | End: 2023-10-24 | Stop reason: HOSPADM

## 2023-10-24 NOTE — NURSING NOTE
Patient returned from procedure stable. Patient was monitored for 2hrs while sedation wore off. VSS. No oozing or swelling at site.Patient was educated on discharge instructions. Patient verbalized understanding. IV removed.

## 2023-10-25 ENCOUNTER — OFFICE VISIT (OUTPATIENT)
Dept: FAMILY MEDICINE CLINIC | Age: 58
End: 2023-10-25
Payer: COMMERCIAL

## 2023-10-25 VITALS
WEIGHT: 144 LBS | HEART RATE: 60 BPM | DIASTOLIC BLOOD PRESSURE: 76 MMHG | SYSTOLIC BLOOD PRESSURE: 118 MMHG | BODY MASS INDEX: 22.6 KG/M2 | HEIGHT: 67 IN | OXYGEN SATURATION: 97 %

## 2023-10-25 DIAGNOSIS — I49.5 SSS (SICK SINUS SYNDROME): ICD-10-CM

## 2023-10-25 DIAGNOSIS — I48.0 PAROXYSMAL ATRIAL FIBRILLATION: Primary | ICD-10-CM

## 2023-10-25 DIAGNOSIS — Z23 ENCOUNTER FOR IMMUNIZATION: ICD-10-CM

## 2023-10-25 DIAGNOSIS — I10 ESSENTIAL HYPERTENSION: ICD-10-CM

## 2023-10-25 DIAGNOSIS — I50.32 CHRONIC DIASTOLIC (CONGESTIVE) HEART FAILURE: ICD-10-CM

## 2023-10-25 DIAGNOSIS — I71.21 ANEURYSM OF ASCENDING AORTA WITHOUT RUPTURE: ICD-10-CM

## 2023-10-25 PROBLEM — R07.9 CHEST PAIN: Status: RESOLVED | Noted: 2023-08-31 | Resolved: 2023-10-25

## 2023-10-25 PROBLEM — R00.2 PALPITATIONS: Status: RESOLVED | Noted: 2023-08-31 | Resolved: 2023-10-25

## 2023-10-25 PROBLEM — R06.02 SHORTNESS OF BREATH: Status: RESOLVED | Noted: 2023-08-31 | Resolved: 2023-10-25

## 2023-10-25 LAB
CHOLEST SERPL-MCNC: 120 MG/DL (ref 0–200)
HDLC SERPL-MCNC: 59 MG/DL (ref 40–60)
LDLC SERPL CALC-MCNC: 50 MG/DL (ref 0–100)
LDLC/HDLC SERPL: 0.87 {RATIO}
QT INTERVAL: 429 MS
QTC INTERVAL: 468 MS
TRIGL SERPL-MCNC: 48 MG/DL (ref 0–150)
VLDLC SERPL-MCNC: 11 MG/DL (ref 5–40)

## 2023-10-25 RX ORDER — ASPIRIN 81 MG/1
81 TABLET, COATED ORAL DAILY
COMMUNITY
Start: 2023-10-05

## 2023-10-25 NOTE — ASSESSMENT & PLAN NOTE
Now on torsemide 20 mg daily with potassium chloride 20 mEq twice daily.  She did have some hypokalemia previously but that has normalized and she is running in a normal range on her most recent lab checks.

## 2023-10-25 NOTE — ASSESSMENT & PLAN NOTE
BP has been pretty stable since discharge.  She continues on metoprolol tartrate 25 mg twice daily in addition to the midodrine, which has been decreased to 5 mg twice daily by Cardiology.  Continue to monitor closely.  Lipid panel added to labs drawn yesterday as she is due for this for her yearly labs.

## 2023-10-25 NOTE — PROGRESS NOTES
Chief Complaint  Hospital Follow Up Visit    Subjective          Lucina Payan presents to [facility] today for follow-up after recent cardiac procedures.    She underwent ascending aortic aneurysm repair by Dr. Velasco 8/2/2023.  She returned to the hospital on 8/31/2023 due to chest pain and shortness of breath.  CT showed large right-sided pleural effusion requiring thoracentesis x2.  Small pericardial effusion was also noted.  She was found to be in atrial fibrillation for which she underwent FLORENTINO guided cardioversion on 9/7 and was started on amiodarone and metoprolol for rhythm and rate control.  She was discharged on 9/8/2023.  She returned again to the hospital 6 days later with shortness of breath and rapid weight gain as well as sharp chest pain.  She was diagnosed with and treated for pneumonia.  She developed orthostatic hypotension repeatedly during admission so her metoprolol was discontinued and she was started on midodrine.  She was also started on colchicine for possible pericarditis.  Finally, she was discharged home again on 9/28.  She has been following closely with Dr. Light and the [facility] group since that time.    Since discharge, she was started on torsemide to control volume status due to chronic diastolic heart failure.  This did cause her to become slightly hypokalemic so her potassium supplementation was increased.  She does have pacemaker (placed and 2013) underwent battery change with Dr. Tamez yesterday.    She continues on amiodarone for rhythm control of the atrial fibrillation along with Eliquis twice daily for anticoagulation.  She is still taking her beta-blocker with metoprolol tartrate at this time for rate control of A-fib but also continues on the midodrine 5 mg twice daily for blood pressure support.  She continues on aspirin and atorvastatin.  She has another couple weeks of the colchicine to complete.  She continues on the  torsemide 20 mg daily at this time with potassium chloride 20 mEq twice daily.        Current Outpatient Medications:     amiodarone (PACERONE) 200 MG tablet, Take 1 tablet by mouth Daily., Disp: , Rfl:     apixaban (ELIQUIS) 5 MG tablet tablet, Take 1 tablet by mouth 2 (Two) Times a Day., Disp: , Rfl:     Aspirin Low Dose 81 MG EC tablet, Take 1 tablet by mouth Daily., Disp: , Rfl:     atorvastatin (LIPITOR) 80 MG tablet, , Disp: , Rfl:     buPROPion XL (WELLBUTRIN XL) 300 MG 24 hr tablet, Take 1 tablet by mouth Daily., Disp: 90 tablet, Rfl: 1    cephalexin (Keflex) 500 MG capsule, Take 1 capsule by mouth 3 (Three) Times a Day., Disp: 12 capsule, Rfl: 0    coenzyme Q10 100 MG capsule, Take 1 capsule by mouth Every Night., Disp: , Rfl:     colchicine 0.6 MG tablet, Take 1 tablet by mouth 2 (Two) Times a Day., Disp: , Rfl:     famotidine (PEPCID) 40 MG tablet, TAKE ONE TABLET BY MOUTH DAILY, Disp: 90 tablet, Rfl: 1    ferrous sulfate (FeroSul) 325 (65 FE) MG tablet, Every other day (Patient taking differently: Every other night), Disp: 90 tablet, Rfl: 1    levothyroxine (SYNTHROID, LEVOTHROID) 112 MCG tablet, Take 1 tablet by mouth Every Morning., Disp: 90 tablet, Rfl: 0    metoprolol tartrate (LOPRESSOR) 25 MG tablet, , Disp: , Rfl:     midodrine (PROAMATINE) 2.5 MG tablet, Take 2 tablets by mouth 2 (Two) Times a Day., Disp: , Rfl:     potassium chloride (K-TAB) 20 MEQ tablet controlled-release ER tablet, Take 1 tablet by mouth 2 (Two) Times a Day., Disp: 180 tablet, Rfl: 1    sertraline (ZOLOFT) 50 MG tablet, TAKE THREE TABLETS BY MOUTH DAILY, Disp: 90 tablet, Rfl: 1    torsemide (DEMADEX) 20 MG tablet, Take 1 tablet by mouth Daily., Disp: 90 tablet, Rfl: 3    traZODone (DESYREL) 100 MG tablet, TAKE ONE TABLET BY MOUTH EVERY NIGHT AT BEDTIME, Disp: 90 tablet, Rfl: 1  Medications Discontinued During This Encounter   Medication Reason    HYDROcodone-acetaminophen (NORCO) 5-325 MG per tablet *Therapy completed  "        Allergies:  Demerol [meperidine], Morphine, Silver, and Lisinopril      Objective   Vital Signs:   Vitals:    10/25/23 1430   BP: 118/76   BP Location: Left arm   Patient Position: Sitting   Cuff Size: Adult   Pulse: 60   SpO2: 97%   Weight: 65.3 kg (144 lb)   Height: 170.2 cm (67.01\")         Physical Exam  Vitals reviewed.   Constitutional:       General: She is not in acute distress.     Appearance: Normal appearance. She is well-developed.   HENT:      Head: Normocephalic and atraumatic.      Right Ear: External ear normal.      Left Ear: External ear normal.   Eyes:      Extraocular Movements: Extraocular movements intact.      Conjunctiva/sclera: Conjunctivae normal.      Pupils: Pupils are equal, round, and reactive to light.   Cardiovascular:      Rate and Rhythm: Normal rate and regular rhythm.      Heart sounds: No murmur heard.  Pulmonary:      Effort: Pulmonary effort is normal.      Breath sounds: Normal breath sounds. No wheezing, rhonchi or rales.   Abdominal:      General: Bowel sounds are normal. There is no distension.      Palpations: Abdomen is soft.      Tenderness: There is no abdominal tenderness.   Musculoskeletal:         General: Normal range of motion.   Neurological:      Mental Status: She is alert.   Psychiatric:         Mood and Affect: Affect normal.           Lab Results   Component Value Date    GLUCOSE 88 10/24/2023    BUN 17 10/24/2023    CREATININE 0.71 10/24/2023    EGFRIFNONA 76 10/15/2021    BCR 23.9 10/24/2023    K 3.7 10/24/2023    CO2 28.5 10/24/2023    CALCIUM 9.5 10/24/2023    ALBUMIN 4.1 08/31/2023    LABIL2 1.4 03/18/2021     (H) 08/31/2023    ALT 78 (H) 08/31/2023       Lab Results   Component Value Date    CHOL 120 10/24/2023    CHLPL 161 03/18/2021    TRIG 48 10/24/2023    HDL 59 10/24/2023    LDL 50 10/24/2023            Assessment and Plan    Diagnoses and all orders for this visit:    1. Paroxysmal atrial fibrillation (Primary)  Assessment & " Plan:  Currently on amiodarone 200 mg daily for rhythm control and metoprolol tartrate 25 mg twice daily for rate control.  Blood pressures supporting the beta-blocker with alongside use of midodrine.  Status post pacemaker placement in 2013 with battery change yesterday by Dr. Tamez Cardiology.  She is following with Dr. Light and goes back to see him in January.  Doing well since the battery changed yesterday.      2. Chronic diastolic (congestive) heart failure  Assessment & Plan:  Now on torsemide 20 mg daily with potassium chloride 20 mEq twice daily.  She did have some hypokalemia previously but that has normalized and she is running in a normal range on her most recent lab checks.      3. SSS (sick sinus syndrome)  Overview:  Following with Cardiology.  Status post pacemaker placement with battery change 10/20/2023.      4. Aneurysm of ascending aorta without rupture  Overview:  Ascending aortic aneurysm s/p surgical repair on 8/2/2023 with a 26 mm graft by Dr. Velasco      5. Essential hypertension  Assessment & Plan:  BP has been pretty stable since discharge.  She continues on metoprolol tartrate 25 mg twice daily in addition to the midodrine, which has been decreased to 5 mg twice daily by Cardiology.  Continue to monitor closely.  Lipid panel added to labs drawn yesterday as she is due for this for her yearly labs.    Orders:  -     Lipid Panel; Future    6. Encounter for immunization  -     Fluzone (or Fluarix & Flulaval for VFC) >6mos  -     Pneumococcal Conjugate Vaccine 20-Valent All        Follow Up   Return if symptoms worsen or fail to improve, for Or as scheduled 1/25/2024.  Patient was given instructions and counseling regarding her condition or for health maintenance advice. Please see specific information pulled into the AVS if appropriate.           10/25/2023

## 2023-10-25 NOTE — ASSESSMENT & PLAN NOTE
Currently on amiodarone 200 mg daily for rhythm control and metoprolol tartrate 25 mg twice daily for rate control.  Blood pressures supporting the beta-blocker with alongside use of midodrine.  Status post pacemaker placement in 2013 with battery change yesterday by Dr. Tamez Cardiology.  She is following with Dr. Light and goes back to see him in January.  Doing well since the battery changed yesterday.

## 2023-11-13 ENCOUNTER — TELEPHONE (OUTPATIENT)
Dept: CARDIOLOGY | Facility: CLINIC | Age: 58
End: 2023-11-13
Payer: COMMERCIAL

## 2023-11-13 NOTE — TELEPHONE ENCOUNTER
Patient called stating she completed her course of colchicine about a week ago, on Saturday she started having he achiness around her heart just like last time. Patient denies SOA, but states it is difficult to take deep breaths. Patient wanting to know if she can get a new prescription?     Please advise.     Patient states if she becomes SOA she will go get evaluated.

## 2023-11-14 RX ORDER — COLCHICINE 0.6 MG/1
0.6 TABLET ORAL 2 TIMES DAILY
Qty: 60 TABLET | Refills: 1 | Status: SHIPPED | OUTPATIENT
Start: 2023-11-14

## 2023-11-14 RX ORDER — COLCHICINE 0.6 MG/1
0.6 TABLET ORAL 2 TIMES DAILY
Qty: 60 TABLET | Refills: 1 | Status: SHIPPED | OUTPATIENT
Start: 2023-11-14 | End: 2023-11-14 | Stop reason: SDUPTHER

## 2023-11-14 NOTE — TELEPHONE ENCOUNTER
Yes recommend restarting colchicine.  Please send a prescription in for colchicine 0.6 mg twice daily, dispense 30, refill 1.  This will let her complete at least 3 full months of colchicine.  If after restarting the medication she does not see improvement in the next 2 to 3 days, she should let us know.

## 2023-11-14 NOTE — TELEPHONE ENCOUNTER
BABAK patient. Went over recommendations and medicaitons. Patient verbalized understanding and appreciation.     Verified with TISHA Villeda: colchicine 0.6 mg BID for 30 days with one refill.

## 2023-12-10 DIAGNOSIS — F33.1 MODERATE EPISODE OF RECURRENT MAJOR DEPRESSIVE DISORDER: ICD-10-CM

## 2023-12-11 RX ORDER — LEVOTHYROXINE SODIUM 112 UG/1
112 TABLET ORAL EVERY MORNING
Qty: 90 TABLET | Refills: 0 | Status: SHIPPED | OUTPATIENT
Start: 2023-12-11

## 2023-12-26 RX ORDER — BUPROPION HYDROCHLORIDE 300 MG/1
300 TABLET ORAL DAILY
Qty: 90 TABLET | Refills: 1 | Status: SHIPPED | OUTPATIENT
Start: 2023-12-26

## 2024-01-01 NOTE — ASSESSMENT & PLAN NOTE
- Instructed in strategies for functional positioning for cervical stretching/strengthening during the daily routine, including: holding, car seat set up, bottle/spoon feeding, and play     - Discussed taking a picture of the top of the child's head 1x/month to note head shape changes     - Feed her in your right arm so she looks left   BP at goal.  Continue irbesartan 150 mg daily and metoprolol succinate 25 mg daily.  No refills needed.  Checking labs.

## 2024-01-09 ENCOUNTER — TRANSCRIBE ORDERS (OUTPATIENT)
Dept: ADMINISTRATIVE | Facility: HOSPITAL | Age: 59
End: 2024-01-09
Payer: COMMERCIAL

## 2024-01-09 DIAGNOSIS — I71.21 ANEURYSM OF THE ASCENDING AORTA, WITHOUT RUPTURE: Primary | ICD-10-CM

## 2024-01-17 ENCOUNTER — OFFICE VISIT (OUTPATIENT)
Dept: CARDIOLOGY | Facility: CLINIC | Age: 59
End: 2024-01-17
Payer: COMMERCIAL

## 2024-01-17 VITALS
HEART RATE: 59 BPM | DIASTOLIC BLOOD PRESSURE: 78 MMHG | SYSTOLIC BLOOD PRESSURE: 136 MMHG | BODY MASS INDEX: 25.27 KG/M2 | HEIGHT: 67 IN | WEIGHT: 161 LBS

## 2024-01-17 DIAGNOSIS — I50.32 CHRONIC DIASTOLIC (CONGESTIVE) HEART FAILURE: ICD-10-CM

## 2024-01-17 DIAGNOSIS — I71.21 ANEURYSM OF ASCENDING AORTA WITHOUT RUPTURE: ICD-10-CM

## 2024-01-17 DIAGNOSIS — I10 ESSENTIAL HYPERTENSION: ICD-10-CM

## 2024-01-17 DIAGNOSIS — I48.0 PAROXYSMAL ATRIAL FIBRILLATION: Primary | ICD-10-CM

## 2024-01-17 DIAGNOSIS — E78.2 MIXED HYPERLIPIDEMIA: ICD-10-CM

## 2024-01-17 RX ORDER — ATORVASTATIN CALCIUM 40 MG/1
40 TABLET, FILM COATED ORAL NIGHTLY
Qty: 90 TABLET | Refills: 1 | Status: SHIPPED | OUTPATIENT
Start: 2024-01-17

## 2024-01-17 RX ORDER — MIDODRINE HYDROCHLORIDE 2.5 MG/1
2.5 TABLET ORAL 2 TIMES DAILY
Qty: 180 TABLET | Refills: 1 | Status: SHIPPED | OUTPATIENT
Start: 2024-01-17

## 2024-01-17 NOTE — ASSESSMENT & PLAN NOTE
S/p surgical repair, recovering well at this time.  She has follow-ups with cardiothoracic surgeons soon.

## 2024-01-17 NOTE — ASSESSMENT & PLAN NOTE
She is clinically not volume overloaded, pedal edema subsided.  Continue torsemide 20 mg along with potassium supplementation.  Will repeat basic metabolic panel before next visit.

## 2024-01-17 NOTE — ASSESSMENT & PLAN NOTE
She is remaining sinus rhythm for the past 5 to 6 months.  She has not taken amiodarone for more than a week since she ran out of refills.  At this time, will discontinue amiodarone permanently to avoid long-term side effects.  Continue Eliquis for anticoagulation.  Will adjust dose of metoprolol to tartrate to 12.5 mg twice daily.  Will consider stopping Eliquis during subsequent follow-up visits since the episode was triggered by recent surgery.

## 2024-01-17 NOTE — ASSESSMENT & PLAN NOTE
Blood pressure well-controlled.  She is currently on midodrine due to low blood pressure detected at the time of hospitalization.  Will decrease midodrine to 2.5 mg twice daily now.  Planning to stop during next follow-up visit.  Continue metoprolol 12.5 mg twice daily.

## 2024-01-17 NOTE — PROGRESS NOTES
CARDIOLOGY FOLLOW-UP PROGRESS NOTE        Chief Complaint  Congestive Heart Failure, presence of cardiac pacemaker, and Hyperlipidemia    Subjective            Lucina Payan presents to [facility]  History of Present Illness    Ms Payan is here for a follow-up visit.  She was last seen in the office on 10/4/2023.  Today patient reports feeling much better and almost back to her baseline.  She has no pedal edema at this time.  No recent weight gain.  Shortness of breath is back to her baseline.  Denies palpitations or dizziness.  She ran out of amiodarone and atorvastatin a week back and could not get refills.       Past History:    1) Status post permanent pacemaker placement for bradycardia in ; 2) Ascending aortic aneurysm s/p surgical repair on 2023 with a 26 mm graft by Dr. Velasco. 3) Atrial fibrillation, following aneurysm surgery, status post FLORENTINO guided cardioversion.  Currently on amiodarone and Eliquis.4) Essential hypertension 5) Mixed hyperlipidemia     Medical History:  Past Medical History:   Diagnosis Date    Allergic Seasonal    Arthritis     Hands    Bradycardia     Pacemaker    Cardiac pacemaker     Medtronic    Cholelithiasis Removed     Depression     GERD (gastroesophageal reflux disease)     Heart murmur     HL (hearing loss)     Hyperlipidemia     Hypertension     Hypothyroidism     Obesity 1974    Pancreatitis     Seizure     Medication reaction    SSS (sick sinus syndrome)     Thoracic aortic aneurysm     Urinary tract infection Several    Visual impairment Wear bifocals       Surgical History: has a past surgical history that includes Appendectomy; Cholecystectomy; tonsillectomy and adenoidectomy; postpartum tubal ligation;  section; Gastric bypass; Cardiac pacemaker placement; Tonsillectomy (); Colonoscopy (); Cardiac catheterization; Cardiac catheterization (N/A, 2023); Adenoidectomy (32712); Bariatric Surgery  (2003); Lymph node biopsy (2004); Tubal ligation (1995); and Pacemaker Replacement (N/A, 10/24/2023).     Family History: family history includes Anxiety disorder in her sister; Arthritis in her father and mother; Bipolar disorder in her sister; Birth defects in her daughter; Cancer in her father and mother; Dementia in her father; Depression in her mother and sister; Diabetes in her daughter; Heart disease in her mother; Hyperlipidemia in her father and mother; Hypertension in her father and mother; Kidney disease in her mother; Learning disabilities in her daughter; Liver cancer in her mother; Liver disease in her mother; Mental illness in her brother, mother, and sister; Miscarriages / Stillbirths in her daughter and sister; Schizophrenia in her sister; Suicide Attempts in her mother; Thyroid disease in her daughter and mother; Vision loss in her maternal grandmother.     Social History: reports that she quit smoking about 5 months ago. Her smoking use included cigarettes. She started smoking about 2 years ago. She has a 3.00 pack-year smoking history. She has never used smokeless tobacco. She reports current alcohol use of about 1.0 standard drink of alcohol per week. She reports that she does not use drugs.    Allergies: Demerol [meperidine], Morphine, Silver, and Lisinopril    Current Outpatient Medications on File Prior to Visit   Medication Sig    apixaban (ELIQUIS) 5 MG tablet tablet Take 1 tablet by mouth 2 (Two) Times a Day.    Aspirin Low Dose 81 MG EC tablet Take 1 tablet by mouth Daily.    buPROPion XL (WELLBUTRIN XL) 300 MG 24 hr tablet TAKE 1 TABLET BY MOUTH DAILY    coenzyme Q10 100 MG capsule Take 1 capsule by mouth Every Night.    colchicine 0.6 MG tablet Take 1 tablet by mouth 2 (Two) Times a Day.    famotidine (PEPCID) 40 MG tablet TAKE ONE TABLET BY MOUTH DAILY    ferrous sulfate (FeroSul) 325 (65 FE) MG tablet Every other day (Patient taking differently: Every other night)    levothyroxine  "(SYNTHROID, LEVOTHROID) 112 MCG tablet Take 1 tablet by mouth Every Morning.    potassium chloride (K-TAB) 20 MEQ tablet controlled-release ER tablet Take 1 tablet by mouth 2 (Two) Times a Day.    sertraline (ZOLOFT) 50 MG tablet TAKE THREE TABLETS BY MOUTH DAILY    torsemide (DEMADEX) 20 MG tablet Take 1 tablet by mouth Daily.    traZODone (DESYREL) 100 MG tablet TAKE ONE TABLET BY MOUTH EVERY NIGHT AT BEDTIME    [DISCONTINUED] amiodarone (PACERONE) 200 MG tablet Take 1 tablet by mouth Daily.    atorvastatin (LIPITOR) 80 MG tablet     metoprolol tartrate (LOPRESSOR) 25 MG tablet     midodrine (PROAMATINE) 2.5 MG tablet Take 2 tablets by mouth 2 (Two) Times a Day.         Review of Systems   Respiratory:  Negative for cough, shortness of breath and wheezing.    Cardiovascular:  Negative for chest pain, palpitations and leg swelling.   Gastrointestinal:  Negative for nausea and vomiting.   Neurological:  Negative for dizziness and syncope.        Objective     /78   Pulse 59   Ht 170.2 cm (67\")   Wt 73 kg (161 lb)   BMI 25.22 kg/m²       Physical Exam    General : Alert, awake, no acute distress  Neck : Supple, no carotid bruit, no jugular venous distention  CVS : Regular rate and rhythm, no murmur, rubs or gallops  Lungs: Clear to auscultation bilaterally, no crackles or rhonchi  Abdomen: Soft, nontender, bowel sounds heard in all 4 quadrants  Extremities: Warm, well-perfused, no pedal edema    Result Review :     The following data was reviewed by: Yayo Light MD on 01/17/2024:    CMP          10/9/2023    15:36 10/16/2023    14:12 10/24/2023    07:43   CMP   Glucose 69  72  88    BUN 10  11  17    Creatinine 0.66  0.88  0.71    EGFR 101.2  75.8  97.5    Sodium 143  140  141    Potassium 3.2  4.3  3.7    Chloride 102  100  103    Calcium 9.8  9.6  9.5    BUN/Creatinine Ratio 15.2  12.5  23.9    Anion Gap 6.0  7.9  9.5      CBC          8/31/2023    11:55 10/9/2023    15:36 10/24/2023    07:43   CBC "   WBC 10.11  5.43  6.45    RBC 3.62  4.00  4.34    Hemoglobin 10.8  10.8  11.6    Hematocrit 33.7  35.3  37.6    MCV 93.1  88.3  86.6    MCH 29.8  27.0  26.7    MCHC 32.0  30.6  30.9    RDW 13.2  16.0  17.8    Platelets 352  417  330        Lipid Panel          10/24/2023    07:43   Lipid Panel   Total Cholesterol 120    Triglycerides 48    HDL Cholesterol 59    VLDL Cholesterol 11    LDL Cholesterol  50    LDL/HDL Ratio 0.87           Data reviewed: Cardiology studies        Results for orders placed during the hospital encounter of 06/20/23    Adult Transthoracic Echo Complete W/ Cont if Necessary Per Protocol    Interpretation Summary    Left ventricular systolic function is normal. Left ventricular ejection fraction appears to be 61 - 65%.    Left ventricular diastolic function is consistent with (grade I) impaired relaxation.    There are no significant valvular abnormalities.    Aneurysmal dilation of the ascending aorta is present, measuring 4.7 cm diameter.    Estimated right ventricular systolic pressure from tricuspid regurgitation is normal (<35 mmHg).                   Assessment and Plan        Diagnoses and all orders for this visit:    1. Paroxysmal atrial fibrillation (Primary)  Assessment & Plan:  She is remaining sinus rhythm for the past 5 to 6 months.  She has not taken amiodarone for more than a week since she ran out of refills.  At this time, will discontinue amiodarone permanently to avoid long-term side effects.  Continue Eliquis for anticoagulation.  Will adjust dose of metoprolol to tartrate to 12.5 mg twice daily.  Will consider stopping Eliquis during subsequent follow-up visits since the episode was triggered by recent surgery.    Orders:  -     metoprolol tartrate (LOPRESSOR) 25 MG tablet; Take 0.5 tablets by mouth 2 (Two) Times a Day.  Dispense: 45 tablet; Refill: 1  -     CBC (No Diff); Future    2. Essential hypertension  Assessment & Plan:  Blood pressure well-controlled.  She is  currently on midodrine due to low blood pressure detected at the time of hospitalization.  Will decrease midodrine to 2.5 mg twice daily now.  Planning to stop during next follow-up visit.  Continue metoprolol 12.5 mg twice daily.    Orders:  -     metoprolol tartrate (LOPRESSOR) 25 MG tablet; Take 0.5 tablets by mouth 2 (Two) Times a Day.  Dispense: 45 tablet; Refill: 1  -     midodrine (PROAMATINE) 2.5 MG tablet; Take 1 tablet by mouth 2 (Two) Times a Day.  Dispense: 180 tablet; Refill: 1    3. Chronic diastolic (congestive) heart failure  Assessment & Plan:  She is clinically not volume overloaded, pedal edema subsided.  Continue torsemide 20 mg along with potassium supplementation.  Will repeat basic metabolic panel before next visit.    Orders:  -     Comprehensive Metabolic Panel; Future    4. Mixed hyperlipidemia  Assessment & Plan:  Most recent LDL is 50, she was previously on pravastatin 20.  Currently on atorvastatin 80 mg, started by cardiothoracic surgeons.  We will decrease the dose to 40 mg nightly.  Repeat lipid panel before next visit.    Orders:  -     atorvastatin (LIPITOR) 40 MG tablet; Take 1 tablet by mouth Every Night.  Dispense: 90 tablet; Refill: 1  -     Comprehensive Metabolic Panel; Future  -     Lipid Panel; Future    5. Aneurysm of ascending aorta without rupture  Assessment & Plan:  S/p surgical repair, recovering well at this time.  She has follow-ups with cardiothoracic surgeons soon.                Follow Up     Return in about 4 months (around 5/17/2024) for Next scheduled follow up[facility] OK to double book .    Patient was given instructions and counseling regarding her condition or for health maintenance advice. Please see specific information pulled into the AVS if appropriate.

## 2024-01-17 NOTE — ASSESSMENT & PLAN NOTE
She is remaining sinus rhythm for the past 5 to 6 months.  She has not taken amiodarone for more than a week since she ran out of refills.  At this time, will discontinue amiodarone permanently to avoid long-term side effects.  Continue Eliquis for anticoagulation.  Will adjust dose of metoprolol to tartrate to 12.5 mg twice daily.

## 2024-01-17 NOTE — ASSESSMENT & PLAN NOTE
Most recent LDL is 50, she was previously on pravastatin 20.  Currently on atorvastatin 80 mg, started by cardiothoracic surgeons.  We will decrease the dose to 40 mg nightly.  Repeat lipid panel before next visit.

## 2024-01-25 ENCOUNTER — OFFICE VISIT (OUTPATIENT)
Dept: FAMILY MEDICINE CLINIC | Age: 59
End: 2024-01-25
Payer: COMMERCIAL

## 2024-01-25 VITALS
HEART RATE: 61 BPM | OXYGEN SATURATION: 97 % | BODY MASS INDEX: 24.58 KG/M2 | SYSTOLIC BLOOD PRESSURE: 135 MMHG | HEIGHT: 67 IN | WEIGHT: 156.6 LBS | DIASTOLIC BLOOD PRESSURE: 82 MMHG

## 2024-01-25 DIAGNOSIS — I49.5 SSS (SICK SINUS SYNDROME): ICD-10-CM

## 2024-01-25 DIAGNOSIS — E03.9 ACQUIRED HYPOTHYROIDISM: ICD-10-CM

## 2024-01-25 DIAGNOSIS — E78.2 MIXED HYPERLIPIDEMIA: ICD-10-CM

## 2024-01-25 DIAGNOSIS — Z00.00 ANNUAL PHYSICAL EXAM: Primary | ICD-10-CM

## 2024-01-25 DIAGNOSIS — N90.89 VULVAR LESION: ICD-10-CM

## 2024-01-25 DIAGNOSIS — I71.21 ANEURYSM OF ASCENDING AORTA WITHOUT RUPTURE: ICD-10-CM

## 2024-01-25 DIAGNOSIS — Z23 ENCOUNTER FOR IMMUNIZATION: ICD-10-CM

## 2024-01-25 DIAGNOSIS — I48.0 PAROXYSMAL ATRIAL FIBRILLATION: ICD-10-CM

## 2024-01-25 DIAGNOSIS — I10 ESSENTIAL HYPERTENSION: ICD-10-CM

## 2024-01-25 DIAGNOSIS — I50.32 CHRONIC DIASTOLIC (CONGESTIVE) HEART FAILURE: ICD-10-CM

## 2024-01-25 DIAGNOSIS — F33.1 MODERATE EPISODE OF RECURRENT MAJOR DEPRESSIVE DISORDER: ICD-10-CM

## 2024-01-25 PROBLEM — Z45.010 PACEMAKER AT END OF BATTERY LIFE: Status: RESOLVED | Noted: 2023-08-21 | Resolved: 2024-01-25

## 2024-01-25 NOTE — ASSESSMENT & PLAN NOTE
Stable on levothyroxine 112 mcg daily.  Refills were not needed today.  Labs were due today.  Continue to monitor.

## 2024-01-25 NOTE — ASSESSMENT & PLAN NOTE
Multiple vulvar cysts previously described on physical exam.  They continue to worry Lucina.  Referral placed to GYN for further evaluation and treatment as indicated.

## 2024-01-25 NOTE — ASSESSMENT & PLAN NOTE
She is UTD on colonoscopy, last done 12/2019 and this showed diverticulosis.  Ten year repeat recommended.  She is up-to-date on pap smear, last done 4/2021 and this was NIL with negative high-risk HPV.  Five-year repeat recommended.  She is UTD on mammogram, last done 10/2023 and this was normal.  She is UTD on flu (10/2023).  She is due for COVID, Shingrix and Tdap.  Tdap given today.  COVID declined.  Shingrix can be done at the pharmacy.  She is UTD on routine labs.

## 2024-01-25 NOTE — PROGRESS NOTES
"Chief Complaint  Annual Exam    Subjective          Lucina Payan presents to [facility] today for her annual physical.      She is UTD on colonoscopy, last done 12/2019 and this showed diverticulosis.  Ten year repeat recommended.  She is up-to-date on pap smear, last done 4/2021 and this was NIL with negative high-risk HPV.  Five-year repeat recommended.  She is UTD on mammogram, last done 10/2023 and this was normal.  She is UTD on flu (10/2023).  She is due for COVID, Shingrix and Tdap.  She is UTD on routine labs.     She has had a couple of falls.  \"I got wicked dizzy.\"  Occurs when she stands too quickly.  +Lightheaded.  She gets some warning but not much.    Status post ascending aortic aneurysm repair by Dr. Velasco 8/2/2023.  Diagnosed with atrial fibrillation at time time, s/p FLORENTINO-guided cardioversion.  She is now on metoprolol tartrate for rate control and Eliquis for anticoagulation.  She is following with Dr. Light and was last seen there on 1/17/2024.  He did discontinue the amiodarone at that time which she was previously taking.  Discontinuation of DOAC will be considered in the future.  She is on colchicine for chest pain.      She is on midodrine for hypotension, which was decreased at her visit last week.  She continues on metoprolol tartrate for rate control.  Blood pressure has been stable.  No chest pain, palpitations or shortness of breath.  He is following her as well for chronic diastolic heart failure.  No volume overload currently.  She continues torsemide daily with K+.    She is on atorvastatin for hyperlipidemia.  No myalgias.    She is on levothyroxine for hypothyroidism.  No heat/cold intolerance, no changes in skin or hair.      She is on sertraline for mood.  Things have been \"okay.\"  The holidays were hard  She is on trazodone for sleep.  This is working quite well for her.      Review of Systems   Constitutional:  Negative for chills, fatigue " and fever.   HENT:  Negative for congestion, hearing loss and rhinorrhea.    Eyes:  Negative for pain and visual disturbance.   Respiratory:  Negative for cough and shortness of breath.    Cardiovascular:  Negative for chest pain and palpitations.        +orthostasis   Gastrointestinal:  Negative for abdominal pain, constipation, diarrhea, nausea and vomiting.   Genitourinary:  Negative for dysuria and hematuria.   Musculoskeletal:  Negative for arthralgias and myalgias.   Skin:  Negative for rash.   Neurological:  Negative for weakness and numbness.   Psychiatric/Behavioral:  Positive for dysphoric mood. Negative for sleep disturbance. The patient is not nervous/anxious.          Current Outpatient Medications:     apixaban (ELIQUIS) 5 MG tablet tablet, Take 1 tablet by mouth 2 (Two) Times a Day., Disp: 180 tablet, Rfl: 1    Aspirin Low Dose 81 MG EC tablet, Take 1 tablet by mouth Daily., Disp: , Rfl:     atorvastatin (LIPITOR) 40 MG tablet, Take 1 tablet by mouth Every Night., Disp: 90 tablet, Rfl: 1    buPROPion XL (WELLBUTRIN XL) 300 MG 24 hr tablet, TAKE 1 TABLET BY MOUTH DAILY, Disp: 90 tablet, Rfl: 1    coenzyme Q10 100 MG capsule, Take 1 capsule by mouth Every Night., Disp: , Rfl:     colchicine 0.6 MG tablet, Take 1 tablet by mouth 2 (Two) Times a Day., Disp: 60 tablet, Rfl: 1    famotidine (PEPCID) 40 MG tablet, TAKE ONE TABLET BY MOUTH DAILY, Disp: 90 tablet, Rfl: 1    ferrous sulfate (FeroSul) 325 (65 FE) MG tablet, Every other day (Patient taking differently: Every other night), Disp: 90 tablet, Rfl: 1    levothyroxine (SYNTHROID, LEVOTHROID) 112 MCG tablet, Take 1 tablet by mouth Every Morning., Disp: 90 tablet, Rfl: 0    metoprolol tartrate (LOPRESSOR) 25 MG tablet, Take 0.5 tablets by mouth 2 (Two) Times a Day., Disp: 45 tablet, Rfl: 1    midodrine (PROAMATINE) 2.5 MG tablet, Take 1 tablet by mouth 2 (Two) Times a Day., Disp: 180 tablet, Rfl: 1    potassium chloride (K-TAB) 20 MEQ tablet  "controlled-release ER tablet, Take 1 tablet by mouth 2 (Two) Times a Day., Disp: 180 tablet, Rfl: 1    sertraline (ZOLOFT) 50 MG tablet, TAKE THREE TABLETS BY MOUTH DAILY, Disp: 90 tablet, Rfl: 1    torsemide (DEMADEX) 20 MG tablet, Take 1 tablet by mouth Daily., Disp: 90 tablet, Rfl: 3    traZODone (DESYREL) 100 MG tablet, TAKE ONE TABLET BY MOUTH EVERY NIGHT AT BEDTIME, Disp: 90 tablet, Rfl: 1    Allergies:  Demerol [meperidine], Morphine, Silver, and Lisinopril      Objective   Vital Signs:   Vitals:    01/25/24 1027   BP: 135/82   BP Location: Left arm   Patient Position: Sitting   Cuff Size: Adult   Pulse: 61   SpO2: 97%   Weight: 71 kg (156 lb 9.6 oz)   Height: 170.2 cm (67.01\")     Physical Exam  Vitals reviewed.   Constitutional:       General: She is not in acute distress.     Appearance: Normal appearance. She is well-developed.   HENT:      Head: Normocephalic and atraumatic.      Right Ear: External ear normal.      Left Ear: External ear normal.      Mouth/Throat:      Mouth: Mucous membranes are moist.   Eyes:      Extraocular Movements: Extraocular movements intact.      Conjunctiva/sclera: Conjunctivae normal.      Pupils: Pupils are equal, round, and reactive to light.   Cardiovascular:      Rate and Rhythm: Normal rate and regular rhythm.      Heart sounds: No murmur heard.  Pulmonary:      Effort: Pulmonary effort is normal.      Breath sounds: Normal breath sounds. No wheezing, rhonchi or rales.   Abdominal:      General: Bowel sounds are normal. There is no distension.      Palpations: Abdomen is soft.      Tenderness: There is no abdominal tenderness.   Musculoskeletal:         General: Normal range of motion.   Skin:     General: Skin is warm and dry.   Neurological:      Mental Status: She is alert and oriented to person, place, and time.      Deep Tendon Reflexes: Reflexes normal.   Psychiatric:         Mood and Affect: Mood and affect normal.         Behavior: Behavior normal.         " Thought Content: Thought content normal.         Judgment: Judgment normal.          Lab Results   Component Value Date    GLUCOSE 88 10/24/2023    BUN 17 10/24/2023    CREATININE 0.71 10/24/2023    EGFR 97.5 10/24/2023    BCR 23.9 10/24/2023    K 3.7 10/24/2023    CO2 28.5 10/24/2023    CALCIUM 9.5 10/24/2023    ALBUMIN 4.1 08/31/2023    BILITOT 0.5 08/31/2023     (H) 08/31/2023    ALT 78 (H) 08/31/2023       Lab Results   Component Value Date    CHOL 120 10/24/2023    CHLPL 161 03/18/2021    TRIG 48 10/24/2023    HDL 59 10/24/2023    LDL 50 10/24/2023       Lab Results   Component Value Date    WBC 6.45 10/24/2023    HGB 11.6 (L) 10/24/2023    HCT 37.6 10/24/2023    MCV 86.6 10/24/2023     10/24/2023            Assessment and Plan    Diagnoses and all orders for this visit:    1. Annual physical exam (Primary)  Assessment & Plan:  She is UTD on colonoscopy, last done 12/2019 and this showed diverticulosis.  Ten year repeat recommended.  She is up-to-date on pap smear, last done 4/2021 and this was NIL with negative high-risk HPV.  Five-year repeat recommended.  She is UTD on mammogram, last done 10/2023 and this was normal.  She is UTD on flu (10/2023).  She is due for COVID, Shingrix and Tdap.  Tdap given today.  COVID declined.  Shingrix can be done at the pharmacy.  She is UTD on routine labs.       2. Essential hypertension  Assessment & Plan:  It sounds like she is having some orthostatic hypotension despite the midodrine.  Move slowly between positional changes.  Check BP the next time she has one of these spells.  Encouraged her to monitor.      3. Mixed hyperlipidemia  Assessment & Plan:  Stable on atorvastatin 40 mg daily.  Refills were not needed today.  Labs were not due today.  Continue to monitor.        4. SSS (sick sinus syndrome)  Overview:  Following with Cardiology.  Status post pacemaker placement with battery change 10/20/2023.      5. Paroxysmal atrial  fibrillation  Overview:  Stable on metoprolol tartrate 12.5 mg twice daily for rate control and Eliquis 5 mg twice daily for anticoagulation.  Follow with Cardiology Dr. Lgiht.      6. Chronic diastolic (congestive) heart failure  Overview:  Stable on torsemide 20 mg daily and potassium 20 mEq twice daily.  Following with Dr. Light Cardiology.      7. Aneurysm of ascending aorta without rupture  Overview:  Ascending aortic aneurysm s/p surgical repair on 8/2/2023 with a 26 mm graft by Dr. Velasco      8. Moderate episode of recurrent major depressive disorder  Assessment & Plan:  Stable on sertraline 50 mg daily.  Refills were not needed today.  Continue to monitor.        9. Acquired hypothyroidism  Assessment & Plan:  Stable on levothyroxine 112 mcg daily.  Refills were not needed today.  Labs were due today.  Continue to monitor.      Orders:  -     TSH; Future    10. Vulvar lesion  Assessment & Plan:  Multiple vulvar cysts previously described on physical exam.  They continue to worry Lucina.  Referral placed to GYN for further evaluation and treatment as indicated.    Orders:  -     Ambulatory Referral to Obstetrics / Gynecology    11. Encounter for immunization  -     Tdap Vaccine Greater Than or Equal To 8yo IM        Follow Up   Return in about 3 months (around 4/25/2024) for Recheck.  Patient was given instructions and counseling regarding her condition or for health maintenance advice. Please see specific information pulled into the AVS if appropriate.         01/25/2024

## 2024-01-25 NOTE — ASSESSMENT & PLAN NOTE
It sounds like she is having some orthostatic hypotension despite the midodrine.  Move slowly between positional changes.  Check BP the next time she has one of these spells.  Encouraged her to monitor.

## 2024-02-06 ENCOUNTER — HOSPITAL ENCOUNTER (OUTPATIENT)
Dept: CT IMAGING | Facility: HOSPITAL | Age: 59
Discharge: HOME | End: 2024-02-06
Admitting: THORACIC SURGERY (CARDIOTHORACIC VASCULAR SURGERY)
Payer: COMMERCIAL

## 2024-02-06 DIAGNOSIS — I71.21 ANEURYSM OF THE ASCENDING AORTA, WITHOUT RUPTURE: ICD-10-CM

## 2024-02-06 PROCEDURE — 71250 CT THORAX DX C-: CPT

## 2024-02-07 DIAGNOSIS — F33.1 MODERATE EPISODE OF RECURRENT MAJOR DEPRESSIVE DISORDER: ICD-10-CM

## 2024-03-07 ENCOUNTER — TELEPHONE (OUTPATIENT)
Dept: CARDIOLOGY | Facility: CLINIC | Age: 59
End: 2024-03-07
Payer: COMMERCIAL

## 2024-03-07 NOTE — TELEPHONE ENCOUNTER
The [facility] received a fax that requires your attention. The document has been indexed to the patient’s chart for your review.      Reason for sending: EXTERNAL MEDICAL RECORD NOTIFICATION     Documents Description: PHYS ORD-[facility] REQ-3.7.24    Name of Sender: [facility]     Date Indexed: 3.7.24

## 2024-03-07 NOTE — TELEPHONE ENCOUNTER
Patient may proceed with upcoming surgical procedure at moderate risk for perioperative cardiac events.  She may hold Eliquis for 2 days prior to procedure.

## 2024-03-07 NOTE — TELEPHONE ENCOUNTER
Procedure: I & D Sebaceous Cyst    Medication Directive:  ELIQUIS (PCP)    PMH: PAF, HTN, HLD, CHF, Aortic aneurysm repair    Last Seen:  01/17/24    FLORENTINO: Nitin

## 2024-03-12 DIAGNOSIS — I50.32 CHRONIC DIASTOLIC (CONGESTIVE) HEART FAILURE: ICD-10-CM

## 2024-03-12 RX ORDER — LEVOTHYROXINE SODIUM 112 UG/1
112 TABLET ORAL EVERY MORNING
Qty: 90 TABLET | Refills: 0 | Status: SHIPPED | OUTPATIENT
Start: 2024-03-12

## 2024-03-12 RX ORDER — TORSEMIDE 20 MG/1
20 TABLET ORAL DAILY
Qty: 90 TABLET | Refills: 3 | Status: SHIPPED | OUTPATIENT
Start: 2024-03-12

## 2024-03-21 RX ORDER — COLCHICINE 0.6 MG/1
0.6 TABLET ORAL 2 TIMES DAILY
Qty: 60 TABLET | Refills: 1 | Status: SHIPPED | OUTPATIENT
Start: 2024-03-21

## 2024-04-16 DIAGNOSIS — F33.1 MODERATE EPISODE OF RECURRENT MAJOR DEPRESSIVE DISORDER: ICD-10-CM

## 2024-04-16 RX ORDER — TRAZODONE HYDROCHLORIDE 100 MG/1
TABLET ORAL
Qty: 90 TABLET | Refills: 1 | Status: SHIPPED | OUTPATIENT
Start: 2024-04-16

## 2024-04-16 RX ORDER — FAMOTIDINE 40 MG/1
40 TABLET, FILM COATED ORAL DAILY
Qty: 90 TABLET | Refills: 1 | Status: SHIPPED | OUTPATIENT
Start: 2024-04-16

## 2024-04-26 ENCOUNTER — LAB (OUTPATIENT)
Dept: LAB | Facility: HOSPITAL | Age: 59
End: 2024-04-26
Payer: COMMERCIAL

## 2024-04-26 ENCOUNTER — OFFICE VISIT (OUTPATIENT)
Dept: FAMILY MEDICINE CLINIC | Age: 59
End: 2024-04-26
Payer: COMMERCIAL

## 2024-04-26 VITALS
WEIGHT: 165.6 LBS | DIASTOLIC BLOOD PRESSURE: 73 MMHG | OXYGEN SATURATION: 95 % | HEART RATE: 61 BPM | TEMPERATURE: 98.2 F | BODY MASS INDEX: 25.99 KG/M2 | SYSTOLIC BLOOD PRESSURE: 118 MMHG | HEIGHT: 67 IN

## 2024-04-26 DIAGNOSIS — M19.042 PRIMARY OSTEOARTHRITIS OF HANDS, BILATERAL: ICD-10-CM

## 2024-04-26 DIAGNOSIS — E03.9 ACQUIRED HYPOTHYROIDISM: ICD-10-CM

## 2024-04-26 DIAGNOSIS — I48.0 PAROXYSMAL ATRIAL FIBRILLATION: ICD-10-CM

## 2024-04-26 DIAGNOSIS — E78.2 MIXED HYPERLIPIDEMIA: ICD-10-CM

## 2024-04-26 DIAGNOSIS — I71.21 ANEURYSM OF ASCENDING AORTA WITHOUT RUPTURE: ICD-10-CM

## 2024-04-26 DIAGNOSIS — M19.041 PRIMARY OSTEOARTHRITIS OF HANDS, BILATERAL: ICD-10-CM

## 2024-04-26 DIAGNOSIS — F33.1 MODERATE EPISODE OF RECURRENT MAJOR DEPRESSIVE DISORDER: Primary | ICD-10-CM

## 2024-04-26 DIAGNOSIS — I50.32 CHRONIC DIASTOLIC (CONGESTIVE) HEART FAILURE: ICD-10-CM

## 2024-04-26 DIAGNOSIS — I49.5 SSS (SICK SINUS SYNDROME): ICD-10-CM

## 2024-04-26 PROBLEM — Z00.00 ANNUAL PHYSICAL EXAM: Status: RESOLVED | Noted: 2022-07-22 | Resolved: 2024-04-26

## 2024-04-26 LAB
ALBUMIN SERPL-MCNC: 4.1 G/DL (ref 3.5–5.2)
ALBUMIN/GLOB SERPL: 1.9 G/DL
ALP SERPL-CCNC: 148 U/L (ref 39–117)
ALT SERPL W P-5'-P-CCNC: 23 U/L (ref 1–33)
ANION GAP SERPL CALCULATED.3IONS-SCNC: 9.3 MMOL/L (ref 5–15)
AST SERPL-CCNC: 33 U/L (ref 1–32)
BILIRUB SERPL-MCNC: 0.4 MG/DL (ref 0–1.2)
BUN SERPL-MCNC: 16 MG/DL (ref 8–23)
BUN/CREAT SERPL: 19.8 (ref 7–25)
CALCIUM SPEC-SCNC: 9.4 MG/DL (ref 8.6–10.5)
CHLORIDE SERPL-SCNC: 105 MMOL/L (ref 98–107)
CHOLEST SERPL-MCNC: 128 MG/DL (ref 0–200)
CO2 SERPL-SCNC: 28.7 MMOL/L (ref 22–29)
CREAT SERPL-MCNC: 0.81 MG/DL (ref 0.57–1)
DEPRECATED RDW RBC AUTO: 47.4 FL (ref 37–54)
EGFRCR SERPLBLD CKD-EPI 2021: 83.2 ML/MIN/1.73
ERYTHROCYTE [DISTWIDTH] IN BLOOD BY AUTOMATED COUNT: 13.2 % (ref 12.3–15.4)
GLOBULIN UR ELPH-MCNC: 2.2 GM/DL
GLUCOSE SERPL-MCNC: 96 MG/DL (ref 65–99)
HCT VFR BLD AUTO: 41.9 % (ref 34–46.6)
HDLC SERPL-MCNC: 55 MG/DL (ref 40–60)
HGB BLD-MCNC: 13.6 G/DL (ref 12–15.9)
LDLC SERPL CALC-MCNC: 60 MG/DL (ref 0–100)
LDLC/HDLC SERPL: 1.1 {RATIO}
MCH RBC QN AUTO: 31.3 PG (ref 26.6–33)
MCHC RBC AUTO-ENTMCNC: 32.5 G/DL (ref 31.5–35.7)
MCV RBC AUTO: 96.5 FL (ref 79–97)
PLATELET # BLD AUTO: 234 10*3/MM3 (ref 140–450)
PMV BLD AUTO: 10.9 FL (ref 6–12)
POTASSIUM SERPL-SCNC: 4 MMOL/L (ref 3.5–5.2)
PROT SERPL-MCNC: 6.3 G/DL (ref 6–8.5)
RBC # BLD AUTO: 4.34 10*6/MM3 (ref 3.77–5.28)
SODIUM SERPL-SCNC: 143 MMOL/L (ref 136–145)
TRIGL SERPL-MCNC: 63 MG/DL (ref 0–150)
TSH SERPL DL<=0.05 MIU/L-ACNC: 2.4 UIU/ML (ref 0.27–4.2)
VLDLC SERPL-MCNC: 13 MG/DL (ref 5–40)
WBC NRBC COR # BLD AUTO: 6.52 10*3/MM3 (ref 3.4–10.8)

## 2024-04-26 PROCEDURE — 80061 LIPID PANEL: CPT

## 2024-04-26 PROCEDURE — 99214 OFFICE O/P EST MOD 30 MIN: CPT | Performed by: FAMILY MEDICINE

## 2024-04-26 PROCEDURE — 36415 COLL VENOUS BLD VENIPUNCTURE: CPT

## 2024-04-26 PROCEDURE — 80050 GENERAL HEALTH PANEL: CPT

## 2024-04-26 RX ORDER — IBUPROFEN 600 MG/1
600 TABLET ORAL AS NEEDED
COMMUNITY

## 2024-04-26 RX ORDER — OXYCODONE HYDROCHLORIDE AND ACETAMINOPHEN 5; 325 MG/1; MG/1
1 TABLET ORAL AS NEEDED
COMMUNITY
Start: 2024-04-16

## 2024-04-26 RX ORDER — LEVOTHYROXINE SODIUM 112 UG/1
112 TABLET ORAL EVERY MORNING
Qty: 90 TABLET | Refills: 1 | Status: SHIPPED | OUTPATIENT
Start: 2024-04-26

## 2024-04-26 NOTE — PROGRESS NOTES
Chief Complaint  Hypertension (3 month follow up )    Subjective          Lucina Payan presents to [facility] today for routine follow-up.    She had multiple vulvar sebaceous cysts removed by Dr. Wilkinson last week.  Still having some weeping.      She is on metoprolol tartrate for rate control of atrial fibrillation.  S/p FLORENTINO-guided cardioversion back when she was diagnosed 8/2023.  Status post pacemaker placement for sick sinus syndrome.  She is no longer using Eliquis for anticoagulation due to cost.  Follows with Cardiology Dr. Light.  She has previously been on amiodarone.  She is on colchicine for chest pain.      She is on midodrine for hypotension, necessary to enable her to tolerate the metoprolol for rate control.  Her blood pressure has been stable.  Denies chest pain, palpitations or shortness of breath.  She does have chronic diastolic heart failure for which she is on torsemide daily with potassium supplementation.    S/p ascending aortic aneurysm repair by Dr. Velasco 8/2023.      She is on atorvastatin for HLD.  Denies myalgias.    She is on levothyroxine for hypothyroidism.  Denies heat/cold intolerance, changes in skin or hair.      She is on sertraline and Wellbutrin for depression.  She is pretty down today very tearful in the office.  Today is her  Hayder's birthday and Sunday will be the anniversary of his death, so this is an extremely difficult we can for her emotionally.  Prior to that, she tells me that she is not sure that the medications are really doing much for her anymore, although it is hard to tell at this time.  She is on trazodone for insomnia, and that at least is working well.      Current Outpatient Medications:     Aspirin Low Dose 81 MG EC tablet, Take 1 tablet by mouth Daily., Disp: , Rfl:     atorvastatin (LIPITOR) 40 MG tablet, Take 1 tablet by mouth Every Night., Disp: 90 tablet, Rfl: 1    buPROPion XL (WELLBUTRIN XL) 300 MG 24 hr  tablet, TAKE 1 TABLET BY MOUTH DAILY, Disp: 90 tablet, Rfl: 1    coenzyme Q10 100 MG capsule, Take 1 capsule by mouth Every Night., Disp: , Rfl:     colchicine 0.6 MG tablet, TAKE 1 TABLET BY MOUTH TWICE A DAY, Disp: 60 tablet, Rfl: 1    famotidine (PEPCID) 40 MG tablet, TAKE 1 TABLET BY MOUTH DAILY, Disp: 90 tablet, Rfl: 1    ferrous sulfate (FeroSul) 325 (65 FE) MG tablet, Every other day (Patient taking differently: Every other night), Disp: 90 tablet, Rfl: 1    levothyroxine (SYNTHROID, LEVOTHROID) 112 MCG tablet, Take 1 tablet by mouth Every Morning., Disp: 90 tablet, Rfl: 1    metoprolol tartrate (LOPRESSOR) 25 MG tablet, Take 0.5 tablets by mouth 2 (Two) Times a Day., Disp: 45 tablet, Rfl: 1    midodrine (PROAMATINE) 2.5 MG tablet, Take 1 tablet by mouth 2 (Two) Times a Day., Disp: 180 tablet, Rfl: 1    oxyCODONE-acetaminophen (PERCOCET) 5-325 MG per tablet, Take 1 tablet by mouth As Needed., Disp: , Rfl:     potassium chloride (K-TAB) 20 MEQ tablet controlled-release ER tablet, Take 1 tablet by mouth 2 (Two) Times a Day., Disp: 180 tablet, Rfl: 1    sertraline (ZOLOFT) 50 MG tablet, TAKE THREE TABLETS BY MOUTH DAILY, Disp: 270 tablet, Rfl: 1    torsemide (DEMADEX) 20 MG tablet, TAKE ONE TABLET BY MOUTH DAILY, Disp: 90 tablet, Rfl: 3    traZODone (DESYREL) 100 MG tablet, TAKE ONE TABLET BY MOUTH EVERY NIGHT AT BEDTIME, Disp: 90 tablet, Rfl: 1    Diclofenac Sodium (VOLTAREN) 1 % gel gel, Apply 4 g topically to the appropriate area as directed 4 (Four) Times a Day As Needed (arthritis) for up to 30 days., Disp: 100 g, Rfl: 2    ibuprofen (ADVIL,MOTRIN) 600 MG tablet, Take 1 tablet by mouth As Needed., Disp: , Rfl:   Medications Discontinued During This Encounter   Medication Reason    apixaban (ELIQUIS) 5 MG tablet tablet Cost of medication    levothyroxine (SYNTHROID, LEVOTHROID) 112 MCG tablet Reorder         Allergies:  Demerol [meperidine], Morphine, Silver, and Lisinopril      Objective   Vital Signs:  "  Vitals:    04/26/24 0856   BP: 118/73   BP Location: Right arm   Patient Position: Sitting   Cuff Size: Adult   Pulse: 61   Temp: 98.2 °F (36.8 °C)   TempSrc: Oral   SpO2: 95%   Weight: 75.1 kg (165 lb 9.6 oz)   Height: 170.2 cm (67.01\")     Body mass index is 25.93 kg/m².  BMI is >= 25 and <30. (Overweight) The following options were offered after discussion;: exercise counseling/recommendations and nutrition counseling/recommendations        Physical Exam  Constitutional:       Appearance: Normal appearance.   HENT:      Head: Normocephalic and atraumatic.   Eyes:      Extraocular Movements: Extraocular movements intact.      Conjunctiva/sclera: Conjunctivae normal.   Pulmonary:      Effort: Pulmonary effort is normal. No respiratory distress.   Musculoskeletal:         General: Normal range of motion.   Skin:     General: Skin is warm and dry.   Neurological:      General: No focal deficit present.      Mental Status: She is alert and oriented to person, place, and time.   Psychiatric:         Mood and Affect: Mood is depressed. Affect is tearful.         Behavior: Behavior normal.         Thought Content: Thought content normal.         Judgment: Judgment normal.           Lab Results   Component Value Date    GLUCOSE 88 10/24/2023    BUN 17 10/24/2023    CREATININE 0.71 10/24/2023    EGFRIFNONA 76 10/15/2021    BCR 23.9 10/24/2023    K 3.7 10/24/2023    CO2 28.5 10/24/2023    CALCIUM 9.5 10/24/2023    ALBUMIN 4.1 08/31/2023    LABIL2 1.4 03/18/2021     (H) 08/31/2023    ALT 78 (H) 08/31/2023       Lab Results   Component Value Date    CHOL 120 10/24/2023    CHLPL 161 03/18/2021    TRIG 48 10/24/2023    HDL 59 10/24/2023    LDL 50 10/24/2023       Lab Results   Component Value Date    WBC 6.52 04/26/2024    HGB 13.6 04/26/2024    HCT 41.9 04/26/2024    MCV 96.5 04/26/2024     04/26/2024            Assessment and Plan    Assessment & Plan  Moderate episode of recurrent major depressive disorder  I " will see her back in 3 to 4 weeks for close follow-up.  She just wants to get through this weekend first, but after that, we may look at making some changes with her medications.  It might be beneficial possibly to change her sertraline over to an SNRI.  The trazodone is working extremely well for sleep, however.  Primary osteoarthritis of hands, bilateral  Prescription sent for diclofenac gel to be used in the bilateral hands, which are bothering her.  Paroxysmal atrial fibrillation  Stable on metoprolol tartrate 12.5 mg twice daily for rate control.  She is no longer taking the Eliquis due to cost, as it was about $5 a month and she was unable to afford that.  I did give her a handout today with contact information for the [facility] so that she can hopefully get enrolled in [facility].  Follows with Cardiology Dr. Light.  Chronic diastolic (congestive) heart failure    Stable on torsemide 20 mg daily and potassium 20 mEq twice daily.  Following with Dr. Light Cardiology.  SSS (sick sinus syndrome)  Following with Cardiology. Status post pacemaker placement with battery change 10/2023.   Mixed hyperlipidemia   Stable on atorvastatin 40 mg daily.  Refills were not needed today.  Labs were not due today (ordered by Dr. Light and collected earlier today; will review when back).  Continue to monitor.   Acquired hypothyroidism  Stable on levothyroxine 112 mcg daily.  Refills were needed today.  Labs were not due today (already ordered by Dr. Light and collected earlier today; will review when back).  Continue to monitor.   Aneurysm of ascending aorta without rupture  Ascending aortic aneurysm s/p surgical repair on 8/2/2023 with a 26 mm graft by Dr. Velasco     New Medications Ordered This Visit   Medications    levothyroxine (SYNTHROID, LEVOTHROID) 112 MCG tablet     Sig: Take 1 tablet by mouth Every Morning.     Dispense:  90 tablet     Refill:  1    Diclofenac Sodium (VOLTAREN) 1 % gel gel     Sig:  Apply 4 g topically to the appropriate area as directed 4 (Four) Times a Day As Needed (arthritis) for up to 30 days.     Dispense:  100 g     Refill:  2           Follow Up   Return in about 4 weeks (around 5/24/2024) for Recheck.  Patient was given instructions and counseling regarding her condition or for health maintenance advice. Please see specific information pulled into the AVS if appropriate.           04/26/2024

## 2024-04-26 NOTE — ASSESSMENT & PLAN NOTE
Stable on atorvastatin 40 mg daily.  Refills were not needed today.  Labs were not due today (ordered by Dr. Light and collected earlier today; will review when back).  Continue to monitor.

## 2024-04-26 NOTE — ASSESSMENT & PLAN NOTE
Stable on levothyroxine 112 mcg daily.  Refills were needed today.  Labs were not due today (already ordered by Dr. Light and collected earlier today; will review when back).  Continue to monitor.

## 2024-04-26 NOTE — ASSESSMENT & PLAN NOTE
Stable on torsemide 20 mg daily and potassium 20 mEq twice daily.  Following with Dr. Light Cardiology.

## 2024-04-26 NOTE — ASSESSMENT & PLAN NOTE
I will see her back in 3 to 4 weeks for close follow-up.  She just wants to get through this weekend first, but after that, we may look at making some changes with her medications.  It might be beneficial possibly to change her sertraline over to an SNRI.  The trazodone is working extremely well for sleep, however.

## 2024-04-26 NOTE — ASSESSMENT & PLAN NOTE
Stable on metoprolol tartrate 12.5 mg twice daily for rate control.  She is no longer taking the Eliquis due to cost, as it was about $5 a month and she was unable to afford that.  I did give her a handout today with contact information for the [facility] clinic so that she can hopefully get enrolled in [facility].  Follows with Cardiology Dr. Light.

## 2024-05-01 RX ORDER — POTASSIUM CHLORIDE 1500 MG/1
20 TABLET, EXTENDED RELEASE ORAL 2 TIMES DAILY
Qty: 60 TABLET | Refills: 0 | Status: SHIPPED | OUTPATIENT
Start: 2024-05-01

## 2024-05-20 ENCOUNTER — OFFICE VISIT (OUTPATIENT)
Dept: CARDIOLOGY | Facility: CLINIC | Age: 59
End: 2024-05-20
Payer: COMMERCIAL

## 2024-05-20 ENCOUNTER — CLINICAL SUPPORT NO REQUIREMENTS (OUTPATIENT)
Dept: CARDIOLOGY | Facility: CLINIC | Age: 59
End: 2024-05-20
Payer: COMMERCIAL

## 2024-05-20 VITALS
HEIGHT: 67 IN | WEIGHT: 166 LBS | HEART RATE: 60 BPM | SYSTOLIC BLOOD PRESSURE: 131 MMHG | BODY MASS INDEX: 26.06 KG/M2 | DIASTOLIC BLOOD PRESSURE: 84 MMHG

## 2024-05-20 DIAGNOSIS — E78.2 MIXED HYPERLIPIDEMIA: ICD-10-CM

## 2024-05-20 DIAGNOSIS — Z95.0 PRESENCE OF CARDIAC PACEMAKER: ICD-10-CM

## 2024-05-20 DIAGNOSIS — I48.0 PAROXYSMAL ATRIAL FIBRILLATION: ICD-10-CM

## 2024-05-20 DIAGNOSIS — I71.21 ANEURYSM OF ASCENDING AORTA WITHOUT RUPTURE: ICD-10-CM

## 2024-05-20 DIAGNOSIS — I49.5 SSS (SICK SINUS SYNDROME): Primary | ICD-10-CM

## 2024-05-20 DIAGNOSIS — I50.32 CHRONIC DIASTOLIC (CONGESTIVE) HEART FAILURE: ICD-10-CM

## 2024-05-20 DIAGNOSIS — Z95.0 PRESENCE OF CARDIAC PACEMAKER: Primary | ICD-10-CM

## 2024-05-20 PROCEDURE — 99214 OFFICE O/P EST MOD 30 MIN: CPT | Performed by: INTERNAL MEDICINE

## 2024-05-20 RX ORDER — COLCHICINE 0.6 MG/1
0.6 TABLET ORAL DAILY
Start: 2024-05-20

## 2024-05-20 NOTE — PROGRESS NOTES
CARDIOLOGY FOLLOW-UP PROGRESS NOTE        Chief Complaint  Follow-up, Atrial Fibrillation, Congestive Heart Failure, and Pacemaker Check    Subjective            Lucina Payan presents to [facility] CARDIOLOGY  History of Present Illness    Ms Payan is here for a follow-up visit.  She denies any new complaints.  Currently feeling back to her baseline.  Denies any chest pain, shortness of breath, palpitations or dizziness no pedal edema.  She stopped taking Eliquis since it is cost prohibitive.  Amiodarone was discontinued during last office visit.      Past History:    1) Status post permanent pacemaker placement for bradycardia in ; 2) Ascending aortic aneurysm s/p surgical repair on 2023 with a 26 mm graft by Dr. Velasco. 3) Atrial fibrillation, following aneurysm surgery, status post FLORENTINO guided cardioversion.4) Essential hypertension 5) Mixed hyperlipidemia     Medical History:  Past Medical History:   Diagnosis Date    Allergic Seasonal    Arthritis     Hands    Asthma 23    Bradycardia     Pacemaker    Cardiac pacemaker     Medtronic    CHF (congestive heart failure) 2023    Cholelithiasis Removed     Coronary artery disease     Depression     GERD (gastroesophageal reflux disease)     Heart murmur     HL (hearing loss)     Hyperlipidemia     Hypertension     Hypothyroidism     Obesity 1974    Pancreatitis     Pneumonia 2023    Seizure     Medication reaction    SSS (sick sinus syndrome)     Thoracic aortic aneurysm     Urinary tract infection Several    Visual impairment Wear bifocals       Surgical History: has a past surgical history that includes Appendectomy; Cholecystectomy; tonsillectomy and adenoidectomy; postpartum tubal ligation;  section; Gastric bypass; Cardiac pacemaker placement; Tonsillectomy (); Colonoscopy (); Cardiac catheterization; Cardiac catheterization (N/A, 2023); Adenoidectomy (08180); Bariatric Surgery  (2003); Lymph node biopsy (2004); Tubal ligation (1995); Pacemaker Replacement (N/A, 10/24/2023); Coronary artery bypass graft (8/2/23); and Cardiac valve replacement (8/2/2023).     Family History: family history includes Alcohol abuse in her maternal aunt; Anxiety disorder in her sister; Arthritis in her father and mother; Bipolar disorder in her sister; Birth defects in her daughter; Cancer in her father, maternal grandmother, mother, and paternal grandfather; Dementia in her father; Depression in her mother and sister; Diabetes in her daughter; Heart disease in her mother; Hyperlipidemia in her father and mother; Hypertension in her father, mother, and sister; Kidney disease in her mother; Learning disabilities in her brother, daughter, and father; Liver cancer in her mother; Liver disease in her mother; Mental illness in her brother, mother, and sister; Miscarriages / Stillbirths in her daughter and sister; Schizophrenia in her sister; Stroke in her maternal grandfather and maternal grandmother; Suicide Attempts in her mother; Thyroid disease in her daughter, mother, and sister; Vision loss in her maternal grandmother.     Social History: reports that she has been smoking cigarettes. She started smoking about 3 years ago. She has a 3.1 pack-year smoking history. She has never been exposed to tobacco smoke. She has never used smokeless tobacco. She reports current alcohol use of about 1.0 standard drink of alcohol per week. She reports that she does not use drugs.    Allergies: Demerol [meperidine], Morphine, Silver, and Lisinopril    Current Outpatient Medications on File Prior to Visit   Medication Sig    Aspirin Low Dose 81 MG EC tablet Take 1 tablet by mouth Daily.    atorvastatin (LIPITOR) 40 MG tablet Take 1 tablet by mouth Every Night.    buPROPion XL (WELLBUTRIN XL) 300 MG 24 hr tablet TAKE 1 TABLET BY MOUTH DAILY    coenzyme Q10 100 MG capsule Take 1 capsule by mouth Every Night.    Diclofenac Sodium  "(VOLTAREN) 1 % gel gel Apply 4 g topically to the appropriate area as directed 4 (Four) Times a Day As Needed (arthritis) for up to 30 days.    famotidine (PEPCID) 40 MG tablet TAKE 1 TABLET BY MOUTH DAILY    ferrous sulfate (FeroSul) 325 (65 FE) MG tablet Every other day (Patient taking differently: Every other night)    ibuprofen (ADVIL,MOTRIN) 600 MG tablet Take 1 tablet by mouth As Needed.    levothyroxine (SYNTHROID, LEVOTHROID) 112 MCG tablet Take 1 tablet by mouth Every Morning.    metoprolol tartrate (LOPRESSOR) 25 MG tablet Take 0.5 tablets by mouth 2 (Two) Times a Day.    oxyCODONE-acetaminophen (PERCOCET) 5-325 MG per tablet Take 1 tablet by mouth As Needed.    potassium chloride ER (K-TAB) 20 MEQ tablet controlled-release ER tablet TAKE 1 TABLET BY MOUTH TWICE A DAY    sertraline (ZOLOFT) 50 MG tablet TAKE THREE TABLETS BY MOUTH DAILY    torsemide (DEMADEX) 20 MG tablet TAKE ONE TABLET BY MOUTH DAILY    traZODone (DESYREL) 100 MG tablet TAKE ONE TABLET BY MOUTH EVERY NIGHT AT BEDTIME    colchicine 0.6 MG tablet TAKE 1 TABLET BY MOUTH TWICE A DAY         Review of Systems   Respiratory:  Negative for cough, shortness of breath and wheezing.    Cardiovascular:  Negative for chest pain, palpitations and leg swelling.   Gastrointestinal:  Negative for nausea and vomiting.   Neurological:  Negative for dizziness and syncope.        Objective     /84   Pulse 60   Ht 170.2 cm (67\")   Wt 75.3 kg (166 lb)   BMI 26.00 kg/m²       Physical Exam    General : Alert, awake, no acute distress  Neck : Supple, no carotid bruit, no jugular venous distention  CVS : Regular rate and rhythm, no murmur, rubs or gallops  Lungs: Clear to auscultation bilaterally, no crackles or rhonchi  Abdomen: Soft, nontender, bowel sounds heard in all 4 quadrants  Extremities: Warm, well-perfused, no pedal edema    Result Review :     The following data was reviewed by: Yayo Light MD on 05/20/2024:    CMP          10/16/2023 "    14:12 10/24/2023    07:43 4/26/2024    08:36   CMP   Glucose 72  88  96    BUN 11  17  16    Creatinine 0.88  0.71  0.81    EGFR 75.8  97.5  83.2    Sodium 140  141  143    Potassium 4.3  3.7  4.0    Chloride 100  103  105    Calcium 9.6  9.5  9.4    Total Protein   6.3    Albumin   4.1    Globulin   2.2    Total Bilirubin   0.4    Alkaline Phosphatase   148    AST (SGOT)   33    ALT (SGPT)   23    Albumin/Globulin Ratio   1.9    BUN/Creatinine Ratio 12.5  23.9  19.8    Anion Gap 7.9  9.5  9.3      CBC          10/9/2023    15:36 10/24/2023    07:43 4/26/2024    08:36   CBC   WBC 5.43  6.45  6.52    RBC 4.00  4.34  4.34    Hemoglobin 10.8  11.6  13.6    Hematocrit 35.3  37.6  41.9    MCV 88.3  86.6  96.5    MCH 27.0  26.7  31.3    MCHC 30.6  30.9  32.5    RDW 16.0  17.8  13.2    Platelets 417  330  234      TSH          4/26/2024    08:36   TSH   TSH 2.400      Lipid Panel          10/24/2023    07:43 4/26/2024    08:36   Lipid Panel   Total Cholesterol 120  128    Triglycerides 48  63    HDL Cholesterol 59  55    VLDL Cholesterol 11  13    LDL Cholesterol  50  60    LDL/HDL Ratio 0.87  1.10           Data reviewed: Cardiology studies        Results for orders placed during the hospital encounter of 06/20/23    Adult Transthoracic Echo Complete W/ Cont if Necessary Per Protocol    Interpretation Summary    Left ventricular systolic function is normal. Left ventricular ejection fraction appears to be 61 - 65%.    Left ventricular diastolic function is consistent with (grade I) impaired relaxation.    There are no significant valvular abnormalities.    Aneurysmal dilation of the ascending aorta is present, measuring 4.7 cm diameter.    Estimated right ventricular systolic pressure from tricuspid regurgitation is normal (<35 mmHg).                   Assessment and Plan        Diagnoses and all orders for this visit:    1. Presence of cardiac pacemaker (Primary)  Assessment & Plan:  Pacemaker interrogated in the  office today.  Normally functioning device with a battery longevity of 14.3 years.  70% atrial pacing and 1% RV pacing.  Lead parameters are appropriate.  There were no episodes noted.  Will continue remote monitoring.      2. Mixed hyperlipidemia  Assessment & Plan:  Most recent LDL is 60, which is at goal.  Continue atorvastatin 40 mg nightly.      3. Paroxysmal atrial fibrillation  Assessment & Plan:  Patient is currently in normal sinus rhythm, ever since FLORENTINO guided cardioversion immediately after cardiac surgery.  She stopped taking Eliquis due to cost.  It is reasonable to continue without anticoagulation, since the A-fib episode was in setting of cardiac surgery.  She is already off amiodarone.  Will continue metoprolol.      4. Chronic diastolic (congestive) heart failure  Assessment & Plan:  She is clinically not volume overloaded.  Continue torsemide with potassium supplementation.      5. Aneurysm of ascending aorta without rupture  Assessment & Plan:  Currently asymptomatic.  Blood pressure is stable for a while.  Will discontinue midodrine.  She is also on colchicine which was started for pleuritic chest pain.  Will decrease colchicine to 0.6 mg daily for now.  Planning to stop during next follow-up visit if she remains asymptomatic.    Orders:  -     colchicine 0.6 MG tablet; Take 1 tablet by mouth Daily.              Follow Up     Return in about 6 months (around 11/20/2024) for Next scheduled follow up, In [place] office .    Patient was given instructions and counseling regarding her condition or for health maintenance advice. Please see specific information pulled into the AVS if appropriate.

## 2024-05-20 NOTE — ASSESSMENT & PLAN NOTE
Patient is currently in normal sinus rhythm, ever since FLORENTINO guided cardioversion immediately after cardiac surgery.  She stopped taking Eliquis due to cost.  It is reasonable to continue without anticoagulation, since the A-fib episode was in setting of cardiac surgery.  She is already off amiodarone.  Will continue metoprolol.

## 2024-05-20 NOTE — ASSESSMENT & PLAN NOTE
Currently asymptomatic.  Blood pressure is stable for a while.  Will discontinue midodrine.  She is also on colchicine which was started for pleuritic chest pain.  Will decrease colchicine to 0.6 mg daily for now.  Planning to stop during next follow-up visit if she remains asymptomatic.

## 2024-05-20 NOTE — ASSESSMENT & PLAN NOTE
Pacemaker interrogated in the office today.  Normally functioning device with a battery longevity of 14.3 years.  70% atrial pacing and 1% RV pacing.  Lead parameters are appropriate.  There were no episodes noted.  Will continue remote monitoring.

## 2024-06-10 RX ORDER — POTASSIUM CHLORIDE 1500 MG/1
20 TABLET, EXTENDED RELEASE ORAL 2 TIMES DAILY
Qty: 60 TABLET | Refills: 3 | Status: SHIPPED | OUTPATIENT
Start: 2024-06-10

## 2024-06-25 DIAGNOSIS — E61.1 IRON DEFICIENCY: ICD-10-CM

## 2024-06-26 RX ORDER — FERROUS SULFATE 325(65) MG
TABLET ORAL
Qty: 45 TABLET | Refills: 3 | Status: SHIPPED | OUTPATIENT
Start: 2024-06-26

## 2024-07-08 RX ORDER — BUPROPION HYDROCHLORIDE 300 MG/1
300 TABLET ORAL DAILY
Qty: 30 TABLET | Refills: 0 | Status: SHIPPED | OUTPATIENT
Start: 2024-07-08

## 2024-08-12 RX ORDER — BUPROPION HYDROCHLORIDE 300 MG/1
300 TABLET ORAL DAILY
Qty: 30 TABLET | Refills: 0 | Status: SHIPPED | OUTPATIENT
Start: 2024-08-12

## 2024-08-20 DIAGNOSIS — I10 ESSENTIAL HYPERTENSION: ICD-10-CM

## 2024-08-20 DIAGNOSIS — I48.0 PAROXYSMAL ATRIAL FIBRILLATION: ICD-10-CM

## 2024-08-26 DIAGNOSIS — E78.2 MIXED HYPERLIPIDEMIA: ICD-10-CM

## 2024-08-26 RX ORDER — ATORVASTATIN CALCIUM 40 MG/1
40 TABLET, FILM COATED ORAL NIGHTLY
Qty: 90 TABLET | Refills: 3 | Status: SHIPPED | OUTPATIENT
Start: 2024-08-26

## 2024-09-17 RX ORDER — BUPROPION HYDROCHLORIDE 300 MG/1
300 TABLET ORAL DAILY
Qty: 30 TABLET | Refills: 0 | Status: SHIPPED | OUTPATIENT
Start: 2024-09-17

## 2024-09-27 ENCOUNTER — LAB (OUTPATIENT)
Dept: LAB | Facility: HOSPITAL | Age: 59
End: 2024-09-27
Payer: COMMERCIAL

## 2024-09-27 ENCOUNTER — OFFICE VISIT (OUTPATIENT)
Dept: FAMILY MEDICINE CLINIC | Age: 59
End: 2024-09-27
Payer: COMMERCIAL

## 2024-09-27 VITALS
WEIGHT: 179.2 LBS | BODY MASS INDEX: 28.12 KG/M2 | HEIGHT: 67 IN | OXYGEN SATURATION: 98 % | DIASTOLIC BLOOD PRESSURE: 72 MMHG | SYSTOLIC BLOOD PRESSURE: 118 MMHG | HEART RATE: 63 BPM | TEMPERATURE: 98.3 F

## 2024-09-27 DIAGNOSIS — I50.32 CHRONIC DIASTOLIC (CONGESTIVE) HEART FAILURE: ICD-10-CM

## 2024-09-27 DIAGNOSIS — I48.0 PAROXYSMAL ATRIAL FIBRILLATION: ICD-10-CM

## 2024-09-27 DIAGNOSIS — E03.9 ACQUIRED HYPOTHYROIDISM: ICD-10-CM

## 2024-09-27 DIAGNOSIS — Z23 ENCOUNTER FOR IMMUNIZATION: ICD-10-CM

## 2024-09-27 DIAGNOSIS — E16.2 HYPOGLYCEMIA: ICD-10-CM

## 2024-09-27 DIAGNOSIS — I71.21 ANEURYSM OF ASCENDING AORTA WITHOUT RUPTURE: ICD-10-CM

## 2024-09-27 DIAGNOSIS — Z95.0 PRESENCE OF CARDIAC PACEMAKER: ICD-10-CM

## 2024-09-27 DIAGNOSIS — F33.1 MODERATE EPISODE OF RECURRENT MAJOR DEPRESSIVE DISORDER: Primary | ICD-10-CM

## 2024-09-27 DIAGNOSIS — K21.9 GASTROESOPHAGEAL REFLUX DISEASE WITHOUT ESOPHAGITIS: ICD-10-CM

## 2024-09-27 DIAGNOSIS — E78.2 MIXED HYPERLIPIDEMIA: ICD-10-CM

## 2024-09-27 DIAGNOSIS — I49.5 SSS (SICK SINUS SYNDROME): ICD-10-CM

## 2024-09-27 PROBLEM — N90.89 VULVAR LESION: Status: RESOLVED | Noted: 2024-01-25 | Resolved: 2024-09-27

## 2024-09-27 LAB
ALBUMIN SERPL-MCNC: 4.3 G/DL (ref 3.5–5.2)
ALBUMIN/GLOB SERPL: 1.8 G/DL
ALP SERPL-CCNC: 129 U/L (ref 39–117)
ALT SERPL W P-5'-P-CCNC: 20 U/L (ref 1–33)
ANION GAP SERPL CALCULATED.3IONS-SCNC: 8.1 MMOL/L (ref 5–15)
AST SERPL-CCNC: 30 U/L (ref 1–32)
BASOPHILS # BLD AUTO: 0.07 10*3/MM3 (ref 0–0.2)
BASOPHILS NFR BLD AUTO: 1 % (ref 0–1.5)
BILIRUB SERPL-MCNC: 0.3 MG/DL (ref 0–1.2)
BUN SERPL-MCNC: 13 MG/DL (ref 8–23)
BUN/CREAT SERPL: 16.7 (ref 7–25)
CALCIUM SPEC-SCNC: 9.4 MG/DL (ref 8.6–10.5)
CHLORIDE SERPL-SCNC: 102 MMOL/L (ref 98–107)
CO2 SERPL-SCNC: 29.9 MMOL/L (ref 22–29)
CREAT SERPL-MCNC: 0.78 MG/DL (ref 0.57–1)
DEPRECATED RDW RBC AUTO: 44.2 FL (ref 37–54)
EGFRCR SERPLBLD CKD-EPI 2021: 87.1 ML/MIN/1.73
EOSINOPHIL # BLD AUTO: 0.05 10*3/MM3 (ref 0–0.4)
EOSINOPHIL NFR BLD AUTO: 0.7 % (ref 0.3–6.2)
ERYTHROCYTE [DISTWIDTH] IN BLOOD BY AUTOMATED COUNT: 12.4 % (ref 12.3–15.4)
GLOBULIN UR ELPH-MCNC: 2.4 GM/DL
GLUCOSE SERPL-MCNC: 99 MG/DL (ref 65–99)
HBA1C MFR BLD: 5.4 % (ref 4.8–5.6)
HCT VFR BLD AUTO: 44.1 % (ref 34–46.6)
HGB BLD-MCNC: 14.6 G/DL (ref 12–15.9)
IMM GRANULOCYTES # BLD AUTO: 0.01 10*3/MM3 (ref 0–0.05)
IMM GRANULOCYTES NFR BLD AUTO: 0.1 % (ref 0–0.5)
LYMPHOCYTES # BLD AUTO: 1.64 10*3/MM3 (ref 0.7–3.1)
LYMPHOCYTES NFR BLD AUTO: 24.3 % (ref 19.6–45.3)
MCH RBC QN AUTO: 31.8 PG (ref 26.6–33)
MCHC RBC AUTO-ENTMCNC: 33.1 G/DL (ref 31.5–35.7)
MCV RBC AUTO: 96.1 FL (ref 79–97)
MONOCYTES # BLD AUTO: 0.52 10*3/MM3 (ref 0.1–0.9)
MONOCYTES NFR BLD AUTO: 7.7 % (ref 5–12)
NEUTROPHILS NFR BLD AUTO: 4.45 10*3/MM3 (ref 1.7–7)
NEUTROPHILS NFR BLD AUTO: 66.2 % (ref 42.7–76)
PLATELET # BLD AUTO: 222 10*3/MM3 (ref 140–450)
PMV BLD AUTO: 10.2 FL (ref 6–12)
POTASSIUM SERPL-SCNC: 3.7 MMOL/L (ref 3.5–5.2)
PROT SERPL-MCNC: 6.7 G/DL (ref 6–8.5)
RBC # BLD AUTO: 4.59 10*6/MM3 (ref 3.77–5.28)
SODIUM SERPL-SCNC: 140 MMOL/L (ref 136–145)
TSH SERPL DL<=0.05 MIU/L-ACNC: 1.76 UIU/ML (ref 0.27–4.2)
WBC NRBC COR # BLD AUTO: 6.74 10*3/MM3 (ref 3.4–10.8)

## 2024-09-27 PROCEDURE — 36415 COLL VENOUS BLD VENIPUNCTURE: CPT

## 2024-09-27 PROCEDURE — 80050 GENERAL HEALTH PANEL: CPT

## 2024-09-27 PROCEDURE — 90656 IIV3 VACC NO PRSV 0.5 ML IM: CPT | Performed by: FAMILY MEDICINE

## 2024-09-27 PROCEDURE — 99214 OFFICE O/P EST MOD 30 MIN: CPT | Performed by: FAMILY MEDICINE

## 2024-09-27 PROCEDURE — 83036 HEMOGLOBIN GLYCOSYLATED A1C: CPT

## 2024-09-27 PROCEDURE — 90471 IMMUNIZATION ADMIN: CPT | Performed by: FAMILY MEDICINE

## 2024-09-27 RX ORDER — FAMOTIDINE 40 MG/1
40 TABLET, FILM COATED ORAL DAILY
Qty: 90 TABLET | Refills: 1 | Status: SHIPPED | OUTPATIENT
Start: 2024-09-27

## 2024-09-27 RX ORDER — POTASSIUM CHLORIDE 1500 MG/1
20 TABLET, EXTENDED RELEASE ORAL
COMMUNITY
End: 2024-09-27

## 2024-09-27 RX ORDER — BUPROPION HYDROCHLORIDE 300 MG/1
300 TABLET ORAL DAILY
Qty: 90 TABLET | Refills: 1 | Status: SHIPPED | OUTPATIENT
Start: 2024-09-27

## 2024-10-02 RX ORDER — ASPIRIN 81 MG/1
81 TABLET, COATED ORAL DAILY
Qty: 90 TABLET | Refills: 3 | Status: SHIPPED | OUTPATIENT
Start: 2024-10-02

## 2024-10-07 RX ORDER — POTASSIUM CHLORIDE 1500 MG/1
20 TABLET, EXTENDED RELEASE ORAL 2 TIMES DAILY
Qty: 60 TABLET | Refills: 3 | Status: SHIPPED | OUTPATIENT
Start: 2024-10-07

## 2024-10-16 DIAGNOSIS — F33.1 MODERATE EPISODE OF RECURRENT MAJOR DEPRESSIVE DISORDER: ICD-10-CM

## 2024-10-16 RX ORDER — TRAZODONE HYDROCHLORIDE 100 MG/1
100 TABLET ORAL
Qty: 90 TABLET | Refills: 1 | Status: SHIPPED | OUTPATIENT
Start: 2024-10-16

## 2024-10-19 DIAGNOSIS — F33.1 MODERATE EPISODE OF RECURRENT MAJOR DEPRESSIVE DISORDER: ICD-10-CM

## 2024-10-21 RX ORDER — BUPROPION HYDROCHLORIDE 300 MG/1
300 TABLET ORAL DAILY
Qty: 30 TABLET | Refills: 0 | OUTPATIENT
Start: 2024-10-21

## 2024-10-24 ENCOUNTER — TELEPHONE (OUTPATIENT)
Dept: FAMILY MEDICINE CLINIC | Age: 59
End: 2024-10-24
Payer: COMMERCIAL

## 2024-10-24 DIAGNOSIS — Z12.31 ENCOUNTER FOR SCREENING MAMMOGRAM FOR BREAST CANCER: Primary | ICD-10-CM

## 2024-10-24 NOTE — TELEPHONE ENCOUNTER
----- Message from Brooke Clark sent at 10/24/2023 10:26 AM EDT -----  Regarding: f/u screening mammo  Please call pt to let her know that she is due for her yearly screening mammogram.  Please pend order for screening mammo and send to me.  Thanks, MARSHAL

## 2024-11-20 ENCOUNTER — OFFICE VISIT (OUTPATIENT)
Dept: CARDIOLOGY | Facility: CLINIC | Age: 59
End: 2024-11-20
Payer: COMMERCIAL

## 2024-11-20 VITALS
HEART RATE: 62 BPM | DIASTOLIC BLOOD PRESSURE: 70 MMHG | HEIGHT: 67 IN | WEIGHT: 184 LBS | BODY MASS INDEX: 28.88 KG/M2 | SYSTOLIC BLOOD PRESSURE: 127 MMHG

## 2024-11-20 DIAGNOSIS — I50.32 CHRONIC DIASTOLIC (CONGESTIVE) HEART FAILURE: ICD-10-CM

## 2024-11-20 DIAGNOSIS — I71.21 ANEURYSM OF ASCENDING AORTA WITHOUT RUPTURE: Primary | ICD-10-CM

## 2024-11-20 DIAGNOSIS — E78.2 MIXED HYPERLIPIDEMIA: ICD-10-CM

## 2024-11-20 DIAGNOSIS — I48.0 PAROXYSMAL ATRIAL FIBRILLATION: ICD-10-CM

## 2024-11-20 DIAGNOSIS — Z95.0 PRESENCE OF CARDIAC PACEMAKER: ICD-10-CM

## 2024-11-20 PROCEDURE — 99214 OFFICE O/P EST MOD 30 MIN: CPT | Performed by: INTERNAL MEDICINE

## 2024-11-20 RX ORDER — METOPROLOL SUCCINATE 25 MG/1
25 TABLET, EXTENDED RELEASE ORAL DAILY
Qty: 90 TABLET | Refills: 3 | Status: SHIPPED | OUTPATIENT
Start: 2024-11-20

## 2024-11-20 NOTE — ASSESSMENT & PLAN NOTE
Home remote interrogation done recently showed normally functioning device with battery longevity 14 years.  Lead parameters appropriate.  Will do the interrogation in the office during next visit.

## 2024-11-20 NOTE — ASSESSMENT & PLAN NOTE
She is currently stable with no symptoms.  Already off colchicine, which was started due to pleuritic chest pain.

## 2024-11-20 NOTE — PROGRESS NOTES
CARDIOLOGY FOLLOW-UP PROGRESS NOTE        Chief Complaint  Follow-up, Atrial Fibrillation, and Congestive Heart Failure    Subjective            Lucina Payan presents to [facility] CARDIOLOGY  History of Present Illness    Ms Payan is here for a routine 6-month follow-up visit.  She denies any recent episodes of palpitations.  Denies chest pain, shortness of breath or dizziness.  She went back to full-time job recently.  She gained around 18 pounds from the past 6 months.  She is less active at this time.      Past History:    1) Status post permanent pacemaker placement for bradycardia in 2013; 2) Ascending aortic aneurysm s/p surgical repair on 8/2/2023 with a 26 mm graft by Dr. Velasco. 3) Atrial fibrillation, following aneurysm surgery, status post FLORENTINO guided cardioversion.4) Essential hypertension 5) Mixed hyperlipidemia     Medical History:  Past Medical History:   Diagnosis Date    Allergic Seasonal    Arthritis 2021    Hands    Asthma 8/28/23    Bradycardia     Pacemaker    Cardiac pacemaker     Medtronic    CHF (congestive heart failure) 9/2023    Cholelithiasis Removed 2003    Coronary artery disease     Depression     GERD (gastroesophageal reflux disease)     Heart murmur 1983    HL (hearing loss) 2021    Hyperlipidemia     Hypertension     Hypothyroidism     Obesity 1974    Pancreatitis 2003    Pneumonia 9/2023    Seizure     Medication reaction    SSS (sick sinus syndrome)     Thoracic aortic aneurysm     Urinary tract infection Several    Visual impairment Wear bifocals       Family History: family history includes Alcohol abuse in her maternal aunt; Anxiety disorder in her sister; Arthritis in her father and mother; Bipolar disorder in her sister; Birth defects in her daughter; Cancer in her father, maternal grandmother, mother, and paternal grandfather; Dementia in her father; Depression in her mother and sister; Diabetes in her daughter; Heart disease in her mother;  Hyperlipidemia in her father and mother; Hypertension in her father, mother, and sister; Kidney disease in her mother; Learning disabilities in her brother, daughter, and father; Liver cancer in her mother; Liver disease in her mother; Mental illness in her brother, mother, and sister; Miscarriages / Stillbirths in her daughter and sister; Schizophrenia in her sister; Stroke in her maternal grandfather and maternal grandmother; Suicide Attempts in her mother; Thyroid disease in her daughter, mother, and sister; Vision loss in her maternal grandmother.     Social History: reports that she has been smoking cigarettes. She started smoking about 3 years ago. She has a 3.6 pack-year smoking history. She has never been exposed to tobacco smoke. She has never used smokeless tobacco. She reports current alcohol use of about 1.0 standard drink of alcohol per week. She reports that she does not use drugs.    Allergies: Demerol [meperidine], Morphine, Silver, and Lisinopril    Current Outpatient Medications on File Prior to Visit   Medication Sig    traZODone (DESYREL) 100 MG tablet TAKE 1 TABLET BY MOUTH EVERY NIGHT AT BEDTIME    Aspirin Low Dose 81 MG EC tablet Take 1 tablet by mouth Daily.    atorvastatin (LIPITOR) 40 MG tablet TAKE ONE TABLET BY MOUTH ONCE NIGHTLY    buPROPion XL (WELLBUTRIN XL) 300 MG 24 hr tablet Take 1 tablet by mouth Daily.    coenzyme Q10 100 MG capsule Take 1 capsule by mouth Every Night.    famotidine (PEPCID) 40 MG tablet Take 1 tablet by mouth Daily.    ferrous sulfate (FeroSul) 325 (65 FE) MG tablet Every other night    ibuprofen (ADVIL,MOTRIN) 600 MG tablet Take 1 tablet by mouth As Needed.    levothyroxine (SYNTHROID, LEVOTHROID) 112 MCG tablet Take 1 tablet by mouth Every Morning.    oxyCODONE-acetaminophen (PERCOCET) 5-325 MG per tablet Take 1 tablet by mouth As Needed.    potassium chloride ER (K-TAB) 20 MEQ tablet controlled-release ER tablet TAKE 1 TABLET BY MOUTH TWICE A DAY    sertraline  "(ZOLOFT) 50 MG tablet Take 3 tablets by mouth Daily.    torsemide (DEMADEX) 20 MG tablet TAKE ONE TABLET BY MOUTH DAILY    [DISCONTINUED] metoprolol tartrate (LOPRESSOR) 25 MG tablet TAKE 0.5 TABLET BY MOUTH TWO TIMES A DAY     No current facility-administered medications on file prior to visit.          Review of Systems   Respiratory:  Negative for cough, shortness of breath and wheezing.    Cardiovascular:  Negative for chest pain, palpitations and leg swelling.   Gastrointestinal:  Negative for nausea and vomiting.   Neurological:  Negative for dizziness and syncope.        Objective     /70   Pulse 62   Ht 170.2 cm (67\")   Wt 83.5 kg (184 lb)   BMI 28.82 kg/m²       Physical Exam    General : Alert, awake, no acute distress  Neck : Supple, no carotid bruit, no jugular venous distention  CVS : Regular rate and rhythm, no murmur, rubs or gallops  Lungs: Clear to auscultation bilaterally, no crackles or rhonchi  Abdomen: Soft, nontender, bowel sounds heard in all 4 quadrants  Extremities: Warm, well-perfused, no pedal edema    Result Review :     The following data was reviewed by: Yayo Light MD on 11/20/2024:    CMP          4/26/2024    08:36 9/27/2024    10:49   CMP   Glucose 96  99    BUN 16  13    Creatinine 0.81  0.78    EGFR 83.2  87.1    Sodium 143  140    Potassium 4.0  3.7    Chloride 105  102    Calcium 9.4  9.4    Total Protein 6.3  6.7    Albumin 4.1  4.3    Globulin 2.2  2.4    Total Bilirubin 0.4  0.3    Alkaline Phosphatase 148  129    AST (SGOT) 33  30    ALT (SGPT) 23  20    Albumin/Globulin Ratio 1.9  1.8    BUN/Creatinine Ratio 19.8  16.7    Anion Gap 9.3  8.1      CBC          4/26/2024    08:36 9/27/2024    10:49   CBC   WBC 6.52  6.74    RBC 4.34  4.59    Hemoglobin 13.6  14.6    Hematocrit 41.9  44.1    MCV 96.5  96.1    MCH 31.3  31.8    MCHC 32.5  33.1    RDW 13.2  12.4    Platelets 234  222      TSH          4/26/2024    08:36 9/27/2024    10:49   TSH   TSH 2.400  1.760  "     Lipid Panel          4/26/2024    08:36   Lipid Panel   Total Cholesterol 128    Triglycerides 63    HDL Cholesterol 55    VLDL Cholesterol 13    LDL Cholesterol  60    LDL/HDL Ratio 1.10           Data reviewed: Cardiology studies        Results for orders placed during the hospital encounter of 06/20/23    Adult Transthoracic Echo Complete W/ Cont if Necessary Per Protocol    Interpretation Summary    Left ventricular systolic function is normal. Left ventricular ejection fraction appears to be 61 - 65%.    Left ventricular diastolic function is consistent with (grade I) impaired relaxation.    There are no significant valvular abnormalities.    Aneurysmal dilation of the ascending aorta is present, measuring 4.7 cm diameter.    Estimated right ventricular systolic pressure from tricuspid regurgitation is normal (<35 mmHg).                   Assessment and Plan        Diagnoses and all orders for this visit:    1. Aneurysm of ascending aorta without rupture (Primary)  Assessment & Plan:  She is currently stable with no symptoms.  Already off colchicine, which was started due to pleuritic chest pain.      2. Chronic diastolic (congestive) heart failure  Assessment & Plan:  She is clinically not volume overloaded.  NYHA class I symptoms.  Recent labs showed normal creatinine and potassium levels.  Will continue torsemide with potassium at the current dose.    Orders:  -     metoprolol succinate XL (TOPROL-XL) 25 MG 24 hr tablet; Take 1 tablet by mouth Daily.  Dispense: 90 tablet; Refill: 3    3. Paroxysmal atrial fibrillation  Assessment & Plan:  Detected in the postoperative period following surgery for aneurysm, she underwent FLORENTINO guided cardioversion.  Amiodarone was discontinued during last office visit.  She has no palpitations, no A-fib burden on pacemaker interrogation.  Self discontinued Eliquis due to cost.  We will continue metoprolol, but change formulation to metoprolol succinate 25 mg once daily.   Continue aspirin    Orders:  -     metoprolol succinate XL (TOPROL-XL) 25 MG 24 hr tablet; Take 1 tablet by mouth Daily.  Dispense: 90 tablet; Refill: 3    4. Presence of cardiac pacemaker  Assessment & Plan:  Home remote interrogation done recently showed normally functioning device with battery longevity 14 years.  Lead parameters appropriate.  Will do the interrogation in the office during next visit.      5. Mixed hyperlipidemia  Assessment & Plan:  Most recent LDL is 60, which is at goal.  Continue atorvastatin 40 mg nightly.                Follow Up     Return in about 6 months (around 5/20/2025) for Next scheduled follow up, Device check.    Patient was given instructions and counseling regarding her condition or for health maintenance advice. Please see specific information pulled into the AVS if appropriate.

## 2024-11-20 NOTE — ASSESSMENT & PLAN NOTE
She is clinically not volume overloaded.  NYHA class I symptoms.  Recent labs showed normal creatinine and potassium levels.  Will continue torsemide with potassium at the current dose.

## 2024-11-20 NOTE — ASSESSMENT & PLAN NOTE
Detected in the postoperative period following surgery for aneurysm, she underwent FLORENTINO guided cardioversion.  Amiodarone was discontinued during last office visit.  She has no palpitations, no A-fib burden on pacemaker interrogation.  Self discontinued Eliquis due to cost.  We will continue metoprolol, but change formulation to metoprolol succinate 25 mg once daily.  Continue aspirin

## 2025-01-21 DIAGNOSIS — K21.9 GASTROESOPHAGEAL REFLUX DISEASE WITHOUT ESOPHAGITIS: ICD-10-CM

## 2025-01-21 DIAGNOSIS — F33.1 MODERATE EPISODE OF RECURRENT MAJOR DEPRESSIVE DISORDER: ICD-10-CM

## 2025-01-22 RX ORDER — FAMOTIDINE 40 MG/1
40 TABLET, FILM COATED ORAL DAILY
Qty: 90 TABLET | Refills: 1 | OUTPATIENT
Start: 2025-01-22

## 2025-01-24 ENCOUNTER — TELEPHONE (OUTPATIENT)
Dept: CARDIOLOGY | Facility: CLINIC | Age: 60
End: 2025-01-24

## 2025-01-24 DIAGNOSIS — E03.9 ACQUIRED HYPOTHYROIDISM: ICD-10-CM

## 2025-01-24 DIAGNOSIS — K21.9 GASTROESOPHAGEAL REFLUX DISEASE WITHOUT ESOPHAGITIS: ICD-10-CM

## 2025-01-24 DIAGNOSIS — F33.1 MODERATE EPISODE OF RECURRENT MAJOR DEPRESSIVE DISORDER: ICD-10-CM

## 2025-01-24 RX ORDER — FAMOTIDINE 40 MG/1
40 TABLET, FILM COATED ORAL DAILY
Qty: 90 TABLET | Refills: 1 | Status: SHIPPED | OUTPATIENT
Start: 2025-01-24

## 2025-01-24 RX ORDER — BUPROPION HYDROCHLORIDE 300 MG/1
300 TABLET ORAL DAILY
Qty: 90 TABLET | Refills: 1 | Status: SHIPPED | OUTPATIENT
Start: 2025-01-24

## 2025-01-24 RX ORDER — LEVOTHYROXINE SODIUM 112 UG/1
112 TABLET ORAL EVERY MORNING
Qty: 90 TABLET | Refills: 1 | Status: SHIPPED | OUTPATIENT
Start: 2025-01-24

## 2025-01-24 RX ORDER — TRAZODONE HYDROCHLORIDE 100 MG/1
100 TABLET ORAL
Qty: 90 TABLET | Refills: 1 | Status: SHIPPED | OUTPATIENT
Start: 2025-01-24

## 2025-01-24 NOTE — TELEPHONE ENCOUNTER
[facility] received a fax that requires your attention. The document has been indexed to the patient’s chart for your review.      Reason for sending: EXTERNAL MEDICAL RECORD NOTIFICATION     Documents Description: METOPROLOL RX REQ-[facility]-1.23.25    Name of Sender: [facility]     Date Indexed: 1.23.25

## 2025-01-31 ENCOUNTER — OFFICE VISIT (OUTPATIENT)
Dept: FAMILY MEDICINE CLINIC | Age: 60
End: 2025-01-31
Payer: COMMERCIAL

## 2025-01-31 VITALS
HEIGHT: 67 IN | BODY MASS INDEX: 29.66 KG/M2 | OXYGEN SATURATION: 96 % | SYSTOLIC BLOOD PRESSURE: 115 MMHG | DIASTOLIC BLOOD PRESSURE: 57 MMHG | TEMPERATURE: 98.2 F | HEART RATE: 60 BPM | WEIGHT: 189 LBS

## 2025-01-31 DIAGNOSIS — I49.5 SSS (SICK SINUS SYNDROME): ICD-10-CM

## 2025-01-31 DIAGNOSIS — F33.1 MODERATE EPISODE OF RECURRENT MAJOR DEPRESSIVE DISORDER: ICD-10-CM

## 2025-01-31 DIAGNOSIS — Z12.31 SCREENING MAMMOGRAM FOR BREAST CANCER: ICD-10-CM

## 2025-01-31 DIAGNOSIS — I50.32 CHRONIC DIASTOLIC (CONGESTIVE) HEART FAILURE: ICD-10-CM

## 2025-01-31 DIAGNOSIS — I48.0 PAROXYSMAL ATRIAL FIBRILLATION: ICD-10-CM

## 2025-01-31 DIAGNOSIS — Z95.0 PRESENCE OF CARDIAC PACEMAKER: ICD-10-CM

## 2025-01-31 DIAGNOSIS — Z82.49 FAMILY HISTORY OF ANEURYSM OF BLOOD VESSEL OF BRAIN: ICD-10-CM

## 2025-01-31 DIAGNOSIS — E03.9 ACQUIRED HYPOTHYROIDISM: ICD-10-CM

## 2025-01-31 DIAGNOSIS — E78.2 MIXED HYPERLIPIDEMIA: ICD-10-CM

## 2025-01-31 DIAGNOSIS — Z00.00 ANNUAL PHYSICAL EXAM: Primary | ICD-10-CM

## 2025-01-31 DIAGNOSIS — I71.21 ANEURYSM OF ASCENDING AORTA WITHOUT RUPTURE: ICD-10-CM

## 2025-01-31 PROBLEM — E16.2 HYPOGLYCEMIA: Status: RESOLVED | Noted: 2024-09-27 | Resolved: 2025-01-31

## 2025-01-31 PROBLEM — Z98.890 S/P ASCENDING AORTIC ANEURYSM REPAIR: Status: RESOLVED | Noted: 2023-08-18 | Resolved: 2025-01-31

## 2025-01-31 PROBLEM — R73.9 HYPERGLYCEMIA: Status: RESOLVED | Noted: 2023-01-13 | Resolved: 2025-01-31

## 2025-01-31 PROBLEM — F51.01 PRIMARY INSOMNIA: Status: RESOLVED | Noted: 2021-10-15 | Resolved: 2025-01-31

## 2025-01-31 PROBLEM — Z86.79 S/P ASCENDING AORTIC ANEURYSM REPAIR: Status: RESOLVED | Noted: 2023-08-18 | Resolved: 2025-01-31

## 2025-01-31 PROCEDURE — 99396 PREV VISIT EST AGE 40-64: CPT | Performed by: FAMILY MEDICINE

## 2025-01-31 RX ORDER — METOPROLOL SUCCINATE 25 MG/1
25 TABLET, EXTENDED RELEASE ORAL DAILY
Qty: 90 TABLET | Refills: 3 | Status: SHIPPED | OUTPATIENT
Start: 2025-01-31

## 2025-01-31 NOTE — ASSESSMENT & PLAN NOTE
Stable on levothyroxine 112 mcg daily.  Refills were not needed today.  Labs were not due today.  Continue to monitor.

## 2025-01-31 NOTE — TELEPHONE ENCOUNTER
The [facility] received a fax that requires your attention. The document has been indexed to the patient’s chart for your review.      Reason for sending: EXTERNAL MEDICAL RECORD NOTIFICATION    Documents Description: REFILL REQUEST METOPROLOL TARTRATE    Name of Sender: [facility]    Date Indexed: 1/30/25    Notes (if needed): SEE FAX IN CHART UNDER MEDICATIONS

## 2025-01-31 NOTE — ASSESSMENT & PLAN NOTE
Stable metoprolol succinate 25 mg daily for rate control.  Not currently on anticoagulation but does take aspirin 81 mg daily.  Status post cardioversion.

## 2025-01-31 NOTE — ASSESSMENT & PLAN NOTE
Ascending aortic aneurysm s/p surgical repair on 8/2/2023 with a 26 mm graft by Dr. Velasco.  Her sister was recently diagnosed with 2 intracerebral aneurysms and the vascular surgeon recommended that Lucina be checked for same.  Order placed.  Orders:    CT Angiogram Head; Future

## 2025-01-31 NOTE — TELEPHONE ENCOUNTER
Rx Refill Note  Requested Prescriptions     Pending Prescriptions Disp Refills    metoprolol succinate XL (TOPROL-XL) 25 MG 24 hr tablet 90 tablet 3     Sig: Take 1 tablet by mouth Daily.        LAST OFFICE VISIT:  11/20/2024     NEXT OFFICE VISIT:  6/2/2025     Does the medication requests match the last office note:    [x] Yes   [] No    Does this refill request meet protocol details for MA to approve:     [x] Yes   [] No

## 2025-01-31 NOTE — ASSESSMENT & PLAN NOTE
Stable on sertraline 150 mg daily, bupropion 300 mg daily and trazodone 100 mg nightly.  Refills were not needed today.  Continue to monitor.

## 2025-01-31 NOTE — PROGRESS NOTES
"Chief Complaint  Annual Exam    Subjective          Lucina Payan presents to [facility] today for her annual physical.      She is UTD on colonoscopy, last done 12/2019 and this showed diverticulosis.  Ten year repeat recommended.  She is up-to-date on pap smear, last done 4/2021 and this was NIL with negative high-risk HPV.  Five-year repeat recommended.  She is due for mammogram, last done 10/2023 and this was normal.  She is UTD on Tdap (1/2024), flu (9/2024).  She is due for COVID, Shingrix.  She is UTD on routine labs.     Her sister was diagnosed with cerebral aneurysm.  Also has thoracic aneurysm, just like Lucina.  Her sister's vascular surgeon recommended that she be checked as well.    She is on bupropion, sertraline and trazodone for depression.  Mood has been \"eh, it's going.\"  She is still grieving the loss of her  Hayder.  Both her friend and dog are doing better health-wise.  She is sleeping fairly well.    She is on metoprolol tartrate for rate control of atrial fib.  S/p FLORENTINO-guided cardioversion 8/2023 as well as pacemaker placement for sick sinus syndrome.  She is no longer using Eliquis d/t cost (she unfortunately did not qualify for rx assistance).  She is following with Cardiology Dr. Light.  She has previously been on amiodarone.  Her BP has been well-controlled.  Denies chest pain, palpitations or shortness of breath.  She is on torsemide and K+ for diastolic heart failure.  She has a mobile cardiac monitor for Cards that has reported a wide QRS to her intermittently.       S/p ascending aortic aneurysm repair by Dr. Velasco 8/2023.      She is on atorvastatin for hyperlipidemia.  Denies myalgias.    She is on levothyroxine for hypothyroidism.  Denies heat/cold intolerance, changes in her skin.    Review of Systems   Constitutional:  Negative for chills, fatigue and fever.   HENT:  Negative for congestion, hearing loss and rhinorrhea.    Eyes:  Negative " for pain and visual disturbance.   Respiratory:  Negative for cough and shortness of breath.    Cardiovascular:  Negative for chest pain and palpitations.   Gastrointestinal:  Negative for abdominal pain, constipation, diarrhea, nausea and vomiting.   Genitourinary:  Negative for dysuria and hematuria.   Musculoskeletal:  Negative for arthralgias and myalgias.   Skin:  Negative for rash.   Neurological:  Negative for weakness and numbness.   Psychiatric/Behavioral:  Positive for dysphoric mood. Negative for sleep disturbance. The patient is not nervous/anxious.          Current Outpatient Medications:     Aspirin Low Dose 81 MG EC tablet, Take 1 tablet by mouth Daily., Disp: 90 tablet, Rfl: 3    atorvastatin (LIPITOR) 40 MG tablet, TAKE ONE TABLET BY MOUTH ONCE NIGHTLY, Disp: 90 tablet, Rfl: 3    buPROPion XL (WELLBUTRIN XL) 300 MG 24 hr tablet, Take 1 tablet by mouth Daily., Disp: 90 tablet, Rfl: 1    Cinnamon 500 MG tablet, Take 500 mg by mouth Daily., Disp: , Rfl:     coenzyme Q10 100 MG capsule, Take 1 capsule by mouth Every Night., Disp: , Rfl:     famotidine (PEPCID) 40 MG tablet, Take 1 tablet by mouth Daily., Disp: 90 tablet, Rfl: 1    ferrous sulfate (FeroSul) 325 (65 FE) MG tablet, Every other night, Disp: 45 tablet, Rfl: 3    ibuprofen (ADVIL,MOTRIN) 600 MG tablet, Take 1 tablet by mouth As Needed., Disp: , Rfl:     levothyroxine (SYNTHROID, LEVOTHROID) 112 MCG tablet, Take 1 tablet by mouth Every Morning., Disp: 90 tablet, Rfl: 1    Magnesium 400 MG capsule, Take 400 mg by mouth Daily., Disp: , Rfl:     metoprolol succinate XL (TOPROL-XL) 25 MG 24 hr tablet, Take 1 tablet by mouth Daily., Disp: 90 tablet, Rfl: 3    potassium chloride ER (K-TAB) 20 MEQ tablet controlled-release ER tablet, TAKE 1 TABLET BY MOUTH TWICE A DAY, Disp: 60 tablet, Rfl: 3    sertraline (ZOLOFT) 50 MG tablet, Take 3 tablets by mouth Daily., Disp: 270 tablet, Rfl: 1    torsemide (DEMADEX) 20 MG tablet, TAKE ONE TABLET BY MOUTH  "DAILY, Disp: 90 tablet, Rfl: 3    traZODone (DESYREL) 100 MG tablet, Take 1 tablet by mouth every night at bedtime., Disp: 90 tablet, Rfl: 1    Allergies:  Demerol [meperidine], Morphine, Silver, and Lisinopril      Objective   Vital Signs:   Vitals:    01/31/25 1020   BP: 115/57   BP Location: Right arm   Patient Position: Sitting   Cuff Size: Large Adult   Pulse: 60   Temp: 98.2 °F (36.8 °C)   TempSrc: Oral   SpO2: 96%   Weight: 85.7 kg (189 lb)   Height: 170.2 cm (67.01\")     Body mass index is 29.59 kg/m².         Physical Exam  Vitals reviewed.   Constitutional:       General: She is not in acute distress.     Appearance: Normal appearance. She is well-developed.   HENT:      Head: Normocephalic and atraumatic.      Right Ear: External ear normal.      Left Ear: External ear normal.      Mouth/Throat:      Mouth: Mucous membranes are moist.   Eyes:      Extraocular Movements: Extraocular movements intact.      Conjunctiva/sclera: Conjunctivae normal.      Pupils: Pupils are equal, round, and reactive to light.   Cardiovascular:      Rate and Rhythm: Normal rate and regular rhythm.      Heart sounds: No murmur heard.  Pulmonary:      Effort: Pulmonary effort is normal.      Breath sounds: Normal breath sounds. No wheezing, rhonchi or rales.   Abdominal:      General: Bowel sounds are normal. There is no distension.      Palpations: Abdomen is soft.      Tenderness: There is no abdominal tenderness.   Musculoskeletal:         General: Normal range of motion.   Skin:     General: Skin is warm and dry.   Neurological:      Mental Status: She is alert and oriented to person, place, and time.      Deep Tendon Reflexes: Reflexes normal.   Psychiatric:         Mood and Affect: Mood and affect normal.         Behavior: Behavior normal.         Thought Content: Thought content normal.         Judgment: Judgment normal.          Lab Results   Component Value Date    GLUCOSE 99 09/27/2024    BUN 13 09/27/2024    CREATININE " 0.78 09/27/2024    EGFR 87.1 09/27/2024    BCR 16.7 09/27/2024    K 3.7 09/27/2024    CO2 29.9 (H) 09/27/2024    CALCIUM 9.4 09/27/2024    ALBUMIN 4.3 09/27/2024    BILITOT 0.3 09/27/2024    AST 30 09/27/2024    ALT 20 09/27/2024       Lab Results   Component Value Date    CHOL 128 04/26/2024    CHLPL 161 03/18/2021    TRIG 63 04/26/2024    HDL 55 04/26/2024    LDL 60 04/26/2024       Lab Results   Component Value Date    WBC 6.74 09/27/2024    HGB 14.6 09/27/2024    HCT 44.1 09/27/2024    MCV 96.1 09/27/2024     09/27/2024            Assessment and Plan    Assessment & Plan  Annual physical exam  She is UTD on colonoscopy, last done 12/2019 and this showed diverticulosis.  Ten year repeat recommended.  She is up-to-date on pap smear, last done 4/2021 and this was NIL with negative high-risk HPV.  Five-year repeat recommended.  She is due for mammogram, last done 10/2023 and this was normal.  She is UTD on Tdap (1/2024), flu (9/2024).  She is due for COVID, Shingrix; declined both today.  She is UTD on routine labs.        Paroxysmal atrial fibrillation  Stable metoprolol succinate 25 mg daily for rate control.  Not currently on anticoagulation but does take aspirin 81 mg daily.  Status post cardioversion.    Mixed hyperlipidemia   Stable on atorvastatin 40 mg daily.  Refills were not needed today.  Labs were not due today.  Continue to monitor.       Chronic diastolic (congestive) heart failure  Stable metoprolol succinate 25 mg daily.  No refills needed.       SSS (sick sinus syndrome)  Following with Cardiology.  Status post pacemaker placement with battery change 10/2023.       Presence of cardiac pacemaker  Following with Cardiology.  Status post pacemaker placement with battery change 10/2023.       Aneurysm of ascending aorta without rupture  Ascending aortic aneurysm s/p surgical repair on 8/2/2023 with a 26 mm graft by Dr. Velasco.  Her sister was recently diagnosed with 2 intracerebral aneurysms and  the vascular surgeon recommended that Lucina be checked for same.  Order placed.  Orders:    CT Angiogram Head; Future    Family history of aneurysm of blood vessel of brain  As above.  Orders:    CT Angiogram Head; Future    Acquired hypothyroidism  Stable on levothyroxine 112 mcg daily.  Refills were not needed today.  Labs were not due today.  Continue to monitor.       Moderate episode of recurrent major depressive disorder  Stable on sertraline 150 mg daily, bupropion 300 mg daily and trazodone 100 mg nightly.  Refills were not needed today.  Continue to monitor.       Screening mammogram for breast cancer    Orders:    Mammo Screening Digital Tomosynthesis Bilateral With CAD; Future              Follow Up   Return in about 4 months (around 5/31/2025) for Recheck.  Patient was given instructions and counseling regarding her condition or for health maintenance advice. Please see specific information pulled into the AVS if appropriate.         01/31/2025

## 2025-01-31 NOTE — ASSESSMENT & PLAN NOTE
Stable on atorvastatin 40 mg daily.  Refills were not needed today.  Labs were not due today.  Continue to monitor.

## 2025-02-03 DIAGNOSIS — E03.9 ACQUIRED HYPOTHYROIDISM: ICD-10-CM

## 2025-02-03 DIAGNOSIS — E78.2 MIXED HYPERLIPIDEMIA: ICD-10-CM

## 2025-02-03 DIAGNOSIS — I50.32 CHRONIC DIASTOLIC (CONGESTIVE) HEART FAILURE: ICD-10-CM

## 2025-02-03 RX ORDER — POTASSIUM CHLORIDE 1500 MG/1
20 TABLET, EXTENDED RELEASE ORAL 2 TIMES DAILY
Qty: 180 TABLET | Refills: 3 | Status: SHIPPED | OUTPATIENT
Start: 2025-02-03

## 2025-02-03 RX ORDER — LEVOTHYROXINE SODIUM 112 UG/1
112 TABLET ORAL EVERY MORNING
Qty: 30 TABLET | OUTPATIENT
Start: 2025-02-03

## 2025-02-03 RX ORDER — ATORVASTATIN CALCIUM 40 MG/1
40 TABLET, FILM COATED ORAL NIGHTLY
Qty: 90 TABLET | Refills: 3 | Status: SHIPPED | OUTPATIENT
Start: 2025-02-03

## 2025-02-03 RX ORDER — TORSEMIDE 20 MG/1
20 TABLET ORAL DAILY
Qty: 90 TABLET | Refills: 3 | Status: SHIPPED | OUTPATIENT
Start: 2025-02-03

## 2025-02-03 NOTE — TELEPHONE ENCOUNTER
Rx Refill Note  Requested Prescriptions     Pending Prescriptions Disp Refills    atorvastatin (LIPITOR) 40 MG tablet 90 tablet 3     Sig: Take 1 tablet by mouth Every Night.    potassium chloride ER (K-TAB) 20 MEQ tablet controlled-release ER tablet 60 tablet 3     Sig: Take 1 tablet by mouth 2 (Two) Times a Day.    torsemide (DEMADEX) 20 MG tablet 90 tablet 3     Sig: Take 1 tablet by mouth Daily.        LAST OFFICE VISIT:  11/20/2024     NEXT OFFICE VISIT:  6/2/2025     Does the medication requests match the last office note:    [x] Yes   [] No    Does this refill request meet protocol details for MA to approve:     [x] Yes   [] No

## 2025-02-14 ENCOUNTER — HOSPITAL ENCOUNTER (OUTPATIENT)
Dept: CT IMAGING | Facility: HOSPITAL | Age: 60
Discharge: HOME | End: 2025-02-14
Admitting: FAMILY MEDICINE
Payer: COMMERCIAL

## 2025-02-14 DIAGNOSIS — I71.21 ANEURYSM OF ASCENDING AORTA WITHOUT RUPTURE: ICD-10-CM

## 2025-02-14 DIAGNOSIS — Z82.49 FAMILY HISTORY OF ANEURYSM OF BLOOD VESSEL OF BRAIN: ICD-10-CM

## 2025-02-14 LAB
CREAT BLDA-MCNC: 1.1 MG/DL (ref 0.6–1.3)
EGFRCR SERPLBLD CKD-EPI 2021: 57.3 ML/MIN/1.73

## 2025-02-14 PROCEDURE — 25510000001 IOPAMIDOL PER 1 ML: Performed by: FAMILY MEDICINE

## 2025-02-14 PROCEDURE — 82565 ASSAY OF CREATININE: CPT

## 2025-02-14 PROCEDURE — 70496 CT ANGIOGRAPHY HEAD: CPT

## 2025-02-14 RX ORDER — IOPAMIDOL 755 MG/ML
100 INJECTION, SOLUTION INTRAVASCULAR
Status: COMPLETED | OUTPATIENT
Start: 2025-02-14 | End: 2025-02-14

## 2025-02-14 RX ADMIN — IOPAMIDOL 100 ML: 755 INJECTION, SOLUTION INTRAVENOUS at 13:45

## 2025-02-17 RX ORDER — POTASSIUM CHLORIDE 1500 MG/1
20 TABLET, EXTENDED RELEASE ORAL 2 TIMES DAILY
Qty: 60 TABLET | OUTPATIENT
Start: 2025-02-17

## 2025-02-18 RX ORDER — POTASSIUM CHLORIDE 1500 MG/1
20 TABLET, EXTENDED RELEASE ORAL 2 TIMES DAILY
Qty: 60 TABLET | Refills: 2 | Status: SHIPPED | OUTPATIENT
Start: 2025-02-18

## 2025-03-26 DIAGNOSIS — F33.1 MODERATE EPISODE OF RECURRENT MAJOR DEPRESSIVE DISORDER: ICD-10-CM

## 2025-03-26 DIAGNOSIS — K21.9 GASTROESOPHAGEAL REFLUX DISEASE WITHOUT ESOPHAGITIS: ICD-10-CM

## 2025-03-26 RX ORDER — FAMOTIDINE 40 MG/1
40 TABLET, FILM COATED ORAL DAILY
Qty: 90 TABLET | Refills: 1 | Status: SHIPPED | OUTPATIENT
Start: 2025-03-26

## 2025-04-04 ENCOUNTER — HOSPITAL ENCOUNTER (OUTPATIENT)
Dept: MAMMOGRAPHY | Facility: HOSPITAL | Age: 60
Discharge: HOME | End: 2025-04-04
Admitting: FAMILY MEDICINE
Payer: COMMERCIAL

## 2025-04-04 ENCOUNTER — OFFICE VISIT (OUTPATIENT)
Dept: FAMILY MEDICINE CLINIC | Age: 60
End: 2025-04-04
Payer: COMMERCIAL

## 2025-04-04 VITALS
WEIGHT: 189.9 LBS | OXYGEN SATURATION: 94 % | SYSTOLIC BLOOD PRESSURE: 135 MMHG | HEIGHT: 67 IN | TEMPERATURE: 98.5 F | HEART RATE: 60 BPM | DIASTOLIC BLOOD PRESSURE: 72 MMHG | BODY MASS INDEX: 29.81 KG/M2

## 2025-04-04 DIAGNOSIS — Z12.31 SCREENING MAMMOGRAM FOR BREAST CANCER: ICD-10-CM

## 2025-04-04 DIAGNOSIS — S93.402A SPRAIN OF LEFT ANKLE, UNSPECIFIED LIGAMENT, INITIAL ENCOUNTER: Primary | ICD-10-CM

## 2025-04-04 PROCEDURE — 77067 SCR MAMMO BI INCL CAD: CPT

## 2025-04-04 PROCEDURE — 77063 BREAST TOMOSYNTHESIS BI: CPT

## 2025-04-04 NOTE — PROGRESS NOTES
Chief Complaint  Ankle Pain (Bruise on L ankle, tender to touch)    Subjective          Lucina Payan presents to [facility] today for L ankle pain.    This morning, she awoke to discover that her L ankle was covered in dark purple bruising.  Tender to touch.  +Warm.  She did nothing all day long (or over the past few days) to precipitate such an injury.  She is able to bear weight without problems.        Current Outpatient Medications:     Aspirin Low Dose 81 MG EC tablet, Take 1 tablet by mouth Daily., Disp: 90 tablet, Rfl: 3    atorvastatin (LIPITOR) 40 MG tablet, Take 1 tablet by mouth Every Night., Disp: 90 tablet, Rfl: 3    buPROPion XL (WELLBUTRIN XL) 300 MG 24 hr tablet, Take 1 tablet by mouth Daily., Disp: 90 tablet, Rfl: 1    Cinnamon 500 MG tablet, Take 500 mg by mouth Daily., Disp: , Rfl:     coenzyme Q10 100 MG capsule, Take 1 capsule by mouth Every Night., Disp: , Rfl:     famotidine (PEPCID) 40 MG tablet, TAKE 1 TABLET BY MOUTH DAILY, Disp: 90 tablet, Rfl: 1    ferrous sulfate (FeroSul) 325 (65 FE) MG tablet, Every other night, Disp: 45 tablet, Rfl: 3    ibuprofen (ADVIL,MOTRIN) 600 MG tablet, Take 1 tablet by mouth As Needed., Disp: , Rfl:     levothyroxine (SYNTHROID, LEVOTHROID) 112 MCG tablet, Take 1 tablet by mouth Every Morning., Disp: 90 tablet, Rfl: 1    Magnesium 400 MG capsule, Take 400 mg by mouth Daily., Disp: , Rfl:     metoprolol succinate XL (TOPROL-XL) 25 MG 24 hr tablet, Take 1 tablet by mouth Daily., Disp: 90 tablet, Rfl: 3    potassium chloride ER (K-TAB) 20 MEQ tablet controlled-release ER tablet, TAKE 1 TABLET BY MOUTH TWICE A DAY, Disp: 60 tablet, Rfl: 2    sertraline (ZOLOFT) 50 MG tablet, TAKE THREE TABLETS BY MOUTH DAILY, Disp: 270 tablet, Rfl: 1    torsemide (DEMADEX) 20 MG tablet, Take 1 tablet by mouth Daily., Disp: 90 tablet, Rfl: 3    traZODone (DESYREL) 100 MG tablet, Take 1 tablet by mouth every night at bedtime., Disp: 90 tablet, Rfl:  "1  There are no discontinued medications.      Allergies:  Demerol [meperidine], Morphine, Silver, and Lisinopril      Objective   Vital Signs:   Vitals:    04/04/25 1308   BP: 135/72   BP Location: Left arm   Patient Position: Sitting   Cuff Size: Large Adult   Pulse: 60   Temp: 98.5 °F (36.9 °C)   TempSrc: Oral   SpO2: 94%   Weight: 86.1 kg (189 lb 14.4 oz)   Height: 170.2 cm (67.01\")     Body mass index is 29.74 kg/m².           Physical Exam  Constitutional:       Appearance: Normal appearance.   HENT:      Head: Normocephalic and atraumatic.   Eyes:      Extraocular Movements: Extraocular movements intact.      Conjunctiva/sclera: Conjunctivae normal.   Pulmonary:      Effort: Pulmonary effort is normal. No respiratory distress.   Musculoskeletal:         General: Tenderness (mildly TTP over medial malleolus, able to bear weight without any pain; no limp) present. Normal range of motion.   Skin:     General: Skin is warm and dry.   Neurological:      General: No focal deficit present.      Mental Status: She is alert and oriented to person, place, and time.   Psychiatric:         Mood and Affect: Mood normal.         Behavior: Behavior normal.         Thought Content: Thought content normal.         Judgment: Judgment normal.              Lab Results   Component Value Date    GLUCOSE 99 09/27/2024    BUN 13 09/27/2024    CREATININE 1.10 02/14/2025    EGFRIFNONA 76 10/15/2021    BCR 16.7 09/27/2024    K 3.7 09/27/2024    CO2 29.9 (H) 09/27/2024    CALCIUM 9.4 09/27/2024    ALBUMIN 4.3 09/27/2024    AST 30 09/27/2024    ALT 20 09/27/2024       Lab Results   Component Value Date    CHOL 128 04/26/2024    CHLPL 161 03/18/2021    TRIG 63 04/26/2024    HDL 55 04/26/2024    LDL 60 04/26/2024       Lab Results   Component Value Date    WBC 6.74 09/27/2024    HGB 14.6 09/27/2024    HCT 44.1 09/27/2024    MCV 96.1 09/27/2024     09/27/2024            Assessment and Plan    Assessment & Plan  Sprain of left ankle, " unspecified ligament, initial encounter  We will pursue conservative RICE therapy with rest, ice applied 10 to 20 minutes 2-3 times daily as able, compression with a brace or Ace wrap, and elevation when she is resting.  If symptoms worsen or fail to improve, she can contact me or return to clinic.  At that point, we might plan to get an x-ray.                  Follow Up   Return if symptoms worsen or fail to improve, for or as scheduled 5/29/25.  Patient was given instructions and counseling regarding her condition or for health maintenance advice. Please see specific information pulled into the AVS if appropriate.           04/04/2025

## 2025-04-19 DIAGNOSIS — F33.1 MODERATE EPISODE OF RECURRENT MAJOR DEPRESSIVE DISORDER: ICD-10-CM

## 2025-04-21 RX ORDER — BUPROPION HYDROCHLORIDE 300 MG/1
300 TABLET ORAL DAILY
Qty: 90 TABLET | Refills: 1 | OUTPATIENT
Start: 2025-04-21

## 2025-04-21 RX ORDER — TRAZODONE HYDROCHLORIDE 100 MG/1
100 TABLET ORAL
Qty: 90 TABLET | Refills: 1 | OUTPATIENT
Start: 2025-04-21

## 2025-04-24 DIAGNOSIS — F33.1 MODERATE EPISODE OF RECURRENT MAJOR DEPRESSIVE DISORDER: ICD-10-CM

## 2025-04-24 RX ORDER — BUPROPION HYDROCHLORIDE 300 MG/1
300 TABLET ORAL DAILY
Qty: 90 TABLET | Refills: 1 | Status: SHIPPED | OUTPATIENT
Start: 2025-04-24

## 2025-04-24 RX ORDER — TRAZODONE HYDROCHLORIDE 100 MG/1
100 TABLET ORAL
Qty: 90 TABLET | Refills: 1 | Status: SHIPPED | OUTPATIENT
Start: 2025-04-24

## 2025-05-29 ENCOUNTER — OFFICE VISIT (OUTPATIENT)
Dept: FAMILY MEDICINE CLINIC | Age: 60
End: 2025-05-29
Payer: COMMERCIAL

## 2025-05-29 ENCOUNTER — LAB (OUTPATIENT)
Dept: LAB | Facility: HOSPITAL | Age: 60
End: 2025-05-29
Payer: COMMERCIAL

## 2025-05-29 ENCOUNTER — HOSPITAL ENCOUNTER (OUTPATIENT)
Dept: GENERAL RADIOLOGY | Facility: HOSPITAL | Age: 60
Discharge: HOME | End: 2025-05-29
Payer: COMMERCIAL

## 2025-05-29 VITALS
SYSTOLIC BLOOD PRESSURE: 111 MMHG | HEIGHT: 67 IN | BODY MASS INDEX: 29.54 KG/M2 | DIASTOLIC BLOOD PRESSURE: 78 MMHG | OXYGEN SATURATION: 92 % | TEMPERATURE: 97.8 F | WEIGHT: 188.2 LBS | HEART RATE: 60 BPM

## 2025-05-29 DIAGNOSIS — K21.9 GASTROESOPHAGEAL REFLUX DISEASE WITHOUT ESOPHAGITIS: ICD-10-CM

## 2025-05-29 DIAGNOSIS — E78.2 MIXED HYPERLIPIDEMIA: ICD-10-CM

## 2025-05-29 DIAGNOSIS — F33.1 MODERATE EPISODE OF RECURRENT MAJOR DEPRESSIVE DISORDER: ICD-10-CM

## 2025-05-29 DIAGNOSIS — M25.551 ACUTE RIGHT HIP PAIN: Primary | ICD-10-CM

## 2025-05-29 DIAGNOSIS — E03.9 ACQUIRED HYPOTHYROIDISM: ICD-10-CM

## 2025-05-29 DIAGNOSIS — I48.0 PAROXYSMAL ATRIAL FIBRILLATION: ICD-10-CM

## 2025-05-29 DIAGNOSIS — M25.551 ACUTE RIGHT HIP PAIN: ICD-10-CM

## 2025-05-29 LAB
ALBUMIN SERPL-MCNC: 4.3 G/DL (ref 3.5–5.2)
ALBUMIN/GLOB SERPL: 1.7 G/DL
ALP SERPL-CCNC: 136 U/L (ref 39–117)
ALT SERPL W P-5'-P-CCNC: 22 U/L (ref 1–33)
ANION GAP SERPL CALCULATED.3IONS-SCNC: 9.1 MMOL/L (ref 5–15)
AST SERPL-CCNC: 35 U/L (ref 1–32)
BILIRUB SERPL-MCNC: 0.2 MG/DL (ref 0–1.2)
BUN SERPL-MCNC: 15 MG/DL (ref 8–23)
BUN/CREAT SERPL: 15.6 (ref 7–25)
CALCIUM SPEC-SCNC: 9.7 MG/DL (ref 8.6–10.5)
CHLORIDE SERPL-SCNC: 97 MMOL/L (ref 98–107)
CHOLEST SERPL-MCNC: 129 MG/DL (ref 0–200)
CO2 SERPL-SCNC: 31.9 MMOL/L (ref 22–29)
CREAT SERPL-MCNC: 0.96 MG/DL (ref 0.57–1)
EGFRCR SERPLBLD CKD-EPI 2021: 67.5 ML/MIN/1.73
GLOBULIN UR ELPH-MCNC: 2.6 GM/DL
GLUCOSE SERPL-MCNC: 92 MG/DL (ref 65–99)
HDLC SERPL-MCNC: 57 MG/DL (ref 40–60)
LDLC SERPL CALC-MCNC: 57 MG/DL (ref 0–100)
LDLC/HDLC SERPL: 1.01 {RATIO}
POTASSIUM SERPL-SCNC: 3.8 MMOL/L (ref 3.5–5.2)
PROT SERPL-MCNC: 6.9 G/DL (ref 6–8.5)
SODIUM SERPL-SCNC: 138 MMOL/L (ref 136–145)
TRIGL SERPL-MCNC: 73 MG/DL (ref 0–150)
TSH SERPL DL<=0.05 MIU/L-ACNC: 2.39 UIU/ML (ref 0.27–4.2)
VLDLC SERPL-MCNC: 15 MG/DL (ref 5–40)

## 2025-05-29 PROCEDURE — 36415 COLL VENOUS BLD VENIPUNCTURE: CPT

## 2025-05-29 PROCEDURE — 80053 COMPREHEN METABOLIC PANEL: CPT

## 2025-05-29 PROCEDURE — 84443 ASSAY THYROID STIM HORMONE: CPT

## 2025-05-29 PROCEDURE — 73502 X-RAY EXAM HIP UNI 2-3 VIEWS: CPT

## 2025-05-29 PROCEDURE — 99214 OFFICE O/P EST MOD 30 MIN: CPT | Performed by: FAMILY MEDICINE

## 2025-05-29 PROCEDURE — 80061 LIPID PANEL: CPT

## 2025-05-29 RX ORDER — LEVOTHYROXINE SODIUM 112 UG/1
112 TABLET ORAL EVERY MORNING
Qty: 90 TABLET | Refills: 1 | Status: SHIPPED | OUTPATIENT
Start: 2025-05-29

## 2025-05-29 RX ORDER — TRAZODONE HYDROCHLORIDE 100 MG/1
100 TABLET ORAL
Qty: 90 TABLET | Refills: 1 | Status: SHIPPED | OUTPATIENT
Start: 2025-05-29

## 2025-05-29 RX ORDER — FAMOTIDINE 40 MG/1
40 TABLET, FILM COATED ORAL DAILY
Qty: 90 TABLET | Refills: 1 | Status: SHIPPED | OUTPATIENT
Start: 2025-05-29

## 2025-05-29 NOTE — PROGRESS NOTES
"Chief Complaint  Depression and Med Refill    Subjective          Lucina Payan presents to [facility] today for follow-up of routine issues.    R-sided \"deep\" hip pain for the past month, worst with sitting.  Pain is characterized as a deep ache.  Also worse with walking long distances.  No alleviating factors.  She has tried Tylenol Arthritis but doesn't do much.  Also using ibuprofen as needed but that doesn't do much either except take the edge off.  No injury or trauma reported    CTA head was negative after her sister was dxed with cerebral aneurysm.  Does have a history of ascending aortic aneurysm, s/p repair by Dr. Velasco 8/2023.     She is on bupropion, sertraline and trazodone for depression.  Mood has been \"off and on.\"  She has been spending a lot of time outdoors and that helps.    She is on metoprolol tartrate for rate control of atrial fib.  S/p FLORENTINO-guided cardioversion 8/2023.  +Pacemaker placement for SSS.  She is no longer using Eliquis d/t cost (did not qualify for rx assistance).  She follows with Cardiology Dr. Light.  She has previously been on amiodarone.  She is on torsemide and K+ for diastolic CHF.  She has an appt with Cardiology next week.    She is on atorvastatin for HLD.    She is on levothyroxine for hypothyroidism.      Current Outpatient Medications:     Aspirin Low Dose 81 MG EC tablet, Take 1 tablet by mouth Daily., Disp: 90 tablet, Rfl: 3    atorvastatin (LIPITOR) 40 MG tablet, Take 1 tablet by mouth Every Night., Disp: 90 tablet, Rfl: 3    buPROPion XL (WELLBUTRIN XL) 300 MG 24 hr tablet, Take 1 tablet by mouth Daily., Disp: 90 tablet, Rfl: 1    coenzyme Q10 100 MG capsule, Take 1 capsule by mouth Every Night., Disp: , Rfl:     famotidine (PEPCID) 40 MG tablet, Take 1 tablet by mouth Daily., Disp: 90 tablet, Rfl: 1    ferrous sulfate (FeroSul) 325 (65 FE) MG tablet, Every other night (Patient taking differently: Twice a week), Disp: 45 " "tablet, Rfl: 3    ibuprofen (ADVIL,MOTRIN) 600 MG tablet, Take 1 tablet by mouth As Needed., Disp: , Rfl:     levothyroxine (SYNTHROID, LEVOTHROID) 112 MCG tablet, Take 1 tablet by mouth Every Morning., Disp: 90 tablet, Rfl: 1    metoprolol succinate XL (TOPROL-XL) 25 MG 24 hr tablet, Take 1 tablet by mouth Daily., Disp: 90 tablet, Rfl: 3    potassium chloride ER (K-TAB) 20 MEQ tablet controlled-release ER tablet, TAKE 1 TABLET BY MOUTH TWICE A DAY, Disp: 60 tablet, Rfl: 2    sertraline (ZOLOFT) 50 MG tablet, Take 3 tablets by mouth Daily., Disp: 270 tablet, Rfl: 1    torsemide (DEMADEX) 20 MG tablet, Take 1 tablet by mouth Daily., Disp: 90 tablet, Rfl: 3    traZODone (DESYREL) 100 MG tablet, Take 1 tablet by mouth every night at bedtime., Disp: 90 tablet, Rfl: 1  Medications Discontinued During This Encounter   Medication Reason    Cinnamon 500 MG tablet Historical Med - Therapy completed    Magnesium 400 MG capsule Historical Med - Therapy completed    levothyroxine (SYNTHROID, LEVOTHROID) 112 MCG tablet Reorder    sertraline (ZOLOFT) 50 MG tablet Reorder    famotidine (PEPCID) 40 MG tablet Reorder    traZODone (DESYREL) 100 MG tablet Reorder         Allergies:  Demerol [meperidine], Morphine, Silver, and Lisinopril      Objective   Vital Signs:   Vitals:    05/29/25 1541   BP: 111/78   BP Location: Left arm   Patient Position: Sitting   Cuff Size: Large Adult   Pulse: 60   Temp: 97.8 °F (36.6 °C)   TempSrc: Oral   SpO2: 92%   Weight: 85.4 kg (188 lb 3.2 oz)   Height: 170.2 cm (67.01\")     Body mass index is 29.47 kg/m².           Physical Exam  Vitals reviewed.   Constitutional:       General: She is not in acute distress.     Appearance: Normal appearance. She is well-developed.   HENT:      Head: Normocephalic and atraumatic.      Right Ear: External ear normal.      Left Ear: External ear normal.   Eyes:      Extraocular Movements: Extraocular movements intact.      Conjunctiva/sclera: Conjunctivae normal.     "  Pupils: Pupils are equal, round, and reactive to light.   Cardiovascular:      Rate and Rhythm: Normal rate and regular rhythm.      Heart sounds: No murmur heard.  Pulmonary:      Effort: Pulmonary effort is normal.      Breath sounds: Normal breath sounds. No wheezing, rhonchi or rales.   Abdominal:      General: Bowel sounds are normal. There is no distension.      Palpations: Abdomen is soft.      Tenderness: There is no abdominal tenderness.   Musculoskeletal:         General: Normal range of motion.   Neurological:      Mental Status: She is alert.   Psychiatric:         Mood and Affect: Affect normal.           Lab Results   Component Value Date    GLUCOSE 99 09/27/2024    BUN 13 09/27/2024    CREATININE 1.10 02/14/2025    EGFRIFNONA 76 10/15/2021    BCR 16.7 09/27/2024    K 3.7 09/27/2024    CO2 29.9 (H) 09/27/2024    CALCIUM 9.4 09/27/2024    ALBUMIN 4.3 09/27/2024    AST 30 09/27/2024    ALT 20 09/27/2024       Lab Results   Component Value Date    CHOL 128 04/26/2024    CHLPL 161 03/18/2021    TRIG 63 04/26/2024    HDL 55 04/26/2024    LDL 60 04/26/2024       Lab Results   Component Value Date    WBC 6.74 09/27/2024    HGB 14.6 09/27/2024    HCT 44.1 09/27/2024    MCV 96.1 09/27/2024     09/27/2024            Assessment and Plan    Assessment & Plan  Acute right hip pain  Acute right hip pain for the last month.  No injury or trauma reported.  Checking x-ray today.  She has been using Tylenol and ibuprofen to minimal effect.  Orders:    XR Hip With or Without Pelvis 2 - 3 View Right; Future    Acquired hypothyroidism  Stable on levothyroxine 112 mcg daily.  Refills were needed today.  Labs were due today.  Continue to monitor.  Orders:    levothyroxine (SYNTHROID, LEVOTHROID) 112 MCG tablet; Take 1 tablet by mouth Every Morning.    TSH; Future    Moderate episode of recurrent major depressive disorder    Doing well enough all things considered.  She is stable on sertraline 150 mg daily and  trazodone 100 mg nightly.  No changes desired to medications at this time.  Continue to monitor.  She has been doing better now that she can get outside more as the weather has gotten nicer.  Orders:    sertraline (ZOLOFT) 50 MG tablet; Take 3 tablets by mouth Daily.    traZODone (DESYREL) 100 MG tablet; Take 1 tablet by mouth every night at bedtime.    Gastroesophageal reflux disease without esophagitis  Stable on famotidine 40 mg daily.  Refills were needed today.  Continue to monitor.   Orders:    famotidine (PEPCID) 40 MG tablet; Take 1 tablet by mouth Daily.    Paroxysmal atrial fibrillation  Stable metoprolol succinate 25 mg daily for rate control.  Not currently on anticoagulation but does take aspirin 81 mg daily.  Status post cardioversion.        Mixed hyperlipidemia     Stable on atorvastatin 40 mg daily.  Refills were needed today.  Labs were due today.  Continue to monitor.  Orders:    Lipid Panel; Future    Comprehensive Metabolic Panel; Future              Follow Up   Return in about 4 months (around 9/29/2025) for Recheck.  Patient was given instructions and counseling regarding her condition or for health maintenance advice. Please see specific information pulled into the AVS if appropriate.           05/29/2025

## 2025-05-29 NOTE — ASSESSMENT & PLAN NOTE
Stable on atorvastatin 40 mg daily.  Refills were needed today.  Labs were due today.  Continue to monitor.  Orders:    Lipid Panel; Future    Comprehensive Metabolic Panel; Future     · Lisinopril stopped  Amlodipine changed to 5 mg p o  B i d  Per Nephrology  Continue atenolol 50mg daily with hold parameters    · Routine vitals

## 2025-05-29 NOTE — ASSESSMENT & PLAN NOTE
Stable on famotidine 40 mg daily.  Refills were needed today.  Continue to monitor.   Orders:    famotidine (PEPCID) 40 MG tablet; Take 1 tablet by mouth Daily.

## 2025-05-29 NOTE — ASSESSMENT & PLAN NOTE
Stable on levothyroxine 112 mcg daily.  Refills were needed today.  Labs were due today.  Continue to monitor.  Orders:    levothyroxine (SYNTHROID, LEVOTHROID) 112 MCG tablet; Take 1 tablet by mouth Every Morning.    TSH; Future

## 2025-05-29 NOTE — ASSESSMENT & PLAN NOTE
Doing well enough all things considered.  She is stable on sertraline 150 mg daily and trazodone 100 mg nightly.  No changes desired to medications at this time.  Continue to monitor.  She has been doing better now that she can get outside more as the weather has gotten nicer.  Orders:    sertraline (ZOLOFT) 50 MG tablet; Take 3 tablets by mouth Daily.    traZODone (DESYREL) 100 MG tablet; Take 1 tablet by mouth every night at bedtime.

## 2025-06-02 ENCOUNTER — CLINICAL SUPPORT NO REQUIREMENTS (OUTPATIENT)
Dept: CARDIOLOGY | Facility: CLINIC | Age: 60
End: 2025-06-02
Payer: COMMERCIAL

## 2025-06-02 ENCOUNTER — OFFICE VISIT (OUTPATIENT)
Dept: CARDIOLOGY | Facility: CLINIC | Age: 60
End: 2025-06-02
Payer: COMMERCIAL

## 2025-06-02 VITALS
DIASTOLIC BLOOD PRESSURE: 73 MMHG | WEIGHT: 188 LBS | HEART RATE: 60 BPM | HEIGHT: 67 IN | SYSTOLIC BLOOD PRESSURE: 121 MMHG | BODY MASS INDEX: 29.51 KG/M2

## 2025-06-02 DIAGNOSIS — Z95.0 PRESENCE OF CARDIAC PACEMAKER: Primary | ICD-10-CM

## 2025-06-02 DIAGNOSIS — I71.21 ANEURYSM OF ASCENDING AORTA WITHOUT RUPTURE: ICD-10-CM

## 2025-06-02 DIAGNOSIS — Z95.0 PRESENCE OF CARDIAC PACEMAKER: ICD-10-CM

## 2025-06-02 DIAGNOSIS — I48.0 PAROXYSMAL ATRIAL FIBRILLATION: Primary | ICD-10-CM

## 2025-06-02 DIAGNOSIS — E78.2 MIXED HYPERLIPIDEMIA: ICD-10-CM

## 2025-06-02 DIAGNOSIS — I49.5 SSS (SICK SINUS SYNDROME): ICD-10-CM

## 2025-06-02 DIAGNOSIS — I50.32 CHRONIC DIASTOLIC (CONGESTIVE) HEART FAILURE: ICD-10-CM

## 2025-06-02 PROCEDURE — 93280 PM DEVICE PROGR EVAL DUAL: CPT | Performed by: INTERNAL MEDICINE

## 2025-06-02 PROCEDURE — 99214 OFFICE O/P EST MOD 30 MIN: CPT | Performed by: FAMILY MEDICINE

## 2025-06-02 NOTE — PROGRESS NOTES
Chief Complaint  Follow-up    Subjective        History of Present Illness  Lucina Payan presents to [facility] CARDIOLOGY   Ms. Payan is a 59-year-old female coming in today for routine cardiac follow-up, and pacemaker device interrogation.  Cardiac wise she is doing well.  She has no new concerns today.  Specifically she is not complaining of any chest pains, palpitations or shortness of breath.  She is dealing with some chronic hip pain, but is seeing her PCP regarding this.  She had a sister diagnosed with a brain aneurysm, and due to her history of already having a aortic aneurysm, she did undergo imaging to screen for possible aneurysm for herself as well.  It was negative.        Past History:     1) Status post permanent pacemaker placement for bradycardia in 2013; 2) Ascending aortic aneurysm s/p surgical repair on 8/2/2023 with a 26 mm graft by Dr. Velasco. 3) Atrial fibrillation, following aneurysm surgery, status post FLORENTINO guided cardioversion.4) Essential hypertension 5) Mixed hyperlipidemia     Past Medical History:   Diagnosis Date    Allergic Seasonal    Arthritis 2021    Asthma 8/28/23    Bradycardia     Cardiac pacemaker     CHF (congestive heart failure) 9/2023    Cholelithiasis Removed 2003    Coronary artery disease     Depression     GERD (gastroesophageal reflux disease)     Heart murmur 1983    HL (hearing loss) 2021    Hyperlipidemia     Hypertension     Hypothyroidism     Obesity 1974    Pancreatitis 2003    Pneumonia 9/2023    Seizure     SSS (sick sinus syndrome)     Thoracic aortic aneurysm     Urinary tract infection Several    Visual impairment Wear bifocals       Allergies   Allergen Reactions    Demerol [Meperidine] Seizure    Morphine Seizure    Silver Other (See Comments)     Jimy silver - can be a base for certain creams -  Skin turns black     Lisinopril Headache        Past Surgical History:   Procedure Laterality Date    ADENOIDECTOMY  18984    APPENDECTOMY       BARIATRIC SURGERY      CARDIAC CATHETERIZATION      CARDIAC CATHETERIZATION N/A 2023    Procedure: Left Heart Cath;  Surgeon: Yayo Light MD;  Location: [facility] CATH INVASIVE LOCATION;  Service: Cardiovascular;  Laterality: N/A;    CARDIAC PACEMAKER PLACEMENT      [employer]    CARDIAC VALVE REPLACEMENT  2023     SECTION      x2    CHOLECYSTECTOMY      COLONOSCOPY      CORONARY ARTERY BYPASS GRAFT  23    GASTRIC BYPASS      LYMPH NODE BIOPSY      PACEMAKER REPLACEMENT N/A 10/24/2023    Procedure: PPM generator change - dual;  Surgeon: Praneeth Tamez MD;  Location: [facility] CATH INVASIVE LOCATION;  Service: Cardiovascular;  Laterality: N/A;    POSTPARTUM TUBAL LIGATION      TONSILLECTOMY      TONSILLECTOMY AND ADENOIDECTOMY      TUBAL ABDOMINAL LIGATION          Social History  She  reports that she has been smoking cigarettes. She started smoking about 4 years ago. She has a 4.9 pack-year smoking history. She has never been exposed to tobacco smoke. She has never used smokeless tobacco. She reports current alcohol use of about 1.0 standard drink of alcohol per week. She reports that she does not use drugs.    Family History  Her family history includes Alcohol abuse in her maternal aunt; Anxiety disorder in her sister; Arthritis in her father and mother; Bipolar disorder in her sister; Birth defects in her daughter; Cancer in her father, maternal grandmother, mother, and paternal grandfather; Dementia in her father; Depression in her mother and sister; Diabetes in her daughter; Heart disease in her mother; Hyperlipidemia in her brother, father, mother, and sister; Hypertension in her father, mother, and sister; Kidney disease in her mother; Learning disabilities in her brother, daughter, and father; Liver cancer in her mother; Liver disease in her mother; Mental illness in her brother, mother, and sister; Miscarriages / Stillbirths in her daughter and sister;  "Schizophrenia in her sister; Stroke in her maternal grandfather and maternal grandmother; Suicide Attempts in her mother; Thyroid disease in her daughter, mother, and sister; Vision loss in her maternal grandmother.       Current Outpatient Medications on File Prior to Visit   Medication Sig    Aspirin Low Dose 81 MG EC tablet Take 1 tablet by mouth Daily.    atorvastatin (LIPITOR) 40 MG tablet Take 1 tablet by mouth Every Night.    buPROPion XL (WELLBUTRIN XL) 300 MG 24 hr tablet Take 1 tablet by mouth Daily.    coenzyme Q10 100 MG capsule Take 1 capsule by mouth Every Night.    famotidine (PEPCID) 40 MG tablet Take 1 tablet by mouth Daily.    ferrous sulfate (FeroSul) 325 (65 FE) MG tablet Every other night (Patient taking differently: Twice a week)    ibuprofen (ADVIL,MOTRIN) 600 MG tablet Take 1 tablet by mouth As Needed.    levothyroxine (SYNTHROID, LEVOTHROID) 112 MCG tablet Take 1 tablet by mouth Every Morning.    metoprolol succinate XL (TOPROL-XL) 25 MG 24 hr tablet Take 1 tablet by mouth Daily.    potassium chloride ER (K-TAB) 20 MEQ tablet controlled-release ER tablet TAKE 1 TABLET BY MOUTH TWICE A DAY    sertraline (ZOLOFT) 50 MG tablet Take 3 tablets by mouth Daily.    torsemide (DEMADEX) 20 MG tablet Take 1 tablet by mouth Daily.    traZODone (DESYREL) 100 MG tablet Take 1 tablet by mouth every night at bedtime.     No current facility-administered medications on file prior to visit.         Review of Systems   Review of systems negative    Objective   Vitals:    06/02/25 1501   BP: 121/73   Pulse: 60   Weight: 85.3 kg (188 lb)   Height: 170.2 cm (67\")         Physical Exam  General : Alert, awake, no acute distress  Neck : Supple, no carotid bruit, no jugular venous distention  CVS : Regular rate and rhythm, no murmur, no rubs or gallops  Lungs: Clear to auscultation bilaterally, no crackles or rhonchi  Abdomen: Soft, nontender, bowel sounds active  Extremities: Warm, well-perfused, no pedal " "edema      Result Review     The following data was reviewed by Yoanna Alston, APRN  No results found for: \"PROBNP\"  CMP          9/27/2024    10:49 2/14/2025    13:44 5/29/2025    16:11   CMP   Glucose 99   92    BUN 13   15.0    Creatinine 0.78  1.10  0.96    EGFR 87.1  57.3  67.5    Sodium 140   138    Potassium 3.7   3.8    Chloride 102   97    Calcium 9.4   9.7    Total Protein 6.7   6.9    Albumin 4.3   4.3    Globulin 2.4   2.6    Total Bilirubin 0.3   0.2    Alkaline Phosphatase 129   136    AST (SGOT) 30   35    ALT (SGPT) 20   22    Albumin/Globulin Ratio 1.8   1.7    BUN/Creatinine Ratio 16.7   15.6    Anion Gap 8.1   9.1      CBC w/diff          9/27/2024    10:49   CBC w/Diff   WBC 6.74    RBC 4.59    Hemoglobin 14.6    Hematocrit 44.1    MCV 96.1    MCH 31.8    MCHC 33.1    RDW 12.4    Platelets 222    Neutrophil Rel % 66.2    Immature Granulocyte Rel % 0.1    Lymphocyte Rel % 24.3    Monocyte Rel % 7.7    Eosinophil Rel % 0.7    Basophil Rel % 1.0       Lab Results   Component Value Date    TSH 2.390 05/29/2025      Lab Results   Component Value Date    FREET4 1.6 09/10/2019      No results found for: \"DDIMERQUANT\"  Magnesium   Date Value Ref Range Status   09/13/2023 1.9 1.9 - 2.7 mg/dL Final      No results found for: \"DIGOXIN\"   Lab Results   Component Value Date    TROPONINT 18 (H) 08/31/2023           Lipid Panel          5/29/2025    16:11   Lipid Panel   Total Cholesterol 129    Triglycerides 73    HDL Cholesterol 57    VLDL Cholesterol 15    LDL Cholesterol  57    LDL/HDL Ratio 1.01          Results for orders placed during the hospital encounter of 06/20/23    Adult Transthoracic Echo Complete W/ Cont if Necessary Per Protocol    Interpretation Summary    Left ventricular systolic function is normal. Left ventricular ejection fraction appears to be 61 - 65%.    Left ventricular diastolic function is consistent with (grade I) impaired relaxation.    There are no significant valvular " abnormalities.    Aneurysmal dilation of the ascending aorta is present, measuring 4.7 cm diameter.    Estimated right ventricular systolic pressure from tricuspid regurgitation is normal (<35 mmHg).             Assessment and Plan   Diagnoses and all orders for this visit:    1. Paroxysmal atrial fibrillation (Primary)  Assessment & Plan:  She is in normal rhythm without any complaints of palpitations.  Continue Toprol for rate and rhythm control.  Previously discontinued anticoagulation due to cost issues, and no A-fib burden on her pacemaker interrogations.  We will continue aspirin.      2. Presence of cardiac pacemaker  Assessment & Plan:  She is due for pacemaker device interrogation.  We will work on getting this scheduled.  Continue remote home monitoring.      3. Chronic diastolic (congestive) heart failure  Assessment & Plan:  She does not appear to have any volume overload, and she has stable class I symptoms.  Continue current regiment with torsemide and metoprolol.          4. Aneurysm of ascending aorta without rupture  Assessment & Plan:  She previously had aneurysm repair of the ascending aorta, her sister was diagnosed with an intracerebral aneurysm, hence her family doctor screened her her earlier this year with head imaging which was negative for aneurysm.  Recommend to continue to keep tight control blood pressure, continue aspirin and statin.      5. Mixed hyperlipidemia  Assessment & Plan:   LDL is at goal at 57.  Continue current dose atorvastatin 40 mg nightly.                  Follow Up   Return in about 6 months (around 12/2/2025) for with Dr. Light, in [place].    Patient was given instructions and counseling regarding her condition or for health maintenance advice. Please see specific information pulled into the AVS if appropriate.     Signed,  TISHA Villeda  06/02/2025     Dictated Utilizing Dragon Dictation: Please note that portions of this note were completed with a voice  recognition program.  Part of this note may be an electronic transcription/translation of spoken language to printed text using the Dragon Dictation System.

## 2025-06-15 NOTE — ASSESSMENT & PLAN NOTE
She is due for pacemaker device interrogation.  We will work on getting this scheduled.  Continue remote home monitoring.

## 2025-06-15 NOTE — ASSESSMENT & PLAN NOTE
She is in normal rhythm without any complaints of palpitations.  Continue Toprol for rate and rhythm control.  Previously discontinued anticoagulation due to cost issues, and no A-fib burden on her pacemaker interrogations.  We will continue aspirin.

## 2025-06-15 NOTE — ASSESSMENT & PLAN NOTE
She does not appear to have any volume overload, and she has stable class I symptoms.  Continue current regiment with torsemide and metoprolol.

## 2025-06-15 NOTE — ASSESSMENT & PLAN NOTE
NAME: Mylene Wolff  : 1958  PCP: BUSTER Gunn    Chief complaint: low back pain     HPI:  66 y.o. female presenting for initial visit with chief complaint of pain at her waist/low back. The patient speaks Jamaican and an  was utilized for the visit today. She is accompanied by her friend, a young female, for her visit today. She reports to the office with low back pain which began about 2 months ago. She has never had these symptoms before. She reports she feels tired which has increased the symptoms in her back.  She denies any weakness in her legs. She reports she can walk a few blocks but needs to take breaks. She stands 10 hours per day at work but requires frequent breaks. She works at a [email or web address] in [place]. She gets 2 breaks at work throughout the day. Denies any charles trauma. Denies fever or chills. Denies fine motor changes such as buttoning of shirt or change in gait.     She was recently in the emergency room on 10/11/2022 for neck pain.  However at today's evaluation she denies any neck pain, denies any cervical radicular symptoms.  Denies any symptoms of cervical myelopathy.    Conservative therapy includes the following:  no injections.    no physical therapy     The patient has noticed significant impairment in performing the following ADLs: walking, standing for long periods of time. She is able to work as tolerated due to the pain.    Update on 2023:   Mylene is here for follow up after physical therapy. The patient speaks Jamaican and an  was utilized for the visit today. She notes she feels a little better after physical therapy. She has been able to walk farther since starting physical therapy, noting she didn't feel fatigued at all on her walk into the office today. However, physical therapy has not helped directly with decreasing her pain. She has continued low back pain with intermittent radiation into her right anterior thigh. Increased LE  She previously had aneurysm repair of the ascending aorta, her sister was diagnosed with an intracerebral aneurysm, hence her family doctor screened her her earlier this year with head imaging which was negative for aneurysm.  Recommend to continue to keep tight control blood pressure, continue aspirin and statin.   cramps. Pain increased when going from seated to standing position. Pain also increased with back extension. Denies charles LE weakness. Denies numbness/tingling. Pain worse with back extension.    She is currently taking naproxen with some improvement in pain.    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 She is able to continue working, but notes some increased pain.    Notes she uses a cart at work to lean on due to increased balance issues, noting it is exacerbated by her increased back pain. Also notes she has difficulty gripping items with her left hand, otherwise denies UE weakness. Denies pain in her UE, but notes some increased cramping in her arms. Some mild intermittent neck pain. Back pain > neck pain at this time. Back pain > leg pain.    DEXA scan 1/13/2023 that demonstrated osteopenia.    Update on 3/24/23  The patient follows up for the results of her lumbar MRI.  She reports that she has gone to physical therapy and is doing home exercises with some benefit, but is not satisfied with her pain relief. She reports continued pain in the low back and radiation down her right lower extremity. She would like to explore additional options.     The visit was conducted with assistance of a telephone .    6/3/2023 update  Patient is here for follow-up.  She had an epidural steroid injection and notes almost complete relief of her pain.  She has no other complaints at this time.  Overall she is doing well.    12/1/2023 Update  Presents today for follow-up evaluation of her low back pain. Guamanian  was utilized for the visit today. She states that her pain is localized into the lower lumbar spine most notably with standing. She states that when her walks he does hunch forward to alleviate her pain. She denies charles trauma.  She also denies any distal paresthesias.  She has been utilizing naproxen with some symptomatic relief.   She has undergone a epidural injection at the L4-L5 level from Dr. Hanks in May which provided with great symptomatic relief.  She states that previous physical therapy in the earlier part of the year was also beneficial.  She is currently not interested in surgical intervention at this time    2/2/24 Update  Patient presents for follow up evaluation. She has received L3-5 bilateral MBB with only temporarily relief.  Her pain complaint is she has run out of pain medication and would like to apply for disability. She complains of a several day history of neck pain that does not radiate. Her low back pain got relief with injection but she would like to discuss surgery. She will see spine and pain management for her disability form and return with her daughter for a more comprehensive discussion of surgery for her low back.    5/3/24 Update  Patient is here for follow up and would like to discuss surgery.  She has worsening back pain.  She is accompanied by her son-in-law.  States she had a recent fall.        FAMILY HISTORY   Family History   Problem Relation Age of Onset    Breast cancer Mother     Thrombosis Father     No Known Problems Sister     No Known Problems Sister     No Known Problems Sister     Hypertension Sister     Hypertension Brother     Hypertension Brother     Diabetes Maternal Grandmother        PAST MEDICAL HISTORY:   Past Medical History:   Diagnosis Date    Allergic     Hammertoe of right foot     OR   reapir today 7/21/2023    Hypertension     Wears glasses        MEDICATIONS:  Current Outpatient Medications   Medication Sig Dispense Refill    amLODIPine (NORVASC) 5 mg tablet Take 1 tablet (5 mg total) by mouth 2 (two) times a day 180 tablet 1    atenolol (TENORMIN) 50 mg tablet Take 1 tablet (50 mg total) by mouth daily 90 tablet 1    benzonatate (TESSALON PERLES) 100 mg capsule Take 1 capsule (100 mg total) by mouth 3 (three) times a day as needed for cough (Patient not taking: Reported on  3/25/2024) 20 capsule 0    Blood Pressure Monitoring (Adult Blood Pressure Cuff Lg) KIT Use in the morning (Patient not taking: Reported on 12/8/2023) 1 kit 0    Blood Pressure Monitoring (Blood Pressure Monitor/M Cuff) MISC Use in the morning (Patient not taking: Reported on 12/8/2023) 1 each 0    Calcium-Magnesium-Vitamin D (CALCIUM MAGNESIUM PO) Take 2 capsules by mouth 3 (three) times a day      cholecalciferol (VITAMIN D3) 1,000 units tablet Take 2 tablets (2,000 Units total) by mouth daily 60 tablet 5    Diclofenac Sodium (VOLTAREN) 1 % Apply 2 g topically 4 (four) times a day 100 g 0    lidocaine (LIDODERM) 5 % Apply 1 patch topically over 12 hours daily Remove & Discard patch within 12 hours or as directed by MD (Patient not taking: Reported on 3/7/2024) 5 patch 0    lidocaine (LIDODERM) 5 % Apply 1 patch topically over 12 hours daily for 4 days Remove & Discard patch within 12 hours or as directed by MD 4 patch 0    lidocaine (Lidoderm) 5 % Apply 1 patch topically over 12 hours daily Remove & Discard patch within 12 hours or as directed by MD (Patient not taking: Reported on 3/25/2024) 30 patch 0    lidocaine (Lidoderm) 5 % Apply 1 patch topically over 12 hours daily for 10 days Remove & Discard patch within 12 hours or as directed by MD 10 patch 0    meclizine (ANTIVERT) 25 mg tablet Take 1 tablet (25 mg total) by mouth 3 (three) times a day as needed for dizziness for up to 3 days 9 tablet 0    methocarbamol (ROBAXIN) 500 mg tablet Take 1 tablet (500 mg total) by mouth 4 (four) times a day 30 tablet 1    Mirabegron ER (Myrbetriq) 50 MG TB24 every 24 hours (Patient not taking: Reported on 3/7/2024)      Multiple Vitamins-Minerals (multivitamin with minerals) tablet Take 1 tablet by mouth daily      naproxen (NAPROSYN) 500 mg tablet Take 1 tablet (500 mg total) by mouth 2 (two) times a day with meals (Patient not taking: Reported on 3/25/2024) 30 tablet 0    rosuvastatin (CRESTOR) 10 MG tablet Take 1 tablet  (10 mg total) by mouth daily 90 tablet 1    sodium picosulfate, magnesium oxide, citric acid (Clenpiq) oral solution Use as directed as per written instructions from office (Patient not taking: Reported on 3/7/2024) 320 mL 0    Vibegron (Gemtesa) 75 MG TABS every 24 hours (Patient not taking: Reported on 3/7/2024)       No current facility-administered medications for this visit.       PAST SURGICAL HISTORY:  Past Surgical History:   Procedure Laterality Date     SECTION      HERNIA REPAIR  1969    HYSTERECTOMY      removed some of left ovary    MD CORRECTION HAMMERTOE Right 2023    Procedure: REPAIR HAMMERTOE 3RD TOE;  Surgeon: Lokesh Simmons DPM;  Location: [facility];  Service: Podiatry    MD LAPS ABD PRTM&OMENTUM DX W/WO SPEC BR/WA SPX N/A 2023    Procedure: LAPAROSCOPY DIAGNOSTIC, LYSIS OF ADHESIONS;  Surgeon: Debbie Clark MD;  Location: [facility];  Service: General       SOCIAL HISTORY:  Social History     Socioeconomic History    Marital status: /Civil Union     Spouse name: Not on file    Number of children: 4    Years of education: Not on file    Highest education level: Not on file   Occupational History    Not on file   Tobacco Use    Smoking status: Never    Smokeless tobacco: Never   Vaping Use    Vaping status: Never Used   Substance and Sexual Activity    Alcohol use: Never    Drug use: Never    Sexual activity: Not Currently     Partners: Male   Other Topics Concern    Not on file   Social History Narrative    Not on file     Social Determinants of Health     Financial Resource Strain: Not on file   Food Insecurity: Not on file   Transportation Needs: Not on file   Physical Activity: Not on file   Stress: Not on file   Social Connections: Not on file   Intimate Partner Violence: Not on file   Housing Stability: Not on file       ALLERGIES:  Allergies   Allergen Reactions    Aspirin Swelling       ROS:   Constitutional:  No fever, chills, night sweats,  "loss of appetite   HEENT No no sore throat, difficulty swallowing   Cardiovascular:  No chest pain, palpitations, BLE edema, MELISSA   Respiratory:  No SOB, coughing, chest congestion   Gastrointestinal:  No nausea, vomiting, abdominal pain   Genitourinary:  No dysuria, hematuria, urinary urgency/frequency   Musculoskeletal:  See HPI   Skin:  No rash, erythema, edema   Neurologic:  See HPI   Psychiatric Illness:  No depression, anxiety, mood disorder, substance abuse disorder     PHYSICAL EXAM:  /78   Pulse 74   Ht 5' 3\" (1.6 m)   Wt 81.6 kg (179 lb 14.3 oz)   BMI 31.87 kg/m²          General:  Well-developed,appears well, no acute distress   Respiratory:  No SOB, no abnormal effort (use of accessory muscles).    GI / Abdominal:  Soft. No abdominal masses or tenderness. Nondistended.    Gait & balance No evidence of myelopathic gait.      Lumbar spine range of motion:  -Forward flexion to 90  -Extension to neutral  -Lateral bend 45 right, 45 left  -Rotation 45 right, 45 left  There is no point tenderness with palpation along the posterior cervical, thoracic, lumbar spine.     Neurologic:      Lower Extremity Motor Function    Right  Left    Iliopsoas  5/5  5/5    Quadriceps 5/5 5/5   Tibialis anterior  5/5  5/5    EHL  5/5  5/5    Gastroc. muscle  5/5  5/5    Heel rise  5/5  5/5    Toe rise  5/5  5/5      Sensory: light touch is intact to bilateral upper and lower extremities     Reflexes:    Right Left   Patellar 1+ 1+   Achilles 1+ 1+   Babinski neg neg     Upper Extremity Motor Function   Right  Left    Deltoid  5/5  5/5    Bicep  5/5  5/5    Wrist extension  5/5  5/5    Tricep  5/5  5/5    Finger flexion/  5/5  5/5    Hand intrinsic  5/5  5/5            Other tests:  Straight Leg Raise: negative  Armas's: negative  Clonus: negative    Right hamstring tightness  Symmetric ROM of hips and knees   Pain in hips with SI compression test     IMAGING: I have personally reviewed the images and these are my " findings: No new images for review at today's visit.    X-rays taken of Lumbar spine on 11/18/22:  L4-5 degenerative spondylolisthesis with instability noted on dynamic flexion-extension radiographs, L4-5 endplate sclerosis appreciated, L4-5 L5-S1 facet hypertrophy, L5-S1 disc space degeneration with loss of disc space height, there is also coronal plane deformity noted with apex at L 4 5 disc space        MRI Lumbar Spine 3/24/23: Multilevel lumbar degenerative changes with L5-S1 disc base degeneration, L4-5 degenerative spondylolisthesis, right-sided L4-5 lateral recess stenosis and foraminal stenosis, no significant central stenosis however there does appear to be partial reduction of her degenerative spondylolisthesis on supine imaging        Xrays of the cervical spine 11/18/22  Multilevel degenerative changes with loss of disc height, bilateral uncovertebral degenerative changes at C5-6    ASSESSMENT / Medical Decision Making (MDM):  66 y.o. female with low back pain. Here for follow up after completion of physical therapy. No incontinence of bowel or bladder, fever, chills, night sweats.     Physical exam showing no focal neurologic deficits     Imaging reviewed as above.  Findings most notable for L4-5 degenerative spondylolisthesis with dynamic instability and right foraminal stenosis L4-5.    Impression of lumbar degenerative disease, lumbar degenerative spondylolisthesis L4-5, multi-level cervical spondylosis without radicular symptoms and left trapezius spasm.     Plan: The above clinical, physical and imaging findings were reviewed with the patient. Mylene has a constellation of findings consistent with lumbar radiculopathy, lumbar myofascial pain, mechanical low back pain, neurogenic claudication in the setting lumbar degenerative disease and lumbar degenerative spondylolisthesis L4-5. She would like to discuss surgery since her pain continues to relapse between conservative treatments.  We will obtain  updated MRI for surgical planning and Mylene will return with family for surgical disucsssion.

## 2025-06-30 DIAGNOSIS — F33.1 MODERATE EPISODE OF RECURRENT MAJOR DEPRESSIVE DISORDER: ICD-10-CM

## 2025-06-30 RX ORDER — BUPROPION HYDROCHLORIDE 300 MG/1
300 TABLET ORAL DAILY
Qty: 90 TABLET | Refills: 0 | Status: SHIPPED | OUTPATIENT
Start: 2025-06-30

## 2025-07-08 LAB
MC_CV_MDC_IDC_RATE_1: 150
MC_CV_MDC_IDC_RATE_1: 171
MC_CV_MDC_IDC_THERAPIES: NORMAL
MC_CV_MDC_IDC_ZONE_ID: 2
MC_CV_MDC_IDC_ZONE_ID: 6
MDC_IDC_MSMT_BATTERY_REMAINING_LONGEVITY: 158 MO
MDC_IDC_MSMT_BATTERY_RRT_TRIGGER: 2.62
MDC_IDC_MSMT_BATTERY_STATUS: NORMAL
MDC_IDC_MSMT_BATTERY_VOLTAGE: 3.04
MDC_IDC_MSMT_LEADCHNL_RA_DTM: NORMAL
MDC_IDC_MSMT_LEADCHNL_RA_IMPEDANCE_VALUE: 380
MDC_IDC_MSMT_LEADCHNL_RA_PACING_THRESHOLD_AMPLITUDE: 0.62
MDC_IDC_MSMT_LEADCHNL_RA_PACING_THRESHOLD_POLARITY: NORMAL
MDC_IDC_MSMT_LEADCHNL_RA_PACING_THRESHOLD_PULSEWIDTH: 0.4
MDC_IDC_MSMT_LEADCHNL_RA_SENSING_INTR_AMPL: 0.62
MDC_IDC_MSMT_LEADCHNL_RV_DTM: NORMAL
MDC_IDC_MSMT_LEADCHNL_RV_IMPEDANCE_VALUE: 532
MDC_IDC_MSMT_LEADCHNL_RV_PACING_THRESHOLD_AMPLITUDE: 1.5
MDC_IDC_MSMT_LEADCHNL_RV_PACING_THRESHOLD_POLARITY: NORMAL
MDC_IDC_MSMT_LEADCHNL_RV_PACING_THRESHOLD_PULSEWIDTH: 0.4
MDC_IDC_MSMT_LEADCHNL_RV_SENSING_INTR_AMPL: 6.62
MDC_IDC_PG_IMPLANT_DTM: NORMAL
MDC_IDC_PG_MFG: NORMAL
MDC_IDC_PG_MODEL: NORMAL
MDC_IDC_PG_SERIAL: NORMAL
MDC_IDC_PG_TYPE: NORMAL
MDC_IDC_SESS_DTM: NORMAL
MDC_IDC_SESS_TYPE: NORMAL
MDC_IDC_SET_BRADY_AT_MODE_SWITCH_RATE: 171
MDC_IDC_SET_BRADY_LOWRATE: 60
MDC_IDC_SET_BRADY_MAX_SENSOR_RATE: 130
MDC_IDC_SET_BRADY_MAX_TRACKING_RATE: 130
MDC_IDC_SET_BRADY_MODE: NORMAL
MDC_IDC_SET_BRADY_PAV_DELAY: 180
MDC_IDC_SET_BRADY_SAV_DELAY: 150
MDC_IDC_SET_LEADCHNL_RA_PACING_AMPLITUDE: 1
MDC_IDC_SET_LEADCHNL_RA_PACING_POLARITY: NORMAL
MDC_IDC_SET_LEADCHNL_RA_PACING_PULSEWIDTH: 0.4
MDC_IDC_SET_LEADCHNL_RA_SENSING_POLARITY: NORMAL
MDC_IDC_SET_LEADCHNL_RA_SENSING_SENSITIVITY: 0.3
MDC_IDC_SET_LEADCHNL_RV_PACING_AMPLITUDE: 2.25
MDC_IDC_SET_LEADCHNL_RV_PACING_POLARITY: NORMAL
MDC_IDC_SET_LEADCHNL_RV_PACING_PULSEWIDTH: 0.4
MDC_IDC_SET_LEADCHNL_RV_SENSING_POLARITY: NORMAL
MDC_IDC_SET_LEADCHNL_RV_SENSING_SENSITIVITY: 0.9
MDC_IDC_SET_ZONE_STATUS: NORMAL
MDC_IDC_SET_ZONE_STATUS: NORMAL
MDC_IDC_SET_ZONE_TYPE: NORMAL
MDC_IDC_SET_ZONE_TYPE: NORMAL
MDC_IDC_STAT_AT_BURDEN_PERCENT: 0
MDC_IDC_STAT_BRADY_RA_PERCENT_PACED: 78.3
MDC_IDC_STAT_BRADY_RV_PERCENT_PACED: 0.05

## 2025-08-12 ENCOUNTER — TRANSCRIBE ORDERS (OUTPATIENT)
Dept: ADMINISTRATIVE | Facility: HOSPITAL | Age: 60
End: 2025-08-12
Payer: COMMERCIAL

## 2025-08-12 DIAGNOSIS — I71.21 ANEURYSM OF ASCENDING AORTA WITHOUT RUPTURE: Primary | ICD-10-CM

## (undated) DEVICE — PENCL E/S SMOKEEVAC W/TELESCP CANN

## (undated) DEVICE — TP SXN YANKR BULB STRL

## (undated) DEVICE — UNDYED BRAIDED (POLYGLACTIN 910), SYNTHETIC ABSORBABLE SUTURE: Brand: COATED VICRYL

## (undated) DEVICE — DRSNG SURG AQUACEL AG/ADVNTGE 9X15CM 3.5X6IN

## (undated) DEVICE — TUBING, SUCTION, 1/4" X 12', STRAIGHT: Brand: MEDLINE

## (undated) DEVICE — 8 FOOT DISPOSABLE EXTENSION CABLE WITH SAFE CONNECT / ALLIGATOR CLIP

## (undated) DEVICE — ANTIBACTERIAL UNDYED BRAIDED (POLYGLACTIN 910), SYNTHETIC ABSORBABLE SUTURE: Brand: COATED VICRYL

## (undated) DEVICE — 3M™ STERI-STRIP™ REINFORCED ADHESIVE SKIN CLOSURES, R1546, 1/4 IN X 4 IN (6 MM X 100 MM), 10 STRIPS/ENVELOPE: Brand: 3M™ STERI-STRIP™